# Patient Record
Sex: FEMALE | Race: WHITE | NOT HISPANIC OR LATINO | URBAN - METROPOLITAN AREA
[De-identification: names, ages, dates, MRNs, and addresses within clinical notes are randomized per-mention and may not be internally consistent; named-entity substitution may affect disease eponyms.]

---

## 2017-05-25 ENCOUNTER — INPATIENT (INPATIENT)
Facility: HOSPITAL | Age: 67
LOS: 7 days | Discharge: HOME HEALTH | End: 2017-06-02
Attending: INTERNAL MEDICINE

## 2017-05-25 DIAGNOSIS — G30.9 ALZHEIMER'S DISEASE, UNSPECIFIED: ICD-10-CM

## 2017-05-25 DIAGNOSIS — J18.9 PNEUMONIA, UNSPECIFIED ORGANISM: ICD-10-CM

## 2017-06-28 DIAGNOSIS — J69.0 PNEUMONITIS DUE TO INHALATION OF FOOD AND VOMIT: ICD-10-CM

## 2017-06-28 DIAGNOSIS — R13.10 DYSPHAGIA, UNSPECIFIED: ICD-10-CM

## 2017-06-28 DIAGNOSIS — R06.02 SHORTNESS OF BREATH: ICD-10-CM

## 2017-06-28 DIAGNOSIS — Z66 DO NOT RESUSCITATE: ICD-10-CM

## 2017-06-28 DIAGNOSIS — E87.2 ACIDOSIS: ICD-10-CM

## 2017-06-28 DIAGNOSIS — Z74.01 BED CONFINEMENT STATUS: ICD-10-CM

## 2017-06-28 DIAGNOSIS — D64.9 ANEMIA, UNSPECIFIED: ICD-10-CM

## 2017-06-28 DIAGNOSIS — R53.2 FUNCTIONAL QUADRIPLEGIA: ICD-10-CM

## 2017-06-28 DIAGNOSIS — A41.9 SEPSIS, UNSPECIFIED ORGANISM: ICD-10-CM

## 2017-06-28 DIAGNOSIS — G30.9 ALZHEIMER'S DISEASE, UNSPECIFIED: ICD-10-CM

## 2017-06-28 DIAGNOSIS — J90 PLEURAL EFFUSION, NOT ELSEWHERE CLASSIFIED: ICD-10-CM

## 2017-06-28 DIAGNOSIS — J96.91 RESPIRATORY FAILURE, UNSPECIFIED WITH HYPOXIA: ICD-10-CM

## 2017-06-28 DIAGNOSIS — F02.80 DEMENTIA IN OTHER DISEASES CLASSIFIED ELSEWHERE, UNSPECIFIED SEVERITY, WITHOUT BEHAVIORAL DISTURBANCE, PSYCHOTIC DISTURBANCE, MOOD DISTURBANCE, AND ANXIETY: ICD-10-CM

## 2017-06-28 DIAGNOSIS — R00.0 TACHYCARDIA, UNSPECIFIED: ICD-10-CM

## 2017-06-28 PROBLEM — Z00.00 ENCOUNTER FOR PREVENTIVE HEALTH EXAMINATION: Status: ACTIVE | Noted: 2017-06-28

## 2024-01-01 ENCOUNTER — INPATIENT (INPATIENT)
Facility: HOSPITAL | Age: 74
LOS: 21 days | DRG: 872 | End: 2024-09-05
Attending: STUDENT IN AN ORGANIZED HEALTH CARE EDUCATION/TRAINING PROGRAM | Admitting: HOSPITALIST
Payer: MEDICARE

## 2024-01-01 VITALS — WEIGHT: 80.03 LBS | HEIGHT: 58 IN | HEART RATE: 132 BPM | OXYGEN SATURATION: 80 % | RESPIRATION RATE: 28 BRPM

## 2024-01-01 VITALS
DIASTOLIC BLOOD PRESSURE: 100 MMHG | OXYGEN SATURATION: 95 % | TEMPERATURE: 98 F | SYSTOLIC BLOOD PRESSURE: 139 MMHG | HEART RATE: 100 BPM | RESPIRATION RATE: 16 BRPM

## 2024-01-01 DIAGNOSIS — A41.9 SEPSIS, UNSPECIFIED ORGANISM: ICD-10-CM

## 2024-01-01 DIAGNOSIS — R13.10 DYSPHAGIA, UNSPECIFIED: ICD-10-CM

## 2024-01-01 DIAGNOSIS — J96.01 ACUTE RESPIRATORY FAILURE WITH HYPOXIA: ICD-10-CM

## 2024-01-01 DIAGNOSIS — Z71.89 OTHER SPECIFIED COUNSELING: ICD-10-CM

## 2024-01-01 DIAGNOSIS — Z51.5 ENCOUNTER FOR PALLIATIVE CARE: ICD-10-CM

## 2024-01-01 LAB
-  CLINDAMYCIN: SIGNIFICANT CHANGE UP
-  ERYTHROMYCIN: SIGNIFICANT CHANGE UP
-  GENTAMICIN: SIGNIFICANT CHANGE UP
-  OXACILLIN: SIGNIFICANT CHANGE UP
-  PENICILLIN: SIGNIFICANT CHANGE UP
-  RIFAMPIN: SIGNIFICANT CHANGE UP
-  STAPHYLOCOCCUS EPIDERMIDIS, METHICILLIN RESISTANT: SIGNIFICANT CHANGE UP
-  TETRACYCLINE: SIGNIFICANT CHANGE UP
-  TRIMETHOPRIM/SULFAMETHOXAZOLE: SIGNIFICANT CHANGE UP
-  VANCOMYCIN: SIGNIFICANT CHANGE UP
ALBUMIN SERPL ELPH-MCNC: 2.1 G/DL — LOW (ref 3.5–5.2)
ALBUMIN SERPL ELPH-MCNC: 2.2 G/DL — LOW (ref 3.5–5.2)
ALBUMIN SERPL ELPH-MCNC: 2.2 G/DL — LOW (ref 3.5–5.2)
ALBUMIN SERPL ELPH-MCNC: 2.3 G/DL — LOW (ref 3.5–5.2)
ALBUMIN SERPL ELPH-MCNC: 2.4 G/DL — LOW (ref 3.5–5.2)
ALBUMIN SERPL ELPH-MCNC: 2.6 G/DL — LOW (ref 3.5–5.2)
ALBUMIN SERPL ELPH-MCNC: 2.7 G/DL — LOW (ref 3.5–5.2)
ALBUMIN SERPL ELPH-MCNC: 2.8 G/DL — LOW (ref 3.5–5.2)
ALBUMIN SERPL ELPH-MCNC: 2.8 G/DL — LOW (ref 3.5–5.2)
ALBUMIN SERPL ELPH-MCNC: 2.9 G/DL — LOW (ref 3.5–5.2)
ALBUMIN SERPL ELPH-MCNC: 3 G/DL — LOW (ref 3.5–5.2)
ALBUMIN SERPL ELPH-MCNC: 3.1 G/DL — LOW (ref 3.5–5.2)
ALBUMIN SERPL ELPH-MCNC: 3.7 G/DL — SIGNIFICANT CHANGE UP (ref 3.5–5.2)
ALP SERPL-CCNC: 100 U/L — SIGNIFICANT CHANGE UP (ref 30–115)
ALP SERPL-CCNC: 100 U/L — SIGNIFICANT CHANGE UP (ref 30–115)
ALP SERPL-CCNC: 102 U/L — SIGNIFICANT CHANGE UP (ref 30–115)
ALP SERPL-CCNC: 105 U/L — SIGNIFICANT CHANGE UP (ref 30–115)
ALP SERPL-CCNC: 106 U/L — SIGNIFICANT CHANGE UP (ref 30–115)
ALP SERPL-CCNC: 107 U/L — SIGNIFICANT CHANGE UP (ref 30–115)
ALP SERPL-CCNC: 107 U/L — SIGNIFICANT CHANGE UP (ref 30–115)
ALP SERPL-CCNC: 108 U/L — SIGNIFICANT CHANGE UP (ref 30–115)
ALP SERPL-CCNC: 109 U/L — SIGNIFICANT CHANGE UP (ref 30–115)
ALP SERPL-CCNC: 110 U/L — SIGNIFICANT CHANGE UP (ref 30–115)
ALP SERPL-CCNC: 114 U/L — SIGNIFICANT CHANGE UP (ref 30–115)
ALP SERPL-CCNC: 121 U/L — HIGH (ref 30–115)
ALP SERPL-CCNC: 142 U/L — HIGH (ref 30–115)
ALP SERPL-CCNC: 84 U/L — SIGNIFICANT CHANGE UP (ref 30–115)
ALP SERPL-CCNC: 86 U/L — SIGNIFICANT CHANGE UP (ref 30–115)
ALP SERPL-CCNC: 91 U/L — SIGNIFICANT CHANGE UP (ref 30–115)
ALP SERPL-CCNC: 92 U/L — SIGNIFICANT CHANGE UP (ref 30–115)
ALP SERPL-CCNC: 93 U/L — SIGNIFICANT CHANGE UP (ref 30–115)
ALP SERPL-CCNC: 95 U/L — SIGNIFICANT CHANGE UP (ref 30–115)
ALP SERPL-CCNC: 97 U/L — SIGNIFICANT CHANGE UP (ref 30–115)
ALP SERPL-CCNC: 99 U/L — SIGNIFICANT CHANGE UP (ref 30–115)
ALT FLD-CCNC: 21 U/L — SIGNIFICANT CHANGE UP (ref 0–41)
ALT FLD-CCNC: 23 U/L — SIGNIFICANT CHANGE UP (ref 0–41)
ALT FLD-CCNC: 26 U/L — SIGNIFICANT CHANGE UP (ref 0–41)
ALT FLD-CCNC: 26 U/L — SIGNIFICANT CHANGE UP (ref 0–41)
ALT FLD-CCNC: 27 U/L — SIGNIFICANT CHANGE UP (ref 0–41)
ALT FLD-CCNC: 29 U/L — SIGNIFICANT CHANGE UP (ref 0–41)
ALT FLD-CCNC: 30 U/L — SIGNIFICANT CHANGE UP (ref 0–41)
ALT FLD-CCNC: 31 U/L — SIGNIFICANT CHANGE UP (ref 0–41)
ALT FLD-CCNC: 31 U/L — SIGNIFICANT CHANGE UP (ref 0–41)
ALT FLD-CCNC: 32 U/L — SIGNIFICANT CHANGE UP (ref 0–41)
ALT FLD-CCNC: 32 U/L — SIGNIFICANT CHANGE UP (ref 0–41)
ALT FLD-CCNC: 33 U/L — SIGNIFICANT CHANGE UP (ref 0–41)
ALT FLD-CCNC: 35 U/L — SIGNIFICANT CHANGE UP (ref 0–41)
ALT FLD-CCNC: 39 U/L — SIGNIFICANT CHANGE UP (ref 0–41)
ALT FLD-CCNC: 40 U/L — SIGNIFICANT CHANGE UP (ref 0–41)
ALT FLD-CCNC: 42 U/L — HIGH (ref 0–41)
ALT FLD-CCNC: 43 U/L — HIGH (ref 0–41)
ALT FLD-CCNC: 54 U/L — HIGH (ref 0–41)
ALT FLD-CCNC: 70 U/L — HIGH (ref 0–41)
ANION GAP SERPL CALC-SCNC: 10 MMOL/L — SIGNIFICANT CHANGE UP (ref 7–14)
ANION GAP SERPL CALC-SCNC: 11 MMOL/L — SIGNIFICANT CHANGE UP (ref 7–14)
ANION GAP SERPL CALC-SCNC: 12 MMOL/L — SIGNIFICANT CHANGE UP (ref 7–14)
ANION GAP SERPL CALC-SCNC: 13 MMOL/L — SIGNIFICANT CHANGE UP (ref 7–14)
ANION GAP SERPL CALC-SCNC: 14 MMOL/L — SIGNIFICANT CHANGE UP (ref 7–14)
ANION GAP SERPL CALC-SCNC: 15 MMOL/L — HIGH (ref 7–14)
ANION GAP SERPL CALC-SCNC: 16 MMOL/L — HIGH (ref 7–14)
ANION GAP SERPL CALC-SCNC: 16 MMOL/L — HIGH (ref 7–14)
ANION GAP SERPL CALC-SCNC: 17 MMOL/L — HIGH (ref 7–14)
ANION GAP SERPL CALC-SCNC: 18 MMOL/L — HIGH (ref 7–14)
ANION GAP SERPL CALC-SCNC: 18 MMOL/L — HIGH (ref 7–14)
ANION GAP SERPL CALC-SCNC: 24 MMOL/L — HIGH (ref 7–14)
ANION GAP SERPL CALC-SCNC: 8 MMOL/L — SIGNIFICANT CHANGE UP (ref 7–14)
ANION GAP SERPL CALC-SCNC: 9 MMOL/L — SIGNIFICANT CHANGE UP (ref 7–14)
APPEARANCE UR: CLEAR — SIGNIFICANT CHANGE UP
APTT BLD: 35.9 SEC — SIGNIFICANT CHANGE UP (ref 27–39.2)
AST SERPL-CCNC: 19 U/L — SIGNIFICANT CHANGE UP (ref 0–41)
AST SERPL-CCNC: 20 U/L — SIGNIFICANT CHANGE UP (ref 0–41)
AST SERPL-CCNC: 20 U/L — SIGNIFICANT CHANGE UP (ref 0–41)
AST SERPL-CCNC: 21 U/L — SIGNIFICANT CHANGE UP (ref 0–41)
AST SERPL-CCNC: 21 U/L — SIGNIFICANT CHANGE UP (ref 0–41)
AST SERPL-CCNC: 22 U/L — SIGNIFICANT CHANGE UP (ref 0–41)
AST SERPL-CCNC: 23 U/L — SIGNIFICANT CHANGE UP (ref 0–41)
AST SERPL-CCNC: 24 U/L — SIGNIFICANT CHANGE UP (ref 0–41)
AST SERPL-CCNC: 25 U/L — SIGNIFICANT CHANGE UP (ref 0–41)
AST SERPL-CCNC: 26 U/L — SIGNIFICANT CHANGE UP (ref 0–41)
AST SERPL-CCNC: 27 U/L — SIGNIFICANT CHANGE UP (ref 0–41)
AST SERPL-CCNC: 28 U/L — SIGNIFICANT CHANGE UP (ref 0–41)
AST SERPL-CCNC: 29 U/L — SIGNIFICANT CHANGE UP (ref 0–41)
AST SERPL-CCNC: 31 U/L — SIGNIFICANT CHANGE UP (ref 0–41)
AST SERPL-CCNC: 32 U/L — SIGNIFICANT CHANGE UP (ref 0–41)
AST SERPL-CCNC: 37 U/L — SIGNIFICANT CHANGE UP (ref 0–41)
AST SERPL-CCNC: 38 U/L — SIGNIFICANT CHANGE UP (ref 0–41)
AST SERPL-CCNC: 51 U/L — HIGH (ref 0–41)
AST SERPL-CCNC: 59 U/L — HIGH (ref 0–41)
BACTERIA # UR AUTO: ABNORMAL /HPF
BASE EXCESS BLDA CALC-SCNC: -5.6 MMOL/L — LOW (ref -2–3)
BASE EXCESS BLDV CALC-SCNC: -3.9 MMOL/L — LOW (ref -2–3)
BASOPHILS # BLD AUTO: 0 K/UL — SIGNIFICANT CHANGE UP (ref 0–0.2)
BASOPHILS # BLD AUTO: 0 K/UL — SIGNIFICANT CHANGE UP (ref 0–0.2)
BASOPHILS # BLD AUTO: 0.01 K/UL — SIGNIFICANT CHANGE UP (ref 0–0.2)
BASOPHILS # BLD AUTO: 0.02 K/UL — SIGNIFICANT CHANGE UP (ref 0–0.2)
BASOPHILS # BLD AUTO: 0.03 K/UL — SIGNIFICANT CHANGE UP (ref 0–0.2)
BASOPHILS # BLD AUTO: 0.04 K/UL — SIGNIFICANT CHANGE UP (ref 0–0.2)
BASOPHILS # BLD AUTO: 0.05 K/UL — SIGNIFICANT CHANGE UP (ref 0–0.2)
BASOPHILS # BLD AUTO: 0.05 K/UL — SIGNIFICANT CHANGE UP (ref 0–0.2)
BASOPHILS NFR BLD AUTO: 0 % — SIGNIFICANT CHANGE UP (ref 0–1)
BASOPHILS NFR BLD AUTO: 0 % — SIGNIFICANT CHANGE UP (ref 0–1)
BASOPHILS NFR BLD AUTO: 0.1 % — SIGNIFICANT CHANGE UP (ref 0–1)
BASOPHILS NFR BLD AUTO: 0.2 % — SIGNIFICANT CHANGE UP (ref 0–1)
BASOPHILS NFR BLD AUTO: 0.3 % — SIGNIFICANT CHANGE UP (ref 0–1)
BASOPHILS NFR BLD AUTO: 0.4 % — SIGNIFICANT CHANGE UP (ref 0–1)
BILIRUB SERPL-MCNC: 0.4 MG/DL — SIGNIFICANT CHANGE UP (ref 0.2–1.2)
BILIRUB SERPL-MCNC: 0.5 MG/DL — SIGNIFICANT CHANGE UP (ref 0.2–1.2)
BILIRUB SERPL-MCNC: 0.6 MG/DL — SIGNIFICANT CHANGE UP (ref 0.2–1.2)
BILIRUB SERPL-MCNC: 0.7 MG/DL — SIGNIFICANT CHANGE UP (ref 0.2–1.2)
BILIRUB SERPL-MCNC: 0.7 MG/DL — SIGNIFICANT CHANGE UP (ref 0.2–1.2)
BILIRUB SERPL-MCNC: 1 MG/DL — SIGNIFICANT CHANGE UP (ref 0.2–1.2)
BILIRUB SERPL-MCNC: 1 MG/DL — SIGNIFICANT CHANGE UP (ref 0.2–1.2)
BILIRUB UR-MCNC: ABNORMAL
BLD GP AB SCN SERPL QL: SIGNIFICANT CHANGE UP
BUN SERPL-MCNC: 112 MG/DL — CRITICAL HIGH (ref 10–20)
BUN SERPL-MCNC: 116 MG/DL — CRITICAL HIGH (ref 10–20)
BUN SERPL-MCNC: 150 MG/DL — CRITICAL HIGH (ref 10–20)
BUN SERPL-MCNC: 17 MG/DL — SIGNIFICANT CHANGE UP (ref 10–20)
BUN SERPL-MCNC: 17 MG/DL — SIGNIFICANT CHANGE UP (ref 10–20)
BUN SERPL-MCNC: 18 MG/DL — SIGNIFICANT CHANGE UP (ref 10–20)
BUN SERPL-MCNC: 18 MG/DL — SIGNIFICANT CHANGE UP (ref 10–20)
BUN SERPL-MCNC: 19 MG/DL — SIGNIFICANT CHANGE UP (ref 10–20)
BUN SERPL-MCNC: 20 MG/DL — SIGNIFICANT CHANGE UP (ref 10–20)
BUN SERPL-MCNC: 22 MG/DL — HIGH (ref 10–20)
BUN SERPL-MCNC: 23 MG/DL — HIGH (ref 10–20)
BUN SERPL-MCNC: 24 MG/DL — HIGH (ref 10–20)
BUN SERPL-MCNC: 25 MG/DL — HIGH (ref 10–20)
BUN SERPL-MCNC: 36 MG/DL — HIGH (ref 10–20)
BUN SERPL-MCNC: 53 MG/DL — HIGH (ref 10–20)
BUN SERPL-MCNC: 73 MG/DL — CRITICAL HIGH (ref 10–20)
BUN SERPL-MCNC: 80 MG/DL — CRITICAL HIGH (ref 10–20)
BUN SERPL-MCNC: 81 MG/DL — CRITICAL HIGH (ref 10–20)
BUN SERPL-MCNC: 86 MG/DL — CRITICAL HIGH (ref 10–20)
BUN SERPL-MCNC: 92 MG/DL — CRITICAL HIGH (ref 10–20)
BURR CELLS BLD QL SMEAR: PRESENT — SIGNIFICANT CHANGE UP
CA-I SERPL-SCNC: 0.96 MMOL/L — LOW (ref 1.15–1.33)
CALCIUM SERPL-MCNC: 7.3 MG/DL — LOW (ref 8.4–10.5)
CALCIUM SERPL-MCNC: 7.4 MG/DL — LOW (ref 8.4–10.5)
CALCIUM SERPL-MCNC: 7.4 MG/DL — LOW (ref 8.4–10.5)
CALCIUM SERPL-MCNC: 7.5 MG/DL — LOW (ref 8.4–10.5)
CALCIUM SERPL-MCNC: 7.5 MG/DL — LOW (ref 8.4–10.5)
CALCIUM SERPL-MCNC: 7.6 MG/DL — LOW (ref 8.4–10.5)
CALCIUM SERPL-MCNC: 7.7 MG/DL — LOW (ref 8.4–10.5)
CALCIUM SERPL-MCNC: 7.8 MG/DL — LOW (ref 8.4–10.5)
CALCIUM SERPL-MCNC: 8 MG/DL — LOW (ref 8.4–10.5)
CALCIUM SERPL-MCNC: 8.1 MG/DL — LOW (ref 8.4–10.5)
CALCIUM SERPL-MCNC: 8.3 MG/DL — LOW (ref 8.4–10.5)
CALCIUM SERPL-MCNC: 8.4 MG/DL — SIGNIFICANT CHANGE UP (ref 8.4–10.5)
CALCIUM SERPL-MCNC: 8.4 MG/DL — SIGNIFICANT CHANGE UP (ref 8.4–10.5)
CALCIUM SERPL-MCNC: 8.5 MG/DL — SIGNIFICANT CHANGE UP (ref 8.4–10.5)
CALCIUM SERPL-MCNC: 9.5 MG/DL — SIGNIFICANT CHANGE UP (ref 8.4–10.5)
CHLORIDE SERPL-SCNC: 102 MMOL/L — SIGNIFICANT CHANGE UP (ref 98–110)
CHLORIDE SERPL-SCNC: 103 MMOL/L — SIGNIFICANT CHANGE UP (ref 98–110)
CHLORIDE SERPL-SCNC: 107 MMOL/L — SIGNIFICANT CHANGE UP (ref 98–110)
CHLORIDE SERPL-SCNC: 109 MMOL/L — SIGNIFICANT CHANGE UP (ref 98–110)
CHLORIDE SERPL-SCNC: 110 MMOL/L — SIGNIFICANT CHANGE UP (ref 98–110)
CHLORIDE SERPL-SCNC: 111 MMOL/L — HIGH (ref 98–110)
CHLORIDE SERPL-SCNC: 112 MMOL/L — HIGH (ref 98–110)
CHLORIDE SERPL-SCNC: 112 MMOL/L — HIGH (ref 98–110)
CHLORIDE SERPL-SCNC: 114 MMOL/L — HIGH (ref 98–110)
CHLORIDE SERPL-SCNC: 118 MMOL/L — HIGH (ref 98–110)
CHLORIDE SERPL-SCNC: 119 MMOL/L — HIGH (ref 98–110)
CHLORIDE SERPL-SCNC: 120 MMOL/L — HIGH (ref 98–110)
CHLORIDE SERPL-SCNC: 121 MMOL/L — HIGH (ref 98–110)
CHLORIDE SERPL-SCNC: 122 MMOL/L — HIGH (ref 98–110)
CHLORIDE SERPL-SCNC: 123 MMOL/L — HIGH (ref 98–110)
CHLORIDE SERPL-SCNC: 94 MMOL/L — LOW (ref 98–110)
CHLORIDE SERPL-SCNC: 97 MMOL/L — LOW (ref 98–110)
CHLORIDE SERPL-SCNC: 98 MMOL/L — SIGNIFICANT CHANGE UP (ref 98–110)
CHLORIDE SERPL-SCNC: 98 MMOL/L — SIGNIFICANT CHANGE UP (ref 98–110)
CHLORIDE SERPL-SCNC: 99 MMOL/L — SIGNIFICANT CHANGE UP (ref 98–110)
CK SERPL-CCNC: 198 U/L — SIGNIFICANT CHANGE UP (ref 0–225)
CO2 SERPL-SCNC: 13 MMOL/L — LOW (ref 17–32)
CO2 SERPL-SCNC: 14 MMOL/L — LOW (ref 17–32)
CO2 SERPL-SCNC: 17 MMOL/L — SIGNIFICANT CHANGE UP (ref 17–32)
CO2 SERPL-SCNC: 18 MMOL/L — SIGNIFICANT CHANGE UP (ref 17–32)
CO2 SERPL-SCNC: 19 MMOL/L — SIGNIFICANT CHANGE UP (ref 17–32)
CO2 SERPL-SCNC: 20 MMOL/L — SIGNIFICANT CHANGE UP (ref 17–32)
CO2 SERPL-SCNC: 22 MMOL/L — SIGNIFICANT CHANGE UP (ref 17–32)
CO2 SERPL-SCNC: 23 MMOL/L — SIGNIFICANT CHANGE UP (ref 17–32)
CO2 SERPL-SCNC: 23 MMOL/L — SIGNIFICANT CHANGE UP (ref 17–32)
CO2 SERPL-SCNC: 24 MMOL/L — SIGNIFICANT CHANGE UP (ref 17–32)
CO2 SERPL-SCNC: 24 MMOL/L — SIGNIFICANT CHANGE UP (ref 17–32)
CO2 SERPL-SCNC: 25 MMOL/L — SIGNIFICANT CHANGE UP (ref 17–32)
CO2 SERPL-SCNC: 25 MMOL/L — SIGNIFICANT CHANGE UP (ref 17–32)
CO2 SERPL-SCNC: 26 MMOL/L — SIGNIFICANT CHANGE UP (ref 17–32)
CO2 SERPL-SCNC: 27 MMOL/L — SIGNIFICANT CHANGE UP (ref 17–32)
CO2 SERPL-SCNC: 29 MMOL/L — SIGNIFICANT CHANGE UP (ref 17–32)
COLOR SPEC: SIGNIFICANT CHANGE UP
CREAT SERPL-MCNC: 0.5 MG/DL — LOW (ref 0.7–1.5)
CREAT SERPL-MCNC: 0.6 MG/DL — LOW (ref 0.7–1.5)
CREAT SERPL-MCNC: 0.8 MG/DL — SIGNIFICANT CHANGE UP (ref 0.7–1.5)
CREAT SERPL-MCNC: 1 MG/DL — SIGNIFICANT CHANGE UP (ref 0.7–1.5)
CREAT SERPL-MCNC: 1.3 MG/DL — SIGNIFICANT CHANGE UP (ref 0.7–1.5)
CREAT SERPL-MCNC: 1.5 MG/DL — SIGNIFICANT CHANGE UP (ref 0.7–1.5)
CREAT SERPL-MCNC: 1.6 MG/DL — HIGH (ref 0.7–1.5)
CREAT SERPL-MCNC: 1.6 MG/DL — HIGH (ref 0.7–1.5)
CREAT SERPL-MCNC: 1.7 MG/DL — HIGH (ref 0.7–1.5)
CREAT SERPL-MCNC: 2.2 MG/DL — HIGH (ref 0.7–1.5)
CREAT SERPL-MCNC: 2.5 MG/DL — HIGH (ref 0.7–1.5)
CREAT SERPL-MCNC: 3.4 MG/DL — HIGH (ref 0.7–1.5)
CREAT SERPL-MCNC: <0.5 MG/DL — LOW (ref 0.7–1.5)
CULTURE RESULTS: ABNORMAL
CULTURE RESULTS: ABNORMAL
DACRYOCYTES BLD QL SMEAR: SLIGHT — SIGNIFICANT CHANGE UP
DIFF PNL FLD: NEGATIVE — SIGNIFICANT CHANGE UP
EGFR: 103 ML/MIN/1.73M2 — SIGNIFICANT CHANGE UP
EGFR: 14 ML/MIN/1.73M2 — LOW
EGFR: 20 ML/MIN/1.73M2 — LOW
EGFR: 23 ML/MIN/1.73M2 — LOW
EGFR: 31 ML/MIN/1.73M2 — LOW
EGFR: 33 ML/MIN/1.73M2 — LOW
EGFR: 33 ML/MIN/1.73M2 — LOW
EGFR: 36 ML/MIN/1.73M2 — LOW
EGFR: 43 ML/MIN/1.73M2 — LOW
EGFR: 59 ML/MIN/1.73M2 — LOW
EGFR: 77 ML/MIN/1.73M2 — SIGNIFICANT CHANGE UP
EGFR: 94 ML/MIN/1.73M2 — SIGNIFICANT CHANGE UP
EGFR: 98 ML/MIN/1.73M2 — SIGNIFICANT CHANGE UP
EOSINOPHIL # BLD AUTO: 0 K/UL — SIGNIFICANT CHANGE UP (ref 0–0.7)
EOSINOPHIL # BLD AUTO: 0.02 K/UL — SIGNIFICANT CHANGE UP (ref 0–0.7)
EOSINOPHIL # BLD AUTO: 0.04 K/UL — SIGNIFICANT CHANGE UP (ref 0–0.7)
EOSINOPHIL # BLD AUTO: 0.04 K/UL — SIGNIFICANT CHANGE UP (ref 0–0.7)
EOSINOPHIL # BLD AUTO: 0.06 K/UL — SIGNIFICANT CHANGE UP (ref 0–0.7)
EOSINOPHIL # BLD AUTO: 0.07 K/UL — SIGNIFICANT CHANGE UP (ref 0–0.7)
EOSINOPHIL # BLD AUTO: 0.07 K/UL — SIGNIFICANT CHANGE UP (ref 0–0.7)
EOSINOPHIL # BLD AUTO: 0.09 K/UL — SIGNIFICANT CHANGE UP (ref 0–0.7)
EOSINOPHIL # BLD AUTO: 0.12 K/UL — SIGNIFICANT CHANGE UP (ref 0–0.7)
EOSINOPHIL NFR BLD AUTO: 0 % — SIGNIFICANT CHANGE UP (ref 0–8)
EOSINOPHIL NFR BLD AUTO: 0.1 % — SIGNIFICANT CHANGE UP (ref 0–8)
EOSINOPHIL NFR BLD AUTO: 0.5 % — SIGNIFICANT CHANGE UP (ref 0–8)
EOSINOPHIL NFR BLD AUTO: 0.6 % — SIGNIFICANT CHANGE UP (ref 0–8)
EOSINOPHIL NFR BLD AUTO: 0.6 % — SIGNIFICANT CHANGE UP (ref 0–8)
EOSINOPHIL NFR BLD AUTO: 0.8 % — SIGNIFICANT CHANGE UP (ref 0–8)
EOSINOPHIL NFR BLD AUTO: 0.8 % — SIGNIFICANT CHANGE UP (ref 0–8)
EOSINOPHIL NFR BLD AUTO: 1 % — SIGNIFICANT CHANGE UP (ref 0–8)
EOSINOPHIL NFR BLD AUTO: 1 % — SIGNIFICANT CHANGE UP (ref 0–8)
FLUAV AG NPH QL: SIGNIFICANT CHANGE UP
FLUBV AG NPH QL: SIGNIFICANT CHANGE UP
GAS PNL BLDA: SIGNIFICANT CHANGE UP
GAS PNL BLDA: SIGNIFICANT CHANGE UP
GAS PNL BLDV: 150 MMOL/L — HIGH (ref 136–145)
GAS PNL BLDV: SIGNIFICANT CHANGE UP
GLUCOSE BLDC GLUCOMTR-MCNC: 108 MG/DL — HIGH (ref 70–99)
GLUCOSE BLDC GLUCOMTR-MCNC: 182 MG/DL — HIGH (ref 70–99)
GLUCOSE SERPL-MCNC: 112 MG/DL — HIGH (ref 70–99)
GLUCOSE SERPL-MCNC: 115 MG/DL — HIGH (ref 70–99)
GLUCOSE SERPL-MCNC: 117 MG/DL — HIGH (ref 70–99)
GLUCOSE SERPL-MCNC: 118 MG/DL — HIGH (ref 70–99)
GLUCOSE SERPL-MCNC: 122 MG/DL — HIGH (ref 70–99)
GLUCOSE SERPL-MCNC: 124 MG/DL — HIGH (ref 70–99)
GLUCOSE SERPL-MCNC: 131 MG/DL — HIGH (ref 70–99)
GLUCOSE SERPL-MCNC: 132 MG/DL — HIGH (ref 70–99)
GLUCOSE SERPL-MCNC: 137 MG/DL — HIGH (ref 70–99)
GLUCOSE SERPL-MCNC: 137 MG/DL — HIGH (ref 70–99)
GLUCOSE SERPL-MCNC: 145 MG/DL — HIGH (ref 70–99)
GLUCOSE SERPL-MCNC: 156 MG/DL — HIGH (ref 70–99)
GLUCOSE SERPL-MCNC: 180 MG/DL — HIGH (ref 70–99)
GLUCOSE SERPL-MCNC: 182 MG/DL — HIGH (ref 70–99)
GLUCOSE SERPL-MCNC: 187 MG/DL — HIGH (ref 70–99)
GLUCOSE SERPL-MCNC: 195 MG/DL — HIGH (ref 70–99)
GLUCOSE SERPL-MCNC: 208 MG/DL — HIGH (ref 70–99)
GLUCOSE SERPL-MCNC: 219 MG/DL — HIGH (ref 70–99)
GLUCOSE SERPL-MCNC: 223 MG/DL — HIGH (ref 70–99)
GLUCOSE SERPL-MCNC: 93 MG/DL — SIGNIFICANT CHANGE UP (ref 70–99)
GLUCOSE SERPL-MCNC: 94 MG/DL — SIGNIFICANT CHANGE UP (ref 70–99)
GLUCOSE SERPL-MCNC: 94 MG/DL — SIGNIFICANT CHANGE UP (ref 70–99)
GLUCOSE SERPL-MCNC: 96 MG/DL — SIGNIFICANT CHANGE UP (ref 70–99)
GLUCOSE SERPL-MCNC: 99 MG/DL — SIGNIFICANT CHANGE UP (ref 70–99)
GLUCOSE UR QL: NEGATIVE MG/DL — SIGNIFICANT CHANGE UP
GRAM STN FLD: ABNORMAL
GRAM STN FLD: ABNORMAL
HCO3 BLDA-SCNC: 18 MMOL/L — LOW (ref 21–28)
HCO3 BLDV-SCNC: 22 MMOL/L — SIGNIFICANT CHANGE UP (ref 22–29)
HCT VFR BLD CALC: 27 % — LOW (ref 37–47)
HCT VFR BLD CALC: 27.5 % — LOW (ref 37–47)
HCT VFR BLD CALC: 28.3 % — LOW (ref 37–47)
HCT VFR BLD CALC: 29.6 % — LOW (ref 37–47)
HCT VFR BLD CALC: 29.6 % — LOW (ref 37–47)
HCT VFR BLD CALC: 30.9 % — LOW (ref 37–47)
HCT VFR BLD CALC: 31.3 % — LOW (ref 37–47)
HCT VFR BLD CALC: 31.6 % — LOW (ref 37–47)
HCT VFR BLD CALC: 31.7 % — LOW (ref 37–47)
HCT VFR BLD CALC: 32 % — LOW (ref 37–47)
HCT VFR BLD CALC: 34.6 % — LOW (ref 37–47)
HCT VFR BLD CALC: 35.2 % — LOW (ref 37–47)
HCT VFR BLD CALC: 35.6 % — LOW (ref 37–47)
HCT VFR BLD CALC: 35.7 % — LOW (ref 37–47)
HCT VFR BLD CALC: 36.8 % — LOW (ref 37–47)
HCT VFR BLD CALC: 37.2 % — SIGNIFICANT CHANGE UP (ref 37–47)
HCT VFR BLD CALC: 37.8 % — SIGNIFICANT CHANGE UP (ref 37–47)
HCT VFR BLD CALC: 38.2 % — SIGNIFICANT CHANGE UP (ref 37–47)
HCT VFR BLD CALC: 38.3 % — SIGNIFICANT CHANGE UP (ref 37–47)
HCT VFR BLD CALC: 39.2 % — SIGNIFICANT CHANGE UP (ref 37–47)
HCT VFR BLD CALC: 53.4 % — HIGH (ref 37–47)
HCT VFR BLDA CALC: 42 % — SIGNIFICANT CHANGE UP (ref 34.5–46.5)
HGB BLD CALC-MCNC: 14 G/DL — SIGNIFICANT CHANGE UP (ref 11.7–16.1)
HGB BLD-MCNC: 10.2 G/DL — LOW (ref 12–16)
HGB BLD-MCNC: 10.3 G/DL — LOW (ref 12–16)
HGB BLD-MCNC: 10.3 G/DL — LOW (ref 12–16)
HGB BLD-MCNC: 11 G/DL — LOW (ref 12–16)
HGB BLD-MCNC: 11.4 G/DL — LOW (ref 12–16)
HGB BLD-MCNC: 11.5 G/DL — LOW (ref 12–16)
HGB BLD-MCNC: 11.7 G/DL — LOW (ref 12–16)
HGB BLD-MCNC: 11.9 G/DL — LOW (ref 12–16)
HGB BLD-MCNC: 12 G/DL — SIGNIFICANT CHANGE UP (ref 12–16)
HGB BLD-MCNC: 12.1 G/DL — SIGNIFICANT CHANGE UP (ref 12–16)
HGB BLD-MCNC: 12.1 G/DL — SIGNIFICANT CHANGE UP (ref 12–16)
HGB BLD-MCNC: 12.2 G/DL — SIGNIFICANT CHANGE UP (ref 12–16)
HGB BLD-MCNC: 12.8 G/DL — SIGNIFICANT CHANGE UP (ref 12–16)
HGB BLD-MCNC: 16.7 G/DL — HIGH (ref 12–16)
HGB BLD-MCNC: 8.6 G/DL — LOW (ref 12–16)
HGB BLD-MCNC: 9 G/DL — LOW (ref 12–16)
HGB BLD-MCNC: 9.3 G/DL — LOW (ref 12–16)
HGB BLD-MCNC: 9.3 G/DL — LOW (ref 12–16)
HGB BLD-MCNC: 9.6 G/DL — LOW (ref 12–16)
HGB BLD-MCNC: 9.8 G/DL — LOW (ref 12–16)
HGB BLD-MCNC: 9.9 G/DL — LOW (ref 12–16)
HOROWITZ INDEX BLDA+IHG-RTO: 100 — SIGNIFICANT CHANGE UP
IMM GRANULOCYTES NFR BLD AUTO: 0.4 % — HIGH (ref 0.1–0.3)
IMM GRANULOCYTES NFR BLD AUTO: 0.6 % — HIGH (ref 0.1–0.3)
IMM GRANULOCYTES NFR BLD AUTO: 0.7 % — HIGH (ref 0.1–0.3)
IMM GRANULOCYTES NFR BLD AUTO: 0.9 % — HIGH (ref 0.1–0.3)
IMM GRANULOCYTES NFR BLD AUTO: 1 % — HIGH (ref 0.1–0.3)
IMM GRANULOCYTES NFR BLD AUTO: 1.2 % — HIGH (ref 0.1–0.3)
IMM GRANULOCYTES NFR BLD AUTO: 1.2 % — HIGH (ref 0.1–0.3)
IMM GRANULOCYTES NFR BLD AUTO: 1.6 % — HIGH (ref 0.1–0.3)
IMM GRANULOCYTES NFR BLD AUTO: 1.7 % — HIGH (ref 0.1–0.3)
IMM GRANULOCYTES NFR BLD AUTO: 1.8 % — HIGH (ref 0.1–0.3)
IMM GRANULOCYTES NFR BLD AUTO: 1.9 % — HIGH (ref 0.1–0.3)
INR BLD: 1.79 RATIO — HIGH (ref 0.65–1.3)
KETONES UR-MCNC: NEGATIVE MG/DL — SIGNIFICANT CHANGE UP
LACTATE BLDV-MCNC: 5.2 MMOL/L — CRITICAL HIGH (ref 0.5–2)
LACTATE SERPL-SCNC: 4.1 MMOL/L — CRITICAL HIGH (ref 0.7–2)
LACTATE SERPL-SCNC: 4.1 MMOL/L — CRITICAL HIGH (ref 0.7–2)
LACTATE SERPL-SCNC: 5.1 MMOL/L — CRITICAL HIGH (ref 0.7–2)
LACTATE SERPL-SCNC: 5.3 MMOL/L — CRITICAL HIGH (ref 0.7–2)
LACTATE SERPL-SCNC: 6.8 MMOL/L — CRITICAL HIGH (ref 0.7–2)
LEUKOCYTE ESTERASE UR-ACNC: ABNORMAL
LYMPHOCYTES # BLD AUTO: 0.57 K/UL — LOW (ref 1.2–3.4)
LYMPHOCYTES # BLD AUTO: 0.57 K/UL — LOW (ref 1.2–3.4)
LYMPHOCYTES # BLD AUTO: 0.68 K/UL — LOW (ref 1.2–3.4)
LYMPHOCYTES # BLD AUTO: 0.68 K/UL — LOW (ref 1.2–3.4)
LYMPHOCYTES # BLD AUTO: 0.77 K/UL — LOW (ref 1.2–3.4)
LYMPHOCYTES # BLD AUTO: 0.78 K/UL — LOW (ref 1.2–3.4)
LYMPHOCYTES # BLD AUTO: 0.79 K/UL — LOW (ref 1.2–3.4)
LYMPHOCYTES # BLD AUTO: 0.85 K/UL — LOW (ref 1.2–3.4)
LYMPHOCYTES # BLD AUTO: 0.9 K/UL — LOW (ref 1.2–3.4)
LYMPHOCYTES # BLD AUTO: 0.91 K/UL — LOW (ref 1.2–3.4)
LYMPHOCYTES # BLD AUTO: 0.95 K/UL — LOW (ref 1.2–3.4)
LYMPHOCYTES # BLD AUTO: 0.95 K/UL — LOW (ref 1.2–3.4)
LYMPHOCYTES # BLD AUTO: 1.17 K/UL — LOW (ref 1.2–3.4)
LYMPHOCYTES # BLD AUTO: 1.2 K/UL — SIGNIFICANT CHANGE UP (ref 1.2–3.4)
LYMPHOCYTES # BLD AUTO: 1.42 K/UL — SIGNIFICANT CHANGE UP (ref 1.2–3.4)
LYMPHOCYTES # BLD AUTO: 1.72 K/UL — SIGNIFICANT CHANGE UP (ref 1.2–3.4)
LYMPHOCYTES # BLD AUTO: 11.2 % — LOW (ref 20.5–51.1)
LYMPHOCYTES # BLD AUTO: 12.3 % — LOW (ref 20.5–51.1)
LYMPHOCYTES # BLD AUTO: 12.7 % — LOW (ref 20.5–51.1)
LYMPHOCYTES # BLD AUTO: 15.6 % — LOW (ref 20.5–51.1)
LYMPHOCYTES # BLD AUTO: 4 % — LOW (ref 20.5–51.1)
LYMPHOCYTES # BLD AUTO: 4.4 % — LOW (ref 20.5–51.1)
LYMPHOCYTES # BLD AUTO: 4.5 % — LOW (ref 20.5–51.1)
LYMPHOCYTES # BLD AUTO: 5 % — LOW (ref 20.5–51.1)
LYMPHOCYTES # BLD AUTO: 5.6 % — LOW (ref 20.5–51.1)
LYMPHOCYTES # BLD AUTO: 6 % — LOW (ref 20.5–51.1)
LYMPHOCYTES # BLD AUTO: 6.5 % — LOW (ref 20.5–51.1)
LYMPHOCYTES # BLD AUTO: 6.8 % — LOW (ref 20.5–51.1)
LYMPHOCYTES # BLD AUTO: 6.9 % — LOW (ref 20.5–51.1)
LYMPHOCYTES # BLD AUTO: 7.5 % — LOW (ref 20.5–51.1)
LYMPHOCYTES # BLD AUTO: 7.9 % — LOW (ref 20.5–51.1)
LYMPHOCYTES # BLD AUTO: 7.9 % — LOW (ref 20.5–51.1)
MAGNESIUM SERPL-MCNC: 1.6 MG/DL — LOW (ref 1.8–2.4)
MAGNESIUM SERPL-MCNC: 1.8 MG/DL — SIGNIFICANT CHANGE UP (ref 1.8–2.4)
MAGNESIUM SERPL-MCNC: 1.9 MG/DL — SIGNIFICANT CHANGE UP (ref 1.8–2.4)
MAGNESIUM SERPL-MCNC: 2 MG/DL — SIGNIFICANT CHANGE UP (ref 1.8–2.4)
MAGNESIUM SERPL-MCNC: 2 MG/DL — SIGNIFICANT CHANGE UP (ref 1.8–2.4)
MAGNESIUM SERPL-MCNC: 2.1 MG/DL — SIGNIFICANT CHANGE UP (ref 1.8–2.4)
MAGNESIUM SERPL-MCNC: 2.3 MG/DL — SIGNIFICANT CHANGE UP (ref 1.8–2.4)
MAGNESIUM SERPL-MCNC: 2.3 MG/DL — SIGNIFICANT CHANGE UP (ref 1.8–2.4)
MAGNESIUM SERPL-MCNC: 2.5 MG/DL — HIGH (ref 1.8–2.4)
MANUAL SMEAR VERIFICATION: SIGNIFICANT CHANGE UP
MCHC RBC-ENTMCNC: 28.7 PG — SIGNIFICANT CHANGE UP (ref 27–31)
MCHC RBC-ENTMCNC: 29.1 PG — SIGNIFICANT CHANGE UP (ref 27–31)
MCHC RBC-ENTMCNC: 29.3 PG — SIGNIFICANT CHANGE UP (ref 27–31)
MCHC RBC-ENTMCNC: 29.4 PG — SIGNIFICANT CHANGE UP (ref 27–31)
MCHC RBC-ENTMCNC: 29.5 PG — SIGNIFICANT CHANGE UP (ref 27–31)
MCHC RBC-ENTMCNC: 29.5 PG — SIGNIFICANT CHANGE UP (ref 27–31)
MCHC RBC-ENTMCNC: 29.6 PG — SIGNIFICANT CHANGE UP (ref 27–31)
MCHC RBC-ENTMCNC: 29.7 PG — SIGNIFICANT CHANGE UP (ref 27–31)
MCHC RBC-ENTMCNC: 29.7 PG — SIGNIFICANT CHANGE UP (ref 27–31)
MCHC RBC-ENTMCNC: 29.8 PG — SIGNIFICANT CHANGE UP (ref 27–31)
MCHC RBC-ENTMCNC: 29.8 PG — SIGNIFICANT CHANGE UP (ref 27–31)
MCHC RBC-ENTMCNC: 29.9 PG — SIGNIFICANT CHANGE UP (ref 27–31)
MCHC RBC-ENTMCNC: 30 PG — SIGNIFICANT CHANGE UP (ref 27–31)
MCHC RBC-ENTMCNC: 30.1 PG — SIGNIFICANT CHANGE UP (ref 27–31)
MCHC RBC-ENTMCNC: 30.3 PG — SIGNIFICANT CHANGE UP (ref 27–31)
MCHC RBC-ENTMCNC: 31 G/DL — LOW (ref 32–37)
MCHC RBC-ENTMCNC: 31.3 G/DL — LOW (ref 32–37)
MCHC RBC-ENTMCNC: 31.4 G/DL — LOW (ref 32–37)
MCHC RBC-ENTMCNC: 31.5 G/DL — LOW (ref 32–37)
MCHC RBC-ENTMCNC: 31.6 G/DL — LOW (ref 32–37)
MCHC RBC-ENTMCNC: 31.6 G/DL — LOW (ref 32–37)
MCHC RBC-ENTMCNC: 31.7 G/DL — LOW (ref 32–37)
MCHC RBC-ENTMCNC: 31.8 G/DL — LOW (ref 32–37)
MCHC RBC-ENTMCNC: 31.9 G/DL — LOW (ref 32–37)
MCHC RBC-ENTMCNC: 32.2 G/DL — SIGNIFICANT CHANGE UP (ref 32–37)
MCHC RBC-ENTMCNC: 32.2 G/DL — SIGNIFICANT CHANGE UP (ref 32–37)
MCHC RBC-ENTMCNC: 32.4 G/DL — SIGNIFICANT CHANGE UP (ref 32–37)
MCHC RBC-ENTMCNC: 32.4 G/DL — SIGNIFICANT CHANGE UP (ref 32–37)
MCHC RBC-ENTMCNC: 32.5 G/DL — SIGNIFICANT CHANGE UP (ref 32–37)
MCHC RBC-ENTMCNC: 32.6 G/DL — SIGNIFICANT CHANGE UP (ref 32–37)
MCHC RBC-ENTMCNC: 32.7 G/DL — SIGNIFICANT CHANGE UP (ref 32–37)
MCHC RBC-ENTMCNC: 32.7 G/DL — SIGNIFICANT CHANGE UP (ref 32–37)
MCHC RBC-ENTMCNC: 32.8 G/DL — SIGNIFICANT CHANGE UP (ref 32–37)
MCHC RBC-ENTMCNC: 32.9 G/DL — SIGNIFICANT CHANGE UP (ref 32–37)
MCHC RBC-ENTMCNC: 32.9 G/DL — SIGNIFICANT CHANGE UP (ref 32–37)
MCHC RBC-ENTMCNC: 33 G/DL — SIGNIFICANT CHANGE UP (ref 32–37)
MCV RBC AUTO: 89 FL — SIGNIFICANT CHANGE UP (ref 81–99)
MCV RBC AUTO: 89 FL — SIGNIFICANT CHANGE UP (ref 81–99)
MCV RBC AUTO: 89.3 FL — SIGNIFICANT CHANGE UP (ref 81–99)
MCV RBC AUTO: 89.4 FL — SIGNIFICANT CHANGE UP (ref 81–99)
MCV RBC AUTO: 89.7 FL — SIGNIFICANT CHANGE UP (ref 81–99)
MCV RBC AUTO: 90.1 FL — SIGNIFICANT CHANGE UP (ref 81–99)
MCV RBC AUTO: 90.1 FL — SIGNIFICANT CHANGE UP (ref 81–99)
MCV RBC AUTO: 90.8 FL — SIGNIFICANT CHANGE UP (ref 81–99)
MCV RBC AUTO: 91.4 FL — SIGNIFICANT CHANGE UP (ref 81–99)
MCV RBC AUTO: 91.6 FL — SIGNIFICANT CHANGE UP (ref 81–99)
MCV RBC AUTO: 91.7 FL — SIGNIFICANT CHANGE UP (ref 81–99)
MCV RBC AUTO: 92 FL — SIGNIFICANT CHANGE UP (ref 81–99)
MCV RBC AUTO: 92.1 FL — SIGNIFICANT CHANGE UP (ref 81–99)
MCV RBC AUTO: 92.2 FL — SIGNIFICANT CHANGE UP (ref 81–99)
MCV RBC AUTO: 93.2 FL — SIGNIFICANT CHANGE UP (ref 81–99)
MCV RBC AUTO: 93.7 FL — SIGNIFICANT CHANGE UP (ref 81–99)
MCV RBC AUTO: 94.1 FL — SIGNIFICANT CHANGE UP (ref 81–99)
MCV RBC AUTO: 94.6 FL — SIGNIFICANT CHANGE UP (ref 81–99)
MCV RBC AUTO: 95 FL — SIGNIFICANT CHANGE UP (ref 81–99)
MCV RBC AUTO: 96 FL — SIGNIFICANT CHANGE UP (ref 81–99)
MCV RBC AUTO: 96.4 FL — SIGNIFICANT CHANGE UP (ref 81–99)
METHOD TYPE: SIGNIFICANT CHANGE UP
METHOD TYPE: SIGNIFICANT CHANGE UP
MONOCYTES # BLD AUTO: 0.16 K/UL — SIGNIFICANT CHANGE UP (ref 0.1–0.6)
MONOCYTES # BLD AUTO: 0.19 K/UL — SIGNIFICANT CHANGE UP (ref 0.1–0.6)
MONOCYTES # BLD AUTO: 0.21 K/UL — SIGNIFICANT CHANGE UP (ref 0.1–0.6)
MONOCYTES # BLD AUTO: 0.33 K/UL — SIGNIFICANT CHANGE UP (ref 0.1–0.6)
MONOCYTES # BLD AUTO: 0.36 K/UL — SIGNIFICANT CHANGE UP (ref 0.1–0.6)
MONOCYTES # BLD AUTO: 0.42 K/UL — SIGNIFICANT CHANGE UP (ref 0.1–0.6)
MONOCYTES # BLD AUTO: 0.47 K/UL — SIGNIFICANT CHANGE UP (ref 0.1–0.6)
MONOCYTES # BLD AUTO: 0.5 K/UL — SIGNIFICANT CHANGE UP (ref 0.1–0.6)
MONOCYTES # BLD AUTO: 0.54 K/UL — SIGNIFICANT CHANGE UP (ref 0.1–0.6)
MONOCYTES # BLD AUTO: 0.61 K/UL — HIGH (ref 0.1–0.6)
MONOCYTES # BLD AUTO: 0.62 K/UL — HIGH (ref 0.1–0.6)
MONOCYTES # BLD AUTO: 0.64 K/UL — HIGH (ref 0.1–0.6)
MONOCYTES # BLD AUTO: 0.66 K/UL — HIGH (ref 0.1–0.6)
MONOCYTES # BLD AUTO: 0.74 K/UL — HIGH (ref 0.1–0.6)
MONOCYTES # BLD AUTO: 0.81 K/UL — HIGH (ref 0.1–0.6)
MONOCYTES # BLD AUTO: 1.14 K/UL — HIGH (ref 0.1–0.6)
MONOCYTES NFR BLD AUTO: 1 % — LOW (ref 1.7–9.3)
MONOCYTES NFR BLD AUTO: 1.2 % — LOW (ref 1.7–9.3)
MONOCYTES NFR BLD AUTO: 1.4 % — LOW (ref 1.7–9.3)
MONOCYTES NFR BLD AUTO: 2.3 % — SIGNIFICANT CHANGE UP (ref 1.7–9.3)
MONOCYTES NFR BLD AUTO: 3.1 % — SIGNIFICANT CHANGE UP (ref 1.7–9.3)
MONOCYTES NFR BLD AUTO: 3.5 % — SIGNIFICANT CHANGE UP (ref 1.7–9.3)
MONOCYTES NFR BLD AUTO: 3.9 % — SIGNIFICANT CHANGE UP (ref 1.7–9.3)
MONOCYTES NFR BLD AUTO: 4 % — SIGNIFICANT CHANGE UP (ref 1.7–9.3)
MONOCYTES NFR BLD AUTO: 4.3 % — SIGNIFICANT CHANGE UP (ref 1.7–9.3)
MONOCYTES NFR BLD AUTO: 4.6 % — SIGNIFICANT CHANGE UP (ref 1.7–9.3)
MONOCYTES NFR BLD AUTO: 5.8 % — SIGNIFICANT CHANGE UP (ref 1.7–9.3)
MONOCYTES NFR BLD AUTO: 6 % — SIGNIFICANT CHANGE UP (ref 1.7–9.3)
MONOCYTES NFR BLD AUTO: 6.5 % — SIGNIFICANT CHANGE UP (ref 1.7–9.3)
MONOCYTES NFR BLD AUTO: 6.6 % — SIGNIFICANT CHANGE UP (ref 1.7–9.3)
MONOCYTES NFR BLD AUTO: 7.5 % — SIGNIFICANT CHANGE UP (ref 1.7–9.3)
MONOCYTES NFR BLD AUTO: 9.5 % — HIGH (ref 1.7–9.3)
MRSA PCR RESULT.: NEGATIVE — SIGNIFICANT CHANGE UP
NEUTROPHILS # BLD AUTO: 11.99 K/UL — HIGH (ref 1.4–6.5)
NEUTROPHILS # BLD AUTO: 13.18 K/UL — HIGH (ref 1.4–6.5)
NEUTROPHILS # BLD AUTO: 13.67 K/UL — HIGH (ref 1.4–6.5)
NEUTROPHILS # BLD AUTO: 13.85 K/UL — HIGH (ref 1.4–6.5)
NEUTROPHILS # BLD AUTO: 14.07 K/UL — HIGH (ref 1.4–6.5)
NEUTROPHILS # BLD AUTO: 14.22 K/UL — HIGH (ref 1.4–6.5)
NEUTROPHILS # BLD AUTO: 14.72 K/UL — HIGH (ref 1.4–6.5)
NEUTROPHILS # BLD AUTO: 15.19 K/UL — HIGH (ref 1.4–6.5)
NEUTROPHILS # BLD AUTO: 25.57 K/UL — HIGH (ref 1.4–6.5)
NEUTROPHILS # BLD AUTO: 4.91 K/UL — SIGNIFICANT CHANGE UP (ref 1.4–6.5)
NEUTROPHILS # BLD AUTO: 5.12 K/UL — SIGNIFICANT CHANGE UP (ref 1.4–6.5)
NEUTROPHILS # BLD AUTO: 5.95 K/UL — SIGNIFICANT CHANGE UP (ref 1.4–6.5)
NEUTROPHILS # BLD AUTO: 6.6 K/UL — HIGH (ref 1.4–6.5)
NEUTROPHILS # BLD AUTO: 8.6 K/UL — HIGH (ref 1.4–6.5)
NEUTROPHILS # BLD AUTO: 9.51 K/UL — HIGH (ref 1.4–6.5)
NEUTROPHILS # BLD AUTO: 9.85 K/UL — HIGH (ref 1.4–6.5)
NEUTROPHILS NFR BLD AUTO: 74 % — SIGNIFICANT CHANGE UP (ref 42.2–75.2)
NEUTROPHILS NFR BLD AUTO: 77.8 % — HIGH (ref 42.2–75.2)
NEUTROPHILS NFR BLD AUTO: 77.9 % — HIGH (ref 42.2–75.2)
NEUTROPHILS NFR BLD AUTO: 79.7 % — HIGH (ref 42.2–75.2)
NEUTROPHILS NFR BLD AUTO: 79.8 % — HIGH (ref 42.2–75.2)
NEUTROPHILS NFR BLD AUTO: 82.8 % — HIGH (ref 42.2–75.2)
NEUTROPHILS NFR BLD AUTO: 84.6 % — HIGH (ref 42.2–75.2)
NEUTROPHILS NFR BLD AUTO: 85.6 % — HIGH (ref 42.2–75.2)
NEUTROPHILS NFR BLD AUTO: 85.7 % — HIGH (ref 42.2–75.2)
NEUTROPHILS NFR BLD AUTO: 86.7 % — HIGH (ref 42.2–75.2)
NEUTROPHILS NFR BLD AUTO: 88.1 % — HIGH (ref 42.2–75.2)
NEUTROPHILS NFR BLD AUTO: 89 % — HIGH (ref 42.2–75.2)
NEUTROPHILS NFR BLD AUTO: 90.6 % — HIGH (ref 42.2–75.2)
NEUTROPHILS NFR BLD AUTO: 92.3 % — HIGH (ref 42.2–75.2)
NEUTROPHILS NFR BLD AUTO: 92.5 % — HIGH (ref 42.2–75.2)
NEUTROPHILS NFR BLD AUTO: 93 % — HIGH (ref 42.2–75.2)
NEUTS BAND # BLD: 19 % — HIGH (ref 0–6)
NEUTS VAC BLD QL SMEAR: SLIGHT — SIGNIFICANT CHANGE UP
NITRITE UR-MCNC: NEGATIVE — SIGNIFICANT CHANGE UP
NRBC # BLD: 0 /100 WBCS — SIGNIFICANT CHANGE UP (ref 0–0)
NRBC # BLD: SIGNIFICANT CHANGE UP /100 WBCS (ref 0–0)
NRBC # BLD: SIGNIFICANT CHANGE UP /100 WBCS (ref 0–0)
ORGANISM # SPEC MICROSCOPIC CNT: ABNORMAL
ORGANISM # SPEC MICROSCOPIC CNT: ABNORMAL
ORGANISM # SPEC MICROSCOPIC CNT: SIGNIFICANT CHANGE UP
ORGANISM # SPEC MICROSCOPIC CNT: SIGNIFICANT CHANGE UP
OVALOCYTES BLD QL SMEAR: SLIGHT — SIGNIFICANT CHANGE UP
PCO2 BLDA: 28 MMHG — LOW (ref 32–45)
PCO2 BLDV: 40 MMHG — SIGNIFICANT CHANGE UP (ref 39–42)
PH BLDA: 7.41 — SIGNIFICANT CHANGE UP (ref 7.35–7.45)
PH BLDV: 7.34 — SIGNIFICANT CHANGE UP (ref 7.32–7.43)
PH UR: 5 — SIGNIFICANT CHANGE UP (ref 5–8)
PHOSPHATE SERPL-MCNC: 1.7 MG/DL — LOW (ref 2.1–4.9)
PHOSPHATE SERPL-MCNC: 2.2 MG/DL — SIGNIFICANT CHANGE UP (ref 2.1–4.9)
PHOSPHATE SERPL-MCNC: 2.3 MG/DL — SIGNIFICANT CHANGE UP (ref 2.1–4.9)
PHOSPHATE SERPL-MCNC: 2.5 MG/DL — SIGNIFICANT CHANGE UP (ref 2.1–4.9)
PHOSPHATE SERPL-MCNC: 2.6 MG/DL — SIGNIFICANT CHANGE UP (ref 2.1–4.9)
PHOSPHATE SERPL-MCNC: 2.8 MG/DL — SIGNIFICANT CHANGE UP (ref 2.1–4.9)
PHOSPHATE SERPL-MCNC: 3.1 MG/DL — SIGNIFICANT CHANGE UP (ref 2.1–4.9)
PHOSPHATE SERPL-MCNC: 4 MG/DL — SIGNIFICANT CHANGE UP (ref 2.1–4.9)
PLAT MORPH BLD: ABNORMAL
PLATELET # BLD AUTO: 190 K/UL — SIGNIFICANT CHANGE UP (ref 130–400)
PLATELET # BLD AUTO: 191 K/UL — SIGNIFICANT CHANGE UP (ref 130–400)
PLATELET # BLD AUTO: 205 K/UL — SIGNIFICANT CHANGE UP (ref 130–400)
PLATELET # BLD AUTO: 213 K/UL — SIGNIFICANT CHANGE UP (ref 130–400)
PLATELET # BLD AUTO: 218 K/UL — SIGNIFICANT CHANGE UP (ref 130–400)
PLATELET # BLD AUTO: 223 K/UL — SIGNIFICANT CHANGE UP (ref 130–400)
PLATELET # BLD AUTO: 229 K/UL — SIGNIFICANT CHANGE UP (ref 130–400)
PLATELET # BLD AUTO: 232 K/UL — SIGNIFICANT CHANGE UP (ref 130–400)
PLATELET # BLD AUTO: 235 K/UL — SIGNIFICANT CHANGE UP (ref 130–400)
PLATELET # BLD AUTO: 235 K/UL — SIGNIFICANT CHANGE UP (ref 130–400)
PLATELET # BLD AUTO: 237 K/UL — SIGNIFICANT CHANGE UP (ref 130–400)
PLATELET # BLD AUTO: 238 K/UL — SIGNIFICANT CHANGE UP (ref 130–400)
PLATELET # BLD AUTO: 247 K/UL — SIGNIFICANT CHANGE UP (ref 130–400)
PLATELET # BLD AUTO: 251 K/UL — SIGNIFICANT CHANGE UP (ref 130–400)
PLATELET # BLD AUTO: 258 K/UL — SIGNIFICANT CHANGE UP (ref 130–400)
PLATELET # BLD AUTO: 262 K/UL — SIGNIFICANT CHANGE UP (ref 130–400)
PLATELET # BLD AUTO: 269 K/UL — SIGNIFICANT CHANGE UP (ref 130–400)
PLATELET # BLD AUTO: 283 K/UL — SIGNIFICANT CHANGE UP (ref 130–400)
PLATELET # BLD AUTO: 313 K/UL — SIGNIFICANT CHANGE UP (ref 130–400)
PLATELET # BLD AUTO: 344 K/UL — SIGNIFICANT CHANGE UP (ref 130–400)
PLATELET # BLD AUTO: 344 K/UL — SIGNIFICANT CHANGE UP (ref 130–400)
PLATELET CLUMP BLD QL SMEAR: SLIGHT
PMV BLD: 11.5 FL — HIGH (ref 7.4–10.4)
PMV BLD: 11.8 FL — HIGH (ref 7.4–10.4)
PMV BLD: 12 FL — HIGH (ref 7.4–10.4)
PMV BLD: 12.4 FL — HIGH (ref 7.4–10.4)
PMV BLD: 12.7 FL — HIGH (ref 7.4–10.4)
PMV BLD: 12.8 FL — HIGH (ref 7.4–10.4)
PMV BLD: 12.9 FL — HIGH (ref 7.4–10.4)
PMV BLD: 13.1 FL — HIGH (ref 7.4–10.4)
PMV BLD: 13.1 FL — HIGH (ref 7.4–10.4)
PMV BLD: 13.4 FL — HIGH (ref 7.4–10.4)
PMV BLD: 13.5 FL — HIGH (ref 7.4–10.4)
PMV BLD: 14 FL — HIGH (ref 7.4–10.4)
PMV BLD: 14.2 FL — HIGH (ref 7.4–10.4)
PMV BLD: 14.3 FL — HIGH (ref 7.4–10.4)
PMV BLD: 14.4 FL — HIGH (ref 7.4–10.4)
PMV BLD: 14.5 FL — HIGH (ref 7.4–10.4)
PMV BLD: 14.5 FL — HIGH (ref 7.4–10.4)
PMV BLD: 14.7 FL — HIGH (ref 7.4–10.4)
PMV BLD: SIGNIFICANT CHANGE UP (ref 7.4–10.4)
PO2 BLDA: 71 MMHG — LOW (ref 83–108)
PO2 BLDV: 47 MMHG — HIGH (ref 25–45)
POTASSIUM BLDV-SCNC: 6.9 MMOL/L — CRITICAL HIGH (ref 3.5–5.1)
POTASSIUM SERPL-MCNC: 3.3 MMOL/L — LOW (ref 3.5–5)
POTASSIUM SERPL-MCNC: 3.4 MMOL/L — LOW (ref 3.5–5)
POTASSIUM SERPL-MCNC: 3.5 MMOL/L — SIGNIFICANT CHANGE UP (ref 3.5–5)
POTASSIUM SERPL-MCNC: 3.5 MMOL/L — SIGNIFICANT CHANGE UP (ref 3.5–5)
POTASSIUM SERPL-MCNC: 3.6 MMOL/L — SIGNIFICANT CHANGE UP (ref 3.5–5)
POTASSIUM SERPL-MCNC: 3.6 MMOL/L — SIGNIFICANT CHANGE UP (ref 3.5–5)
POTASSIUM SERPL-MCNC: 3.7 MMOL/L — SIGNIFICANT CHANGE UP (ref 3.5–5)
POTASSIUM SERPL-MCNC: 3.9 MMOL/L — SIGNIFICANT CHANGE UP (ref 3.5–5)
POTASSIUM SERPL-MCNC: 4 MMOL/L — SIGNIFICANT CHANGE UP (ref 3.5–5)
POTASSIUM SERPL-MCNC: 4.1 MMOL/L — SIGNIFICANT CHANGE UP (ref 3.5–5)
POTASSIUM SERPL-MCNC: 4.1 MMOL/L — SIGNIFICANT CHANGE UP (ref 3.5–5)
POTASSIUM SERPL-MCNC: 4.2 MMOL/L — SIGNIFICANT CHANGE UP (ref 3.5–5)
POTASSIUM SERPL-MCNC: 4.3 MMOL/L — SIGNIFICANT CHANGE UP (ref 3.5–5)
POTASSIUM SERPL-MCNC: 4.4 MMOL/L — SIGNIFICANT CHANGE UP (ref 3.5–5)
POTASSIUM SERPL-MCNC: 4.4 MMOL/L — SIGNIFICANT CHANGE UP (ref 3.5–5)
POTASSIUM SERPL-MCNC: 4.5 MMOL/L — SIGNIFICANT CHANGE UP (ref 3.5–5)
POTASSIUM SERPL-MCNC: 4.6 MMOL/L — SIGNIFICANT CHANGE UP (ref 3.5–5)
POTASSIUM SERPL-MCNC: 4.7 MMOL/L — SIGNIFICANT CHANGE UP (ref 3.5–5)
POTASSIUM SERPL-MCNC: 5.1 MMOL/L — HIGH (ref 3.5–5)
POTASSIUM SERPL-MCNC: 5.1 MMOL/L — HIGH (ref 3.5–5)
POTASSIUM SERPL-MCNC: 5.2 MMOL/L — HIGH (ref 3.5–5)
POTASSIUM SERPL-MCNC: 5.7 MMOL/L — HIGH (ref 3.5–5)
POTASSIUM SERPL-SCNC: 3.3 MMOL/L — LOW (ref 3.5–5)
POTASSIUM SERPL-SCNC: 3.4 MMOL/L — LOW (ref 3.5–5)
POTASSIUM SERPL-SCNC: 3.5 MMOL/L — SIGNIFICANT CHANGE UP (ref 3.5–5)
POTASSIUM SERPL-SCNC: 3.5 MMOL/L — SIGNIFICANT CHANGE UP (ref 3.5–5)
POTASSIUM SERPL-SCNC: 3.6 MMOL/L — SIGNIFICANT CHANGE UP (ref 3.5–5)
POTASSIUM SERPL-SCNC: 3.6 MMOL/L — SIGNIFICANT CHANGE UP (ref 3.5–5)
POTASSIUM SERPL-SCNC: 3.7 MMOL/L — SIGNIFICANT CHANGE UP (ref 3.5–5)
POTASSIUM SERPL-SCNC: 3.9 MMOL/L — SIGNIFICANT CHANGE UP (ref 3.5–5)
POTASSIUM SERPL-SCNC: 4 MMOL/L — SIGNIFICANT CHANGE UP (ref 3.5–5)
POTASSIUM SERPL-SCNC: 4.1 MMOL/L — SIGNIFICANT CHANGE UP (ref 3.5–5)
POTASSIUM SERPL-SCNC: 4.1 MMOL/L — SIGNIFICANT CHANGE UP (ref 3.5–5)
POTASSIUM SERPL-SCNC: 4.2 MMOL/L — SIGNIFICANT CHANGE UP (ref 3.5–5)
POTASSIUM SERPL-SCNC: 4.3 MMOL/L — SIGNIFICANT CHANGE UP (ref 3.5–5)
POTASSIUM SERPL-SCNC: 4.4 MMOL/L — SIGNIFICANT CHANGE UP (ref 3.5–5)
POTASSIUM SERPL-SCNC: 4.4 MMOL/L — SIGNIFICANT CHANGE UP (ref 3.5–5)
POTASSIUM SERPL-SCNC: 4.5 MMOL/L — SIGNIFICANT CHANGE UP (ref 3.5–5)
POTASSIUM SERPL-SCNC: 4.6 MMOL/L — SIGNIFICANT CHANGE UP (ref 3.5–5)
POTASSIUM SERPL-SCNC: 4.7 MMOL/L — SIGNIFICANT CHANGE UP (ref 3.5–5)
POTASSIUM SERPL-SCNC: 5.1 MMOL/L — HIGH (ref 3.5–5)
POTASSIUM SERPL-SCNC: 5.1 MMOL/L — HIGH (ref 3.5–5)
POTASSIUM SERPL-SCNC: 5.2 MMOL/L — HIGH (ref 3.5–5)
POTASSIUM SERPL-SCNC: 5.7 MMOL/L — HIGH (ref 3.5–5)
PROCALCITONIN SERPL-MCNC: 0.08 NG/ML — SIGNIFICANT CHANGE UP (ref 0.02–0.1)
PROCALCITONIN SERPL-MCNC: 0.16 NG/ML — HIGH (ref 0.02–0.1)
PROT SERPL-MCNC: 4.1 G/DL — LOW (ref 6–8)
PROT SERPL-MCNC: 4.2 G/DL — LOW (ref 6–8)
PROT SERPL-MCNC: 4.2 G/DL — LOW (ref 6–8)
PROT SERPL-MCNC: 4.3 G/DL — LOW (ref 6–8)
PROT SERPL-MCNC: 4.4 G/DL — LOW (ref 6–8)
PROT SERPL-MCNC: 4.5 G/DL — LOW (ref 6–8)
PROT SERPL-MCNC: 4.6 G/DL — LOW (ref 6–8)
PROT SERPL-MCNC: 4.6 G/DL — LOW (ref 6–8)
PROT SERPL-MCNC: 4.7 G/DL — LOW (ref 6–8)
PROT SERPL-MCNC: 4.8 G/DL — LOW (ref 6–8)
PROT SERPL-MCNC: 5 G/DL — LOW (ref 6–8)
PROT SERPL-MCNC: 5.1 G/DL — LOW (ref 6–8)
PROT SERPL-MCNC: 5.1 G/DL — LOW (ref 6–8)
PROT SERPL-MCNC: 5.2 G/DL — LOW (ref 6–8)
PROT SERPL-MCNC: 5.3 G/DL — LOW (ref 6–8)
PROT SERPL-MCNC: 5.5 G/DL — LOW (ref 6–8)
PROT SERPL-MCNC: 5.6 G/DL — LOW (ref 6–8)
PROT SERPL-MCNC: 7.4 G/DL — SIGNIFICANT CHANGE UP (ref 6–8)
PROT UR-MCNC: NEGATIVE MG/DL — SIGNIFICANT CHANGE UP
PROTHROM AB SERPL-ACNC: 20.5 SEC — HIGH (ref 9.95–12.87)
RBC # BLD: 2.93 M/UL — LOW (ref 4.2–5.4)
RBC # BLD: 3 M/UL — LOW (ref 4.2–5.4)
RBC # BLD: 3.14 M/UL — LOW (ref 4.2–5.4)
RBC # BLD: 3.16 M/UL — LOW (ref 4.2–5.4)
RBC # BLD: 3.23 M/UL — LOW (ref 4.2–5.4)
RBC # BLD: 3.34 M/UL — LOW (ref 4.2–5.4)
RBC # BLD: 3.36 M/UL — LOW (ref 4.2–5.4)
RBC # BLD: 3.43 M/UL — LOW (ref 4.2–5.4)
RBC # BLD: 3.49 M/UL — LOW (ref 4.2–5.4)
RBC # BLD: 3.5 M/UL — LOW (ref 4.2–5.4)
RBC # BLD: 3.76 M/UL — LOW (ref 4.2–5.4)
RBC # BLD: 3.82 M/UL — LOW (ref 4.2–5.4)
RBC # BLD: 3.98 M/UL — LOW (ref 4.2–5.4)
RBC # BLD: 3.99 M/UL — LOW (ref 4.2–5.4)
RBC # BLD: 4 M/UL — LOW (ref 4.2–5.4)
RBC # BLD: 4.01 M/UL — LOW (ref 4.2–5.4)
RBC # BLD: 4.06 M/UL — LOW (ref 4.2–5.4)
RBC # BLD: 4.12 M/UL — LOW (ref 4.2–5.4)
RBC # BLD: 4.16 M/UL — LOW (ref 4.2–5.4)
RBC # BLD: 4.37 M/UL — SIGNIFICANT CHANGE UP (ref 4.2–5.4)
RBC # BLD: 5.54 M/UL — HIGH (ref 4.2–5.4)
RBC # FLD: 13.6 % — SIGNIFICANT CHANGE UP (ref 11.5–14.5)
RBC # FLD: 13.7 % — SIGNIFICANT CHANGE UP (ref 11.5–14.5)
RBC # FLD: 13.9 % — SIGNIFICANT CHANGE UP (ref 11.5–14.5)
RBC # FLD: 14.1 % — SIGNIFICANT CHANGE UP (ref 11.5–14.5)
RBC # FLD: 14.2 % — SIGNIFICANT CHANGE UP (ref 11.5–14.5)
RBC # FLD: 14.2 % — SIGNIFICANT CHANGE UP (ref 11.5–14.5)
RBC # FLD: 14.4 % — SIGNIFICANT CHANGE UP (ref 11.5–14.5)
RBC # FLD: 14.5 % — SIGNIFICANT CHANGE UP (ref 11.5–14.5)
RBC # FLD: 14.6 % — HIGH (ref 11.5–14.5)
RBC # FLD: 14.6 % — HIGH (ref 11.5–14.5)
RBC # FLD: 14.7 % — HIGH (ref 11.5–14.5)
RBC BLD AUTO: ABNORMAL
RBC CASTS # UR COMP ASSIST: 2 /HPF — SIGNIFICANT CHANGE UP (ref 0–4)
RSV RNA NPH QL NAA+NON-PROBE: SIGNIFICANT CHANGE UP
SAO2 % BLDA: 97 % — SIGNIFICANT CHANGE UP (ref 94–98)
SAO2 % BLDV: 76 % — SIGNIFICANT CHANGE UP (ref 67–88)
SARS-COV-2 RNA SPEC QL NAA+PROBE: DETECTED
SODIUM SERPL-SCNC: 135 MMOL/L — SIGNIFICANT CHANGE UP (ref 135–146)
SODIUM SERPL-SCNC: 136 MMOL/L — SIGNIFICANT CHANGE UP (ref 135–146)
SODIUM SERPL-SCNC: 136 MMOL/L — SIGNIFICANT CHANGE UP (ref 135–146)
SODIUM SERPL-SCNC: 137 MMOL/L — SIGNIFICANT CHANGE UP (ref 135–146)
SODIUM SERPL-SCNC: 138 MMOL/L — SIGNIFICANT CHANGE UP (ref 135–146)
SODIUM SERPL-SCNC: 138 MMOL/L — SIGNIFICANT CHANGE UP (ref 135–146)
SODIUM SERPL-SCNC: 139 MMOL/L — SIGNIFICANT CHANGE UP (ref 135–146)
SODIUM SERPL-SCNC: 140 MMOL/L — SIGNIFICANT CHANGE UP (ref 135–146)
SODIUM SERPL-SCNC: 141 MMOL/L — SIGNIFICANT CHANGE UP (ref 135–146)
SODIUM SERPL-SCNC: 142 MMOL/L — SIGNIFICANT CHANGE UP (ref 135–146)
SODIUM SERPL-SCNC: 142 MMOL/L — SIGNIFICANT CHANGE UP (ref 135–146)
SODIUM SERPL-SCNC: 144 MMOL/L — SIGNIFICANT CHANGE UP (ref 135–146)
SODIUM SERPL-SCNC: 145 MMOL/L — SIGNIFICANT CHANGE UP (ref 135–146)
SODIUM SERPL-SCNC: 147 MMOL/L — HIGH (ref 135–146)
SODIUM SERPL-SCNC: 152 MMOL/L — HIGH (ref 135–146)
SODIUM SERPL-SCNC: 153 MMOL/L — HIGH (ref 135–146)
SODIUM SERPL-SCNC: 153 MMOL/L — HIGH (ref 135–146)
SODIUM SERPL-SCNC: 154 MMOL/L — HIGH (ref 135–146)
SODIUM SERPL-SCNC: 155 MMOL/L — HIGH (ref 135–146)
SODIUM SERPL-SCNC: 159 MMOL/L — HIGH (ref 135–146)
SODIUM SERPL-SCNC: 163 MMOL/L — CRITICAL HIGH (ref 135–146)
SODIUM SERPL-SCNC: 164 MMOL/L — CRITICAL HIGH (ref 135–146)
SP GR SPEC: >1.03 — SIGNIFICANT CHANGE UP (ref 1–1.03)
SPECIMEN SOURCE: SIGNIFICANT CHANGE UP
T4 AB SER-ACNC: 6 UG/DL — SIGNIFICANT CHANGE UP (ref 4.6–12)
TROPONIN T, HIGH SENSITIVITY RESULT: 140 NG/L — CRITICAL HIGH (ref 6–13)
TROPONIN T, HIGH SENSITIVITY RESULT: 142 NG/L — CRITICAL HIGH (ref 6–13)
TROPONIN T, HIGH SENSITIVITY RESULT: 214 NG/L — CRITICAL HIGH (ref 6–13)
TSH SERPL-MCNC: 1.84 UIU/ML — SIGNIFICANT CHANGE UP (ref 0.27–4.2)
UROBILINOGEN FLD QL: 1 MG/DL — SIGNIFICANT CHANGE UP (ref 0.2–1)
VANCOMYCIN FLD-MCNC: 12.1 UG/ML — HIGH (ref 5–10)
VANCOMYCIN FLD-MCNC: 33.8 UG/ML — HIGH (ref 5–10)
VANCOMYCIN TROUGH SERPL-MCNC: 15.9 UG/ML — HIGH (ref 5–10)
WBC # BLD: 11.03 K/UL — HIGH (ref 4.8–10.8)
WBC # BLD: 11.23 K/UL — HIGH (ref 4.8–10.8)
WBC # BLD: 11.5 K/UL — HIGH (ref 4.8–10.8)
WBC # BLD: 11.81 K/UL — HIGH (ref 4.8–10.8)
WBC # BLD: 12.03 K/UL — HIGH (ref 4.8–10.8)
WBC # BLD: 12.59 K/UL — HIGH (ref 4.8–10.8)
WBC # BLD: 13.83 K/UL — HIGH (ref 4.8–10.8)
WBC # BLD: 14.18 K/UL — HIGH (ref 4.8–10.8)
WBC # BLD: 14.72 K/UL — HIGH (ref 4.8–10.8)
WBC # BLD: 15.22 K/UL — HIGH (ref 4.8–10.8)
WBC # BLD: 15.27 K/UL — HIGH (ref 4.8–10.8)
WBC # BLD: 15.4 K/UL — HIGH (ref 4.8–10.8)
WBC # BLD: 15.83 K/UL — HIGH (ref 4.8–10.8)
WBC # BLD: 15.96 K/UL — HIGH (ref 4.8–10.8)
WBC # BLD: 17.25 K/UL — HIGH (ref 4.8–10.8)
WBC # BLD: 22.38 K/UL — HIGH (ref 4.8–10.8)
WBC # BLD: 28.41 K/UL — HIGH (ref 4.8–10.8)
WBC # BLD: 6.16 K/UL — SIGNIFICANT CHANGE UP (ref 4.8–10.8)
WBC # BLD: 6.42 K/UL — SIGNIFICANT CHANGE UP (ref 4.8–10.8)
WBC # BLD: 7.19 K/UL — SIGNIFICANT CHANGE UP (ref 4.8–10.8)
WBC # BLD: 8.49 K/UL — SIGNIFICANT CHANGE UP (ref 4.8–10.8)
WBC # FLD AUTO: 11.03 K/UL — HIGH (ref 4.8–10.8)
WBC # FLD AUTO: 11.23 K/UL — HIGH (ref 4.8–10.8)
WBC # FLD AUTO: 11.5 K/UL — HIGH (ref 4.8–10.8)
WBC # FLD AUTO: 11.81 K/UL — HIGH (ref 4.8–10.8)
WBC # FLD AUTO: 12.03 K/UL — HIGH (ref 4.8–10.8)
WBC # FLD AUTO: 12.59 K/UL — HIGH (ref 4.8–10.8)
WBC # FLD AUTO: 13.83 K/UL — HIGH (ref 4.8–10.8)
WBC # FLD AUTO: 14.18 K/UL — HIGH (ref 4.8–10.8)
WBC # FLD AUTO: 14.72 K/UL — HIGH (ref 4.8–10.8)
WBC # FLD AUTO: 15.22 K/UL — HIGH (ref 4.8–10.8)
WBC # FLD AUTO: 15.27 K/UL — HIGH (ref 4.8–10.8)
WBC # FLD AUTO: 15.4 K/UL — HIGH (ref 4.8–10.8)
WBC # FLD AUTO: 15.83 K/UL — HIGH (ref 4.8–10.8)
WBC # FLD AUTO: 15.96 K/UL — HIGH (ref 4.8–10.8)
WBC # FLD AUTO: 17.25 K/UL — HIGH (ref 4.8–10.8)
WBC # FLD AUTO: 22.38 K/UL — HIGH (ref 4.8–10.8)
WBC # FLD AUTO: 28.41 K/UL — HIGH (ref 4.8–10.8)
WBC # FLD AUTO: 6.16 K/UL — SIGNIFICANT CHANGE UP (ref 4.8–10.8)
WBC # FLD AUTO: 6.42 K/UL — SIGNIFICANT CHANGE UP (ref 4.8–10.8)
WBC # FLD AUTO: 7.19 K/UL — SIGNIFICANT CHANGE UP (ref 4.8–10.8)
WBC # FLD AUTO: 8.49 K/UL — SIGNIFICANT CHANGE UP (ref 4.8–10.8)
WBC UR QL: 2 /HPF — SIGNIFICANT CHANGE UP (ref 0–5)

## 2024-01-01 PROCEDURE — 99232 SBSQ HOSP IP/OBS MODERATE 35: CPT

## 2024-01-01 PROCEDURE — 82803 BLOOD GASES ANY COMBINATION: CPT

## 2024-01-01 PROCEDURE — 87641 MR-STAPH DNA AMP PROBE: CPT

## 2024-01-01 PROCEDURE — 93306 TTE W/DOPPLER COMPLETE: CPT | Mod: 26

## 2024-01-01 PROCEDURE — 71250 CT THORAX DX C-: CPT | Mod: MC

## 2024-01-01 PROCEDURE — 71045 X-RAY EXAM CHEST 1 VIEW: CPT | Mod: 26

## 2024-01-01 PROCEDURE — 99497 ADVNCD CARE PLAN 30 MIN: CPT | Mod: 25

## 2024-01-01 PROCEDURE — 99233 SBSQ HOSP IP/OBS HIGH 50: CPT

## 2024-01-01 PROCEDURE — 92526 ORAL FUNCTION THERAPY: CPT | Mod: GN

## 2024-01-01 PROCEDURE — 71045 X-RAY EXAM CHEST 1 VIEW: CPT

## 2024-01-01 PROCEDURE — 94640 AIRWAY INHALATION TREATMENT: CPT

## 2024-01-01 PROCEDURE — 84436 ASSAY OF TOTAL THYROXINE: CPT

## 2024-01-01 PROCEDURE — 84295 ASSAY OF SERUM SODIUM: CPT

## 2024-01-01 PROCEDURE — 85025 COMPLETE CBC W/AUTO DIFF WBC: CPT

## 2024-01-01 PROCEDURE — 94660 CPAP INITIATION&MGMT: CPT

## 2024-01-01 PROCEDURE — 74176 CT ABD & PELVIS W/O CONTRAST: CPT | Mod: 26,MC

## 2024-01-01 PROCEDURE — 93010 ELECTROCARDIOGRAM REPORT: CPT

## 2024-01-01 PROCEDURE — 71250 CT THORAX DX C-: CPT | Mod: 26

## 2024-01-01 PROCEDURE — 99223 1ST HOSP IP/OBS HIGH 75: CPT

## 2024-01-01 PROCEDURE — 82330 ASSAY OF CALCIUM: CPT

## 2024-01-01 PROCEDURE — 84132 ASSAY OF SERUM POTASSIUM: CPT

## 2024-01-01 PROCEDURE — 82962 GLUCOSE BLOOD TEST: CPT

## 2024-01-01 PROCEDURE — 99291 CRITICAL CARE FIRST HOUR: CPT

## 2024-01-01 PROCEDURE — 93005 ELECTROCARDIOGRAM TRACING: CPT

## 2024-01-01 PROCEDURE — 71045 X-RAY EXAM CHEST 1 VIEW: CPT | Mod: 26,77

## 2024-01-01 PROCEDURE — 36415 COLL VENOUS BLD VENIPUNCTURE: CPT

## 2024-01-01 PROCEDURE — 80053 COMPREHEN METABOLIC PANEL: CPT

## 2024-01-01 PROCEDURE — 83735 ASSAY OF MAGNESIUM: CPT

## 2024-01-01 PROCEDURE — 85014 HEMATOCRIT: CPT

## 2024-01-01 PROCEDURE — 84145 PROCALCITONIN (PCT): CPT

## 2024-01-01 PROCEDURE — 87640 STAPH A DNA AMP PROBE: CPT

## 2024-01-01 PROCEDURE — 80048 BASIC METABOLIC PNL TOTAL CA: CPT

## 2024-01-01 PROCEDURE — 85018 HEMOGLOBIN: CPT

## 2024-01-01 PROCEDURE — 92610 EVALUATE SWALLOWING FUNCTION: CPT | Mod: GN

## 2024-01-01 PROCEDURE — 82550 ASSAY OF CK (CPK): CPT

## 2024-01-01 PROCEDURE — 84100 ASSAY OF PHOSPHORUS: CPT

## 2024-01-01 PROCEDURE — 84484 ASSAY OF TROPONIN QUANT: CPT

## 2024-01-01 PROCEDURE — 85027 COMPLETE CBC AUTOMATED: CPT

## 2024-01-01 PROCEDURE — 86900 BLOOD TYPING SEROLOGIC ABO: CPT

## 2024-01-01 PROCEDURE — 0241U: CPT

## 2024-01-01 PROCEDURE — 80202 ASSAY OF VANCOMYCIN: CPT

## 2024-01-01 PROCEDURE — 94760 N-INVAS EAR/PLS OXIMETRY 1: CPT

## 2024-01-01 PROCEDURE — 86850 RBC ANTIBODY SCREEN: CPT

## 2024-01-01 PROCEDURE — 86901 BLOOD TYPING SEROLOGIC RH(D): CPT

## 2024-01-01 PROCEDURE — 83605 ASSAY OF LACTIC ACID: CPT

## 2024-01-01 PROCEDURE — 84443 ASSAY THYROID STIM HORMONE: CPT

## 2024-01-01 PROCEDURE — 93306 TTE W/DOPPLER COMPLETE: CPT

## 2024-01-01 RX ORDER — ATROPINE SULFATE MONOHYDRATE 1 G/G
0.5 POWDER MISCELLANEOUS ONCE
Refills: 0 | Status: DISCONTINUED | OUTPATIENT
Start: 2024-01-01 | End: 2024-01-01

## 2024-01-01 RX ORDER — MIDODRINE HYDROCHLORIDE 5 MG/1
10 TABLET ORAL EVERY 8 HOURS
Refills: 0 | Status: DISCONTINUED | OUTPATIENT
Start: 2024-01-01 | End: 2024-01-01

## 2024-01-01 RX ORDER — ETHACRYNIC ACID 25 MG/1
25 TABLET ORAL ONCE
Refills: 0 | Status: COMPLETED | OUTPATIENT
Start: 2024-01-01 | End: 2024-01-01

## 2024-01-01 RX ORDER — POLYETHYLENE GLYCOL 3350 17 G/17G
17 POWDER, FOR SOLUTION ORAL
Refills: 0 | Status: DISCONTINUED | OUTPATIENT
Start: 2024-01-01 | End: 2024-01-01

## 2024-01-01 RX ORDER — ROPIVACAINE IN 0.9% SOD CHL/PF 0.1 %
0.1 PLASTIC BAG, INJECTION (ML) EPIDURAL
Qty: 8 | Refills: 0 | Status: DISCONTINUED | OUTPATIENT
Start: 2024-01-01 | End: 2024-01-01

## 2024-01-01 RX ORDER — POTASSIUM CHLORIDE 10 MEQ
20 TABLET, EXT RELEASE, PARTICLES/CRYSTALS ORAL
Refills: 0 | Status: COMPLETED | OUTPATIENT
Start: 2024-01-01 | End: 2024-01-01

## 2024-01-01 RX ORDER — ETHACRYNIC ACID 25 MG/1
50 TABLET ORAL ONCE
Refills: 0 | Status: DISCONTINUED | OUTPATIENT
Start: 2024-01-01 | End: 2024-01-01

## 2024-01-01 RX ORDER — CLOTRIMAZOLE 1 %
1 CREAM (GRAM) TOPICAL EVERY 12 HOURS
Refills: 0 | Status: DISCONTINUED | OUTPATIENT
Start: 2024-01-01 | End: 2024-01-01

## 2024-01-01 RX ORDER — CEFEPIME 2 G/1
1000 INJECTION, POWDER, FOR SOLUTION INTRAVENOUS EVERY 24 HOURS
Refills: 0 | Status: COMPLETED | OUTPATIENT
Start: 2024-01-01 | End: 2024-01-01

## 2024-01-01 RX ORDER — ROPIVACAINE IN 0.9% SOD CHL/PF 0.1 %
0.05 PLASTIC BAG, INJECTION (ML) EPIDURAL
Qty: 8 | Refills: 0 | Status: DISCONTINUED | OUTPATIENT
Start: 2024-01-01 | End: 2024-01-01

## 2024-01-01 RX ORDER — MIDODRINE HYDROCHLORIDE 5 MG/1
5 TABLET ORAL ONCE
Refills: 0 | Status: COMPLETED | OUTPATIENT
Start: 2024-01-01 | End: 2024-01-01

## 2024-01-01 RX ORDER — POTASSIUM CHLORIDE 10 MEQ
40 TABLET, EXT RELEASE, PARTICLES/CRYSTALS ORAL ONCE
Refills: 0 | Status: COMPLETED | OUTPATIENT
Start: 2024-01-01 | End: 2024-01-01

## 2024-01-01 RX ORDER — ROPIVACAINE IN 0.9% SOD CHL/PF 0.1 %
0.07 PLASTIC BAG, INJECTION (ML) EPIDURAL
Qty: 8 | Refills: 0 | Status: DISCONTINUED | OUTPATIENT
Start: 2024-01-01 | End: 2024-01-01

## 2024-01-01 RX ORDER — ACETAMINOPHEN 325 MG/1
650 TABLET ORAL EVERY 6 HOURS
Refills: 0 | Status: DISCONTINUED | OUTPATIENT
Start: 2024-01-01 | End: 2024-01-01

## 2024-01-01 RX ORDER — SODIUM CHLORIDE 9 MG/ML
500 INJECTION INTRAMUSCULAR; INTRAVENOUS; SUBCUTANEOUS ONCE
Refills: 0 | Status: DISCONTINUED | OUTPATIENT
Start: 2024-01-01 | End: 2024-01-01

## 2024-01-01 RX ORDER — DEXAMETHASONE 0.75 MG
6 TABLET ORAL DAILY
Refills: 0 | Status: COMPLETED | OUTPATIENT
Start: 2024-01-01 | End: 2024-01-01

## 2024-01-01 RX ORDER — POTASSIUM CHLORIDE 10 MEQ
20 TABLET, EXT RELEASE, PARTICLES/CRYSTALS ORAL ONCE
Refills: 0 | Status: COMPLETED | OUTPATIENT
Start: 2024-01-01 | End: 2024-01-01

## 2024-01-01 RX ORDER — SODIUM PHOSPHATE, MONOBASIC, MONOHYDRATE AND SODIUM PHOSPHATE, DIBASIC ANHYDROUS 276; 142 MG/ML; MG/ML
15 INJECTION, SOLUTION INTRAVENOUS ONCE
Refills: 0 | Status: COMPLETED | OUTPATIENT
Start: 2024-01-01 | End: 2024-01-01

## 2024-01-01 RX ORDER — CEFEPIME 2 G/1
1000 INJECTION, POWDER, FOR SOLUTION INTRAVENOUS EVERY 24 HOURS
Refills: 0 | Status: DISCONTINUED | OUTPATIENT
Start: 2024-01-01 | End: 2024-01-01

## 2024-01-01 RX ORDER — FUROSEMIDE 40 MG
20 TABLET ORAL ONCE
Refills: 0 | Status: COMPLETED | OUTPATIENT
Start: 2024-01-01 | End: 2024-01-01

## 2024-01-01 RX ORDER — ACETAMINOPHEN 325 MG/1
650 TABLET ORAL ONCE
Refills: 0 | Status: DISCONTINUED | OUTPATIENT
Start: 2024-01-01 | End: 2024-01-01

## 2024-01-01 RX ORDER — BACLOFEN 0.5 MG/ML
5 INJECTION INTRATHECAL EVERY 8 HOURS
Refills: 0 | Status: COMPLETED | OUTPATIENT
Start: 2024-01-01 | End: 2024-01-01

## 2024-01-01 RX ORDER — VANCOMYCIN/0.9 % SOD CHLORIDE 1.75G/25
750 PLASTIC BAG, INJECTION (ML) INTRAVENOUS EVERY 24 HOURS
Refills: 0 | Status: DISCONTINUED | OUTPATIENT
Start: 2024-01-01 | End: 2024-01-01

## 2024-01-01 RX ORDER — REMDESIVIR 5 MG/ML
200 INJECTION INTRAVENOUS EVERY 24 HOURS
Refills: 0 | Status: COMPLETED | OUTPATIENT
Start: 2024-01-01 | End: 2024-01-01

## 2024-01-01 RX ORDER — NYSTATIN 100000/G
1 CREAM (GRAM) TOPICAL THREE TIMES A DAY
Refills: 0 | Status: DISCONTINUED | OUTPATIENT
Start: 2024-01-01 | End: 2024-01-01

## 2024-01-01 RX ORDER — CHLORHEXIDINE GLUCONATE 40 MG/ML
1 SOLUTION TOPICAL
Refills: 0 | Status: DISCONTINUED | OUTPATIENT
Start: 2024-01-01 | End: 2024-01-01

## 2024-01-01 RX ORDER — SENNA 187 MG
2 TABLET ORAL AT BEDTIME
Refills: 0 | Status: DISCONTINUED | OUTPATIENT
Start: 2024-01-01 | End: 2024-01-01

## 2024-01-01 RX ORDER — CEFEPIME 2 G/1
2000 INJECTION, POWDER, FOR SOLUTION INTRAVENOUS ONCE
Refills: 0 | Status: COMPLETED | OUTPATIENT
Start: 2024-01-01 | End: 2024-01-01

## 2024-01-01 RX ORDER — PANTOPRAZOLE SODIUM 40 MG
40 TABLET, DELAYED RELEASE (ENTERIC COATED) ORAL DAILY
Refills: 0 | Status: COMPLETED | OUTPATIENT
Start: 2024-01-01 | End: 2024-01-01

## 2024-01-01 RX ORDER — HEPARIN SODIUM,BOVINE 1000/ML
5000 VIAL (ML) INJECTION EVERY 12 HOURS
Refills: 0 | Status: DISCONTINUED | OUTPATIENT
Start: 2024-01-01 | End: 2024-01-01

## 2024-01-01 RX ORDER — ROPIVACAINE IN 0.9% SOD CHL/PF 0.1 %
0.06 PLASTIC BAG, INJECTION (ML) EPIDURAL
Qty: 8 | Refills: 0 | Status: DISCONTINUED | OUTPATIENT
Start: 2024-01-01 | End: 2024-01-01

## 2024-01-01 RX ORDER — VANCOMYCIN/0.9 % SOD CHLORIDE 1.75G/25
1000 PLASTIC BAG, INJECTION (ML) INTRAVENOUS ONCE
Refills: 0 | Status: COMPLETED | OUTPATIENT
Start: 2024-01-01 | End: 2024-01-01

## 2024-01-01 RX ORDER — ACETYLCYSTEINE 200 MG/ML
4 VIAL (ML) MISCELLANEOUS EVERY 6 HOURS
Refills: 0 | Status: DISCONTINUED | OUTPATIENT
Start: 2024-01-01 | End: 2024-01-01

## 2024-01-01 RX ORDER — REMDESIVIR 5 MG/ML
100 INJECTION INTRAVENOUS EVERY 24 HOURS
Refills: 0 | Status: COMPLETED | OUTPATIENT
Start: 2024-01-01 | End: 2024-01-01

## 2024-01-01 RX ORDER — MIDODRINE HYDROCHLORIDE 5 MG/1
15 TABLET ORAL EVERY 8 HOURS
Refills: 0 | Status: DISCONTINUED | OUTPATIENT
Start: 2024-01-01 | End: 2024-01-01

## 2024-01-01 RX ORDER — SODIUM CHLORIDE 9 MG/ML
500 INJECTION INTRAMUSCULAR; INTRAVENOUS; SUBCUTANEOUS ONCE
Refills: 0 | Status: COMPLETED | OUTPATIENT
Start: 2024-01-01 | End: 2024-01-01

## 2024-01-01 RX ORDER — ROPIVACAINE IN 0.9% SOD CHL/PF 0.1 %
0.15 PLASTIC BAG, INJECTION (ML) EPIDURAL
Qty: 8 | Refills: 0 | Status: DISCONTINUED | OUTPATIENT
Start: 2024-01-01 | End: 2024-01-01

## 2024-01-01 RX ORDER — REMDESIVIR 5 MG/ML
INJECTION INTRAVENOUS
Refills: 0 | Status: COMPLETED | OUTPATIENT
Start: 2024-01-01 | End: 2024-01-01

## 2024-01-01 RX ORDER — MIDODRINE HYDROCHLORIDE 5 MG/1
5 TABLET ORAL EVERY 8 HOURS
Refills: 0 | Status: DISCONTINUED | OUTPATIENT
Start: 2024-01-01 | End: 2024-01-01

## 2024-01-01 RX ORDER — IPRATROPIUM BROMIDE AND ALBUTEROL SULFATE .5; 3 MG/3ML; MG/3ML
3 SOLUTION RESPIRATORY (INHALATION) EVERY 6 HOURS
Refills: 0 | Status: DISCONTINUED | OUTPATIENT
Start: 2024-01-01 | End: 2024-01-01

## 2024-01-01 RX ADMIN — IPRATROPIUM BROMIDE AND ALBUTEROL SULFATE 3 MILLILITER(S): .5; 3 SOLUTION RESPIRATORY (INHALATION) at 09:18

## 2024-01-01 RX ADMIN — MIDODRINE HYDROCHLORIDE 5 MILLIGRAM(S): 5 TABLET ORAL at 11:21

## 2024-01-01 RX ADMIN — Medication 100 MILLILITER(S): at 18:04

## 2024-01-01 RX ADMIN — Medication 50 MILLILITER(S): at 12:16

## 2024-01-01 RX ADMIN — Medication 40 MILLIGRAM(S): at 12:06

## 2024-01-01 RX ADMIN — BACLOFEN 5 MILLIGRAM(S): 0.5 INJECTION INTRATHECAL at 05:37

## 2024-01-01 RX ADMIN — Medication 4 MILLILITER(S): at 19:44

## 2024-01-01 RX ADMIN — Medication 5000 UNIT(S): at 18:04

## 2024-01-01 RX ADMIN — BACLOFEN 5 MILLIGRAM(S): 0.5 INJECTION INTRATHECAL at 13:51

## 2024-01-01 RX ADMIN — Medication 1 APPLICATION(S): at 21:25

## 2024-01-01 RX ADMIN — POLYETHYLENE GLYCOL 3350 17 GRAM(S): 17 POWDER, FOR SOLUTION ORAL at 17:57

## 2024-01-01 RX ADMIN — MIDODRINE HYDROCHLORIDE 5 MILLIGRAM(S): 5 TABLET ORAL at 05:21

## 2024-01-01 RX ADMIN — IPRATROPIUM BROMIDE AND ALBUTEROL SULFATE 3 MILLILITER(S): .5; 3 SOLUTION RESPIRATORY (INHALATION) at 08:02

## 2024-01-01 RX ADMIN — CEFEPIME 100 MILLIGRAM(S): 2 INJECTION, POWDER, FOR SOLUTION INTRAVENOUS at 13:25

## 2024-01-01 RX ADMIN — Medication 2 TABLET(S): at 22:13

## 2024-01-01 RX ADMIN — IPRATROPIUM BROMIDE AND ALBUTEROL SULFATE 3 MILLILITER(S): .5; 3 SOLUTION RESPIRATORY (INHALATION) at 14:55

## 2024-01-01 RX ADMIN — Medication 1 APPLICATION(S): at 18:14

## 2024-01-01 RX ADMIN — Medication 100 MILLILITER(S): at 11:43

## 2024-01-01 RX ADMIN — MIDODRINE HYDROCHLORIDE 5 MILLIGRAM(S): 5 TABLET ORAL at 05:32

## 2024-01-01 RX ADMIN — CHLORHEXIDINE GLUCONATE 1 APPLICATION(S): 40 SOLUTION TOPICAL at 05:57

## 2024-01-01 RX ADMIN — MIDODRINE HYDROCHLORIDE 15 MILLIGRAM(S): 5 TABLET ORAL at 05:17

## 2024-01-01 RX ADMIN — MIDODRINE HYDROCHLORIDE 5 MILLIGRAM(S): 5 TABLET ORAL at 14:23

## 2024-01-01 RX ADMIN — IPRATROPIUM BROMIDE AND ALBUTEROL SULFATE 3 MILLILITER(S): .5; 3 SOLUTION RESPIRATORY (INHALATION) at 19:27

## 2024-01-01 RX ADMIN — IPRATROPIUM BROMIDE AND ALBUTEROL SULFATE 3 MILLILITER(S): .5; 3 SOLUTION RESPIRATORY (INHALATION) at 20:04

## 2024-01-01 RX ADMIN — REMDESIVIR 200 MILLIGRAM(S): 5 INJECTION INTRAVENOUS at 13:13

## 2024-01-01 RX ADMIN — MIDODRINE HYDROCHLORIDE 10 MILLIGRAM(S): 5 TABLET ORAL at 06:13

## 2024-01-01 RX ADMIN — IPRATROPIUM BROMIDE AND ALBUTEROL SULFATE 3 MILLILITER(S): .5; 3 SOLUTION RESPIRATORY (INHALATION) at 08:05

## 2024-01-01 RX ADMIN — IPRATROPIUM BROMIDE AND ALBUTEROL SULFATE 3 MILLILITER(S): .5; 3 SOLUTION RESPIRATORY (INHALATION) at 08:54

## 2024-01-01 RX ADMIN — IPRATROPIUM BROMIDE AND ALBUTEROL SULFATE 3 MILLILITER(S): .5; 3 SOLUTION RESPIRATORY (INHALATION) at 20:02

## 2024-01-01 RX ADMIN — IPRATROPIUM BROMIDE AND ALBUTEROL SULFATE 3 MILLILITER(S): .5; 3 SOLUTION RESPIRATORY (INHALATION) at 19:15

## 2024-01-01 RX ADMIN — Medication 1 APPLICATION(S): at 17:35

## 2024-01-01 RX ADMIN — Medication 1 APPLICATION(S): at 13:50

## 2024-01-01 RX ADMIN — Medication 1 APPLICATION(S): at 17:10

## 2024-01-01 RX ADMIN — Medication 4 MILLILITER(S): at 19:36

## 2024-01-01 RX ADMIN — IPRATROPIUM BROMIDE AND ALBUTEROL SULFATE 3 MILLILITER(S): .5; 3 SOLUTION RESPIRATORY (INHALATION) at 13:55

## 2024-01-01 RX ADMIN — ACETAMINOPHEN 650 MILLIGRAM(S): 325 TABLET ORAL at 04:45

## 2024-01-01 RX ADMIN — Medication 6 MILLIGRAM(S): at 05:53

## 2024-01-01 RX ADMIN — CHLORHEXIDINE GLUCONATE 1 APPLICATION(S): 40 SOLUTION TOPICAL at 04:45

## 2024-01-01 RX ADMIN — SODIUM CHLORIDE 500 MILLILITER(S): 9 INJECTION INTRAMUSCULAR; INTRAVENOUS; SUBCUTANEOUS at 05:04

## 2024-01-01 RX ADMIN — Medication 5000 UNIT(S): at 17:33

## 2024-01-01 RX ADMIN — IPRATROPIUM BROMIDE AND ALBUTEROL SULFATE 3 MILLILITER(S): .5; 3 SOLUTION RESPIRATORY (INHALATION) at 07:59

## 2024-01-01 RX ADMIN — Medication 1 APPLICATION(S): at 05:23

## 2024-01-01 RX ADMIN — IPRATROPIUM BROMIDE AND ALBUTEROL SULFATE 3 MILLILITER(S): .5; 3 SOLUTION RESPIRATORY (INHALATION) at 19:59

## 2024-01-01 RX ADMIN — Medication 75 MILLILITER(S): at 15:17

## 2024-01-01 RX ADMIN — Medication 1 APPLICATION(S): at 17:42

## 2024-01-01 RX ADMIN — Medication 1000 MILLILITER(S): at 10:25

## 2024-01-01 RX ADMIN — Medication 1 APPLICATION(S): at 06:58

## 2024-01-01 RX ADMIN — Medication 100 MILLILITER(S): at 19:34

## 2024-01-01 RX ADMIN — Medication 1 APPLICATION(S): at 21:12

## 2024-01-01 RX ADMIN — MIDODRINE HYDROCHLORIDE 5 MILLIGRAM(S): 5 TABLET ORAL at 14:34

## 2024-01-01 RX ADMIN — Medication 1 APPLICATION(S): at 06:00

## 2024-01-01 RX ADMIN — POLYETHYLENE GLYCOL 3350 17 GRAM(S): 17 POWDER, FOR SOLUTION ORAL at 17:33

## 2024-01-01 RX ADMIN — Medication 5000 UNIT(S): at 17:11

## 2024-01-01 RX ADMIN — MIDODRINE HYDROCHLORIDE 10 MILLIGRAM(S): 5 TABLET ORAL at 22:13

## 2024-01-01 RX ADMIN — Medication 5000 UNIT(S): at 05:38

## 2024-01-01 RX ADMIN — IPRATROPIUM BROMIDE AND ALBUTEROL SULFATE 3 MILLILITER(S): .5; 3 SOLUTION RESPIRATORY (INHALATION) at 08:32

## 2024-01-01 RX ADMIN — IPRATROPIUM BROMIDE AND ALBUTEROL SULFATE 3 MILLILITER(S): .5; 3 SOLUTION RESPIRATORY (INHALATION) at 14:58

## 2024-01-01 RX ADMIN — IPRATROPIUM BROMIDE AND ALBUTEROL SULFATE 3 MILLILITER(S): .5; 3 SOLUTION RESPIRATORY (INHALATION) at 08:30

## 2024-01-01 RX ADMIN — Medication 1 APPLICATION(S): at 17:51

## 2024-01-01 RX ADMIN — Medication 1 APPLICATION(S): at 06:46

## 2024-01-01 RX ADMIN — Medication 3.4 MICROGRAM(S)/KG/MIN: at 21:46

## 2024-01-01 RX ADMIN — IPRATROPIUM BROMIDE AND ALBUTEROL SULFATE 3 MILLILITER(S): .5; 3 SOLUTION RESPIRATORY (INHALATION) at 19:36

## 2024-01-01 RX ADMIN — Medication 6 MILLIGRAM(S): at 05:22

## 2024-01-01 RX ADMIN — IPRATROPIUM BROMIDE AND ALBUTEROL SULFATE 3 MILLILITER(S): .5; 3 SOLUTION RESPIRATORY (INHALATION) at 20:20

## 2024-01-01 RX ADMIN — Medication 1 APPLICATION(S): at 06:01

## 2024-01-01 RX ADMIN — Medication 6 MILLIGRAM(S): at 05:11

## 2024-01-01 RX ADMIN — Medication 1 APPLICATION(S): at 05:04

## 2024-01-01 RX ADMIN — MIDODRINE HYDROCHLORIDE 10 MILLIGRAM(S): 5 TABLET ORAL at 14:14

## 2024-01-01 RX ADMIN — Medication 1 APPLICATION(S): at 18:10

## 2024-01-01 RX ADMIN — Medication 5000 UNIT(S): at 05:52

## 2024-01-01 RX ADMIN — Medication 100 MILLILITER(S): at 17:27

## 2024-01-01 RX ADMIN — Medication 1 APPLICATION(S): at 21:20

## 2024-01-01 RX ADMIN — Medication 50 MILLIEQUIVALENT(S): at 17:27

## 2024-01-01 RX ADMIN — Medication 4 MILLILITER(S): at 09:42

## 2024-01-01 RX ADMIN — IPRATROPIUM BROMIDE AND ALBUTEROL SULFATE 3 MILLILITER(S): .5; 3 SOLUTION RESPIRATORY (INHALATION) at 20:44

## 2024-01-01 RX ADMIN — IPRATROPIUM BROMIDE AND ALBUTEROL SULFATE 3 MILLILITER(S): .5; 3 SOLUTION RESPIRATORY (INHALATION) at 01:04

## 2024-01-01 RX ADMIN — CHLORHEXIDINE GLUCONATE 1 APPLICATION(S): 40 SOLUTION TOPICAL at 06:13

## 2024-01-01 RX ADMIN — Medication 500 MILLILITER(S): at 22:40

## 2024-01-01 RX ADMIN — IPRATROPIUM BROMIDE AND ALBUTEROL SULFATE 3 MILLILITER(S): .5; 3 SOLUTION RESPIRATORY (INHALATION) at 14:16

## 2024-01-01 RX ADMIN — CHLORHEXIDINE GLUCONATE 1 APPLICATION(S): 40 SOLUTION TOPICAL at 05:18

## 2024-01-01 RX ADMIN — CHLORHEXIDINE GLUCONATE 1 APPLICATION(S): 40 SOLUTION TOPICAL at 05:17

## 2024-01-01 RX ADMIN — Medication 5000 UNIT(S): at 17:19

## 2024-01-01 RX ADMIN — Medication 3.4 MICROGRAM(S)/KG/MIN: at 12:27

## 2024-01-01 RX ADMIN — IPRATROPIUM BROMIDE AND ALBUTEROL SULFATE 3 MILLILITER(S): .5; 3 SOLUTION RESPIRATORY (INHALATION) at 07:50

## 2024-01-01 RX ADMIN — Medication 5000 UNIT(S): at 17:24

## 2024-01-01 RX ADMIN — Medication 1 APPLICATION(S): at 05:13

## 2024-01-01 RX ADMIN — MIDODRINE HYDROCHLORIDE 15 MILLIGRAM(S): 5 TABLET ORAL at 22:03

## 2024-01-01 RX ADMIN — Medication 1 APPLICATION(S): at 05:52

## 2024-01-01 RX ADMIN — MIDODRINE HYDROCHLORIDE 15 MILLIGRAM(S): 5 TABLET ORAL at 05:06

## 2024-01-01 RX ADMIN — MIDODRINE HYDROCHLORIDE 5 MILLIGRAM(S): 5 TABLET ORAL at 04:45

## 2024-01-01 RX ADMIN — Medication 1 APPLICATION(S): at 14:34

## 2024-01-01 RX ADMIN — ETHACRYNIC ACID 25 MILLIGRAM(S): 25 TABLET ORAL at 15:07

## 2024-01-01 RX ADMIN — BACLOFEN 5 MILLIGRAM(S): 0.5 INJECTION INTRATHECAL at 13:02

## 2024-01-01 RX ADMIN — IPRATROPIUM BROMIDE AND ALBUTEROL SULFATE 3 MILLILITER(S): .5; 3 SOLUTION RESPIRATORY (INHALATION) at 13:56

## 2024-01-01 RX ADMIN — MIDODRINE HYDROCHLORIDE 15 MILLIGRAM(S): 5 TABLET ORAL at 14:00

## 2024-01-01 RX ADMIN — Medication 6 MILLIGRAM(S): at 05:40

## 2024-01-01 RX ADMIN — Medication 1 APPLICATION(S): at 06:12

## 2024-01-01 RX ADMIN — Medication 5000 UNIT(S): at 06:13

## 2024-01-01 RX ADMIN — Medication 1 APPLICATION(S): at 05:11

## 2024-01-01 RX ADMIN — Medication 6 MILLIGRAM(S): at 05:28

## 2024-01-01 RX ADMIN — POLYETHYLENE GLYCOL 3350 17 GRAM(S): 17 POWDER, FOR SOLUTION ORAL at 05:30

## 2024-01-01 RX ADMIN — Medication 1 APPLICATION(S): at 14:35

## 2024-01-01 RX ADMIN — IPRATROPIUM BROMIDE AND ALBUTEROL SULFATE 3 MILLILITER(S): .5; 3 SOLUTION RESPIRATORY (INHALATION) at 19:54

## 2024-01-01 RX ADMIN — IPRATROPIUM BROMIDE AND ALBUTEROL SULFATE 3 MILLILITER(S): .5; 3 SOLUTION RESPIRATORY (INHALATION) at 09:41

## 2024-01-01 RX ADMIN — CHLORHEXIDINE GLUCONATE 1 APPLICATION(S): 40 SOLUTION TOPICAL at 05:24

## 2024-01-01 RX ADMIN — MIDODRINE HYDROCHLORIDE 5 MILLIGRAM(S): 5 TABLET ORAL at 21:30

## 2024-01-01 RX ADMIN — Medication 4 MILLILITER(S): at 08:40

## 2024-01-01 RX ADMIN — Medication 1 APPLICATION(S): at 05:22

## 2024-01-01 RX ADMIN — Medication 40 MILLIGRAM(S): at 11:32

## 2024-01-01 RX ADMIN — Medication 50 MILLIEQUIVALENT(S): at 17:28

## 2024-01-01 RX ADMIN — ACETAMINOPHEN 650 MILLIGRAM(S): 325 TABLET ORAL at 04:44

## 2024-01-01 RX ADMIN — IPRATROPIUM BROMIDE AND ALBUTEROL SULFATE 3 MILLILITER(S): .5; 3 SOLUTION RESPIRATORY (INHALATION) at 07:57

## 2024-01-01 RX ADMIN — IPRATROPIUM BROMIDE AND ALBUTEROL SULFATE 3 MILLILITER(S): .5; 3 SOLUTION RESPIRATORY (INHALATION) at 05:48

## 2024-01-01 RX ADMIN — Medication 5000 UNIT(S): at 05:28

## 2024-01-01 RX ADMIN — CEFEPIME 100 MILLIGRAM(S): 2 INJECTION, POWDER, FOR SOLUTION INTRAVENOUS at 13:49

## 2024-01-01 RX ADMIN — Medication 75 MILLILITER(S): at 07:32

## 2024-01-01 RX ADMIN — POLYETHYLENE GLYCOL 3350 17 GRAM(S): 17 POWDER, FOR SOLUTION ORAL at 05:26

## 2024-01-01 RX ADMIN — Medication 5000 UNIT(S): at 18:12

## 2024-01-01 RX ADMIN — CHLORHEXIDINE GLUCONATE 1 APPLICATION(S): 40 SOLUTION TOPICAL at 05:06

## 2024-01-01 RX ADMIN — MIDODRINE HYDROCHLORIDE 15 MILLIGRAM(S): 5 TABLET ORAL at 13:06

## 2024-01-01 RX ADMIN — Medication 1 APPLICATION(S): at 05:53

## 2024-01-01 RX ADMIN — Medication 4.76 MICROGRAM(S)/KG/MIN: at 18:55

## 2024-01-01 RX ADMIN — Medication 4 MILLILITER(S): at 20:31

## 2024-01-01 RX ADMIN — IPRATROPIUM BROMIDE AND ALBUTEROL SULFATE 3 MILLILITER(S): .5; 3 SOLUTION RESPIRATORY (INHALATION) at 07:38

## 2024-01-01 RX ADMIN — CHLORHEXIDINE GLUCONATE 1 APPLICATION(S): 40 SOLUTION TOPICAL at 06:01

## 2024-01-01 RX ADMIN — Medication 250 MILLIGRAM(S): at 05:40

## 2024-01-01 RX ADMIN — IPRATROPIUM BROMIDE AND ALBUTEROL SULFATE 3 MILLILITER(S): .5; 3 SOLUTION RESPIRATORY (INHALATION) at 08:40

## 2024-01-01 RX ADMIN — Medication 2 TABLET(S): at 22:03

## 2024-01-01 RX ADMIN — Medication 3.4 MICROGRAM(S)/KG/MIN: at 20:12

## 2024-01-01 RX ADMIN — Medication 5000 UNIT(S): at 17:51

## 2024-01-01 RX ADMIN — CHLORHEXIDINE GLUCONATE 1 APPLICATION(S): 40 SOLUTION TOPICAL at 05:26

## 2024-01-01 RX ADMIN — IPRATROPIUM BROMIDE AND ALBUTEROL SULFATE 3 MILLILITER(S): .5; 3 SOLUTION RESPIRATORY (INHALATION) at 19:50

## 2024-01-01 RX ADMIN — Medication 1000 MILLILITER(S): at 11:33

## 2024-01-01 RX ADMIN — Medication 3.4 MICROGRAM(S)/KG/MIN: at 03:26

## 2024-01-01 RX ADMIN — IPRATROPIUM BROMIDE AND ALBUTEROL SULFATE 3 MILLILITER(S): .5; 3 SOLUTION RESPIRATORY (INHALATION) at 01:38

## 2024-01-01 RX ADMIN — Medication 75 MILLILITER(S): at 04:08

## 2024-01-01 RX ADMIN — Medication 6 MILLIGRAM(S): at 05:00

## 2024-01-01 RX ADMIN — IPRATROPIUM BROMIDE AND ALBUTEROL SULFATE 3 MILLILITER(S): .5; 3 SOLUTION RESPIRATORY (INHALATION) at 14:14

## 2024-01-01 RX ADMIN — Medication 10.2 MICROGRAM(S)/KG/MIN: at 23:32

## 2024-01-01 RX ADMIN — MIDODRINE HYDROCHLORIDE 15 MILLIGRAM(S): 5 TABLET ORAL at 05:26

## 2024-01-01 RX ADMIN — IPRATROPIUM BROMIDE AND ALBUTEROL SULFATE 3 MILLILITER(S): .5; 3 SOLUTION RESPIRATORY (INHALATION) at 14:36

## 2024-01-01 RX ADMIN — POLYETHYLENE GLYCOL 3350 17 GRAM(S): 17 POWDER, FOR SOLUTION ORAL at 05:12

## 2024-01-01 RX ADMIN — Medication 1 APPLICATION(S): at 17:24

## 2024-01-01 RX ADMIN — IPRATROPIUM BROMIDE AND ALBUTEROL SULFATE 3 MILLILITER(S): .5; 3 SOLUTION RESPIRATORY (INHALATION) at 20:32

## 2024-01-01 RX ADMIN — CEFEPIME 100 MILLIGRAM(S): 2 INJECTION, POWDER, FOR SOLUTION INTRAVENOUS at 15:17

## 2024-01-01 RX ADMIN — Medication 1 APPLICATION(S): at 21:30

## 2024-01-01 RX ADMIN — IPRATROPIUM BROMIDE AND ALBUTEROL SULFATE 3 MILLILITER(S): .5; 3 SOLUTION RESPIRATORY (INHALATION) at 07:26

## 2024-01-01 RX ADMIN — IPRATROPIUM BROMIDE AND ALBUTEROL SULFATE 3 MILLILITER(S): .5; 3 SOLUTION RESPIRATORY (INHALATION) at 19:09

## 2024-01-01 RX ADMIN — MIDODRINE HYDROCHLORIDE 10 MILLIGRAM(S): 5 TABLET ORAL at 22:11

## 2024-01-01 RX ADMIN — Medication 1 APPLICATION(S): at 14:23

## 2024-01-01 RX ADMIN — Medication 6 MILLIGRAM(S): at 06:12

## 2024-01-01 RX ADMIN — IPRATROPIUM BROMIDE AND ALBUTEROL SULFATE 3 MILLILITER(S): .5; 3 SOLUTION RESPIRATORY (INHALATION) at 15:01

## 2024-01-01 RX ADMIN — Medication 5000 UNIT(S): at 18:00

## 2024-01-01 RX ADMIN — Medication 5000 UNIT(S): at 17:02

## 2024-01-01 RX ADMIN — Medication 5000 UNIT(S): at 05:21

## 2024-01-01 RX ADMIN — CHLORHEXIDINE GLUCONATE 1 APPLICATION(S): 40 SOLUTION TOPICAL at 05:51

## 2024-01-01 RX ADMIN — Medication 1 APPLICATION(S): at 04:43

## 2024-01-01 RX ADMIN — Medication 1 APPLICATION(S): at 05:42

## 2024-01-01 RX ADMIN — IPRATROPIUM BROMIDE AND ALBUTEROL SULFATE 3 MILLILITER(S): .5; 3 SOLUTION RESPIRATORY (INHALATION) at 19:45

## 2024-01-01 RX ADMIN — Medication 1 APPLICATION(S): at 21:14

## 2024-01-01 RX ADMIN — REMDESIVIR 200 MILLIGRAM(S): 5 INJECTION INTRAVENOUS at 11:33

## 2024-01-01 RX ADMIN — Medication 10.2 MICROGRAM(S)/KG/MIN: at 22:13

## 2024-01-01 RX ADMIN — Medication 5000 UNIT(S): at 17:25

## 2024-01-01 RX ADMIN — Medication 40 MILLIEQUIVALENT(S): at 11:57

## 2024-01-01 RX ADMIN — IPRATROPIUM BROMIDE AND ALBUTEROL SULFATE 3 MILLILITER(S): .5; 3 SOLUTION RESPIRATORY (INHALATION) at 08:53

## 2024-01-01 RX ADMIN — IPRATROPIUM BROMIDE AND ALBUTEROL SULFATE 3 MILLILITER(S): .5; 3 SOLUTION RESPIRATORY (INHALATION) at 20:35

## 2024-01-01 RX ADMIN — MIDODRINE HYDROCHLORIDE 15 MILLIGRAM(S): 5 TABLET ORAL at 21:26

## 2024-01-01 RX ADMIN — IPRATROPIUM BROMIDE AND ALBUTEROL SULFATE 3 MILLILITER(S): .5; 3 SOLUTION RESPIRATORY (INHALATION) at 07:33

## 2024-01-01 RX ADMIN — MIDODRINE HYDROCHLORIDE 15 MILLIGRAM(S): 5 TABLET ORAL at 05:21

## 2024-01-01 RX ADMIN — BACLOFEN 5 MILLIGRAM(S): 0.5 INJECTION INTRATHECAL at 05:02

## 2024-01-01 RX ADMIN — Medication 1 APPLICATION(S): at 22:21

## 2024-01-01 RX ADMIN — MIDODRINE HYDROCHLORIDE 15 MILLIGRAM(S): 5 TABLET ORAL at 21:42

## 2024-01-01 RX ADMIN — Medication 1 APPLICATION(S): at 17:27

## 2024-01-01 RX ADMIN — Medication 2 TABLET(S): at 21:42

## 2024-01-01 RX ADMIN — IPRATROPIUM BROMIDE AND ALBUTEROL SULFATE 3 MILLILITER(S): .5; 3 SOLUTION RESPIRATORY (INHALATION) at 13:50

## 2024-01-01 RX ADMIN — Medication 6 MILLIGRAM(S): at 12:06

## 2024-01-01 RX ADMIN — Medication 5000 UNIT(S): at 05:12

## 2024-01-01 RX ADMIN — IPRATROPIUM BROMIDE AND ALBUTEROL SULFATE 3 MILLILITER(S): .5; 3 SOLUTION RESPIRATORY (INHALATION) at 13:49

## 2024-01-01 RX ADMIN — Medication 2 TABLET(S): at 21:31

## 2024-01-01 RX ADMIN — CEFEPIME 100 MILLIGRAM(S): 2 INJECTION, POWDER, FOR SOLUTION INTRAVENOUS at 11:33

## 2024-01-01 RX ADMIN — IPRATROPIUM BROMIDE AND ALBUTEROL SULFATE 3 MILLILITER(S): .5; 3 SOLUTION RESPIRATORY (INHALATION) at 14:31

## 2024-01-01 RX ADMIN — Medication 5000 UNIT(S): at 05:17

## 2024-01-01 RX ADMIN — CEFEPIME 100 MILLIGRAM(S): 2 INJECTION, POWDER, FOR SOLUTION INTRAVENOUS at 13:09

## 2024-01-01 RX ADMIN — CHLORHEXIDINE GLUCONATE 1 APPLICATION(S): 40 SOLUTION TOPICAL at 05:31

## 2024-01-01 RX ADMIN — IPRATROPIUM BROMIDE AND ALBUTEROL SULFATE 3 MILLILITER(S): .5; 3 SOLUTION RESPIRATORY (INHALATION) at 20:30

## 2024-01-01 RX ADMIN — CHLORHEXIDINE GLUCONATE 1 APPLICATION(S): 40 SOLUTION TOPICAL at 05:41

## 2024-01-01 RX ADMIN — MIDODRINE HYDROCHLORIDE 10 MILLIGRAM(S): 5 TABLET ORAL at 05:12

## 2024-01-01 RX ADMIN — Medication 100 MILLILITER(S): at 21:49

## 2024-01-01 RX ADMIN — IPRATROPIUM BROMIDE AND ALBUTEROL SULFATE 3 MILLILITER(S): .5; 3 SOLUTION RESPIRATORY (INHALATION) at 20:34

## 2024-01-01 RX ADMIN — Medication 1 APPLICATION(S): at 20:12

## 2024-01-01 RX ADMIN — MIDODRINE HYDROCHLORIDE 15 MILLIGRAM(S): 5 TABLET ORAL at 13:32

## 2024-01-01 RX ADMIN — MIDODRINE HYDROCHLORIDE 15 MILLIGRAM(S): 5 TABLET ORAL at 21:23

## 2024-01-01 RX ADMIN — CEFEPIME 100 MILLIGRAM(S): 2 INJECTION, POWDER, FOR SOLUTION INTRAVENOUS at 13:13

## 2024-01-01 RX ADMIN — Medication 5000 UNIT(S): at 05:10

## 2024-01-01 RX ADMIN — IPRATROPIUM BROMIDE AND ALBUTEROL SULFATE 3 MILLILITER(S): .5; 3 SOLUTION RESPIRATORY (INHALATION) at 14:27

## 2024-01-01 RX ADMIN — Medication 1 APPLICATION(S): at 05:41

## 2024-01-01 RX ADMIN — Medication 40 MILLIGRAM(S): at 13:13

## 2024-01-01 RX ADMIN — Medication 1 APPLICATION(S): at 14:26

## 2024-01-01 RX ADMIN — Medication 5000 UNIT(S): at 05:31

## 2024-01-01 RX ADMIN — Medication 1 APPLICATION(S): at 05:32

## 2024-01-01 RX ADMIN — MIDODRINE HYDROCHLORIDE 5 MILLIGRAM(S): 5 TABLET ORAL at 05:53

## 2024-01-01 RX ADMIN — Medication 6 MILLIGRAM(S): at 06:09

## 2024-01-01 RX ADMIN — CEFEPIME 100 MILLIGRAM(S): 2 INJECTION, POWDER, FOR SOLUTION INTRAVENOUS at 06:11

## 2024-01-01 RX ADMIN — MIDODRINE HYDROCHLORIDE 5 MILLIGRAM(S): 5 TABLET ORAL at 13:50

## 2024-01-01 RX ADMIN — Medication 50 MILLIEQUIVALENT(S): at 00:00

## 2024-01-01 RX ADMIN — CEFEPIME 100 MILLIGRAM(S): 2 INJECTION, POWDER, FOR SOLUTION INTRAVENOUS at 14:13

## 2024-01-01 RX ADMIN — Medication 1 APPLICATION(S): at 17:02

## 2024-01-01 RX ADMIN — MIDODRINE HYDROCHLORIDE 10 MILLIGRAM(S): 5 TABLET ORAL at 21:31

## 2024-01-01 RX ADMIN — CHLORHEXIDINE GLUCONATE 1 APPLICATION(S): 40 SOLUTION TOPICAL at 05:39

## 2024-01-01 RX ADMIN — CHLORHEXIDINE GLUCONATE 1 APPLICATION(S): 40 SOLUTION TOPICAL at 05:01

## 2024-01-01 RX ADMIN — Medication 1 APPLICATION(S): at 05:05

## 2024-01-01 RX ADMIN — Medication 1000 MILLILITER(S): at 13:28

## 2024-01-01 RX ADMIN — Medication 1 APPLICATION(S): at 13:42

## 2024-01-01 RX ADMIN — Medication 5000 UNIT(S): at 17:36

## 2024-01-01 RX ADMIN — Medication 10.2 MICROGRAM(S)/KG/MIN: at 11:33

## 2024-01-01 RX ADMIN — Medication 1 APPLICATION(S): at 05:03

## 2024-01-01 RX ADMIN — Medication 5000 UNIT(S): at 17:28

## 2024-01-01 RX ADMIN — Medication 100 MILLILITER(S): at 17:54

## 2024-01-01 RX ADMIN — Medication 1 APPLICATION(S): at 18:13

## 2024-01-01 RX ADMIN — Medication 40 MILLIGRAM(S): at 12:25

## 2024-01-01 RX ADMIN — Medication 5000 UNIT(S): at 05:34

## 2024-01-01 RX ADMIN — Medication 1 APPLICATION(S): at 13:59

## 2024-01-01 RX ADMIN — IPRATROPIUM BROMIDE AND ALBUTEROL SULFATE 3 MILLILITER(S): .5; 3 SOLUTION RESPIRATORY (INHALATION) at 16:03

## 2024-01-01 RX ADMIN — Medication 250 MILLIGRAM(S): at 09:46

## 2024-01-01 RX ADMIN — MIDODRINE HYDROCHLORIDE 10 MILLIGRAM(S): 5 TABLET ORAL at 13:43

## 2024-01-01 RX ADMIN — Medication 5000 UNIT(S): at 17:31

## 2024-01-01 RX ADMIN — ACETAMINOPHEN 650 MILLIGRAM(S): 325 TABLET ORAL at 23:19

## 2024-01-01 RX ADMIN — Medication 1 APPLICATION(S): at 17:30

## 2024-01-01 RX ADMIN — Medication 5000 UNIT(S): at 17:27

## 2024-01-01 RX ADMIN — Medication 1 APPLICATION(S): at 20:57

## 2024-01-01 RX ADMIN — Medication 1 APPLICATION(S): at 17:09

## 2024-01-01 RX ADMIN — Medication 4 MILLILITER(S): at 07:57

## 2024-01-01 RX ADMIN — Medication 1 APPLICATION(S): at 17:15

## 2024-01-01 RX ADMIN — Medication 6 MILLIGRAM(S): at 05:31

## 2024-01-01 RX ADMIN — MIDODRINE HYDROCHLORIDE 15 MILLIGRAM(S): 5 TABLET ORAL at 21:01

## 2024-01-01 RX ADMIN — Medication 1 APPLICATION(S): at 06:06

## 2024-01-01 RX ADMIN — BACLOFEN 5 MILLIGRAM(S): 0.5 INJECTION INTRATHECAL at 22:02

## 2024-01-01 RX ADMIN — IPRATROPIUM BROMIDE AND ALBUTEROL SULFATE 3 MILLILITER(S): .5; 3 SOLUTION RESPIRATORY (INHALATION) at 14:23

## 2024-01-01 RX ADMIN — MIDODRINE HYDROCHLORIDE 10 MILLIGRAM(S): 5 TABLET ORAL at 06:05

## 2024-01-01 RX ADMIN — IPRATROPIUM BROMIDE AND ALBUTEROL SULFATE 3 MILLILITER(S): .5; 3 SOLUTION RESPIRATORY (INHALATION) at 20:14

## 2024-01-01 RX ADMIN — Medication 6.81 MICROGRAM(S)/KG/MIN: at 21:49

## 2024-01-01 RX ADMIN — IPRATROPIUM BROMIDE AND ALBUTEROL SULFATE 3 MILLILITER(S): .5; 3 SOLUTION RESPIRATORY (INHALATION) at 14:06

## 2024-01-01 RX ADMIN — MIDODRINE HYDROCHLORIDE 10 MILLIGRAM(S): 5 TABLET ORAL at 14:33

## 2024-01-01 RX ADMIN — Medication 5000 UNIT(S): at 05:24

## 2024-01-01 RX ADMIN — Medication 1 APPLICATION(S): at 22:03

## 2024-01-01 RX ADMIN — Medication 25 GRAM(S): at 13:27

## 2024-01-01 RX ADMIN — CHLORHEXIDINE GLUCONATE 1 APPLICATION(S): 40 SOLUTION TOPICAL at 06:30

## 2024-01-01 RX ADMIN — Medication 1 APPLICATION(S): at 17:54

## 2024-01-01 RX ADMIN — BACLOFEN 5 MILLIGRAM(S): 0.5 INJECTION INTRATHECAL at 21:01

## 2024-01-01 RX ADMIN — Medication 1 APPLICATION(S): at 14:41

## 2024-01-01 RX ADMIN — Medication 1 APPLICATION(S): at 17:32

## 2024-01-01 RX ADMIN — Medication 1 APPLICATION(S): at 05:35

## 2024-01-01 RX ADMIN — Medication 10.2 MICROGRAM(S)/KG/MIN: at 19:35

## 2024-01-01 RX ADMIN — Medication 4 MILLILITER(S): at 01:40

## 2024-01-01 RX ADMIN — CHLORHEXIDINE GLUCONATE 1 APPLICATION(S): 40 SOLUTION TOPICAL at 05:28

## 2024-01-01 RX ADMIN — Medication 5000 UNIT(S): at 06:58

## 2024-01-01 RX ADMIN — MIDODRINE HYDROCHLORIDE 5 MILLIGRAM(S): 5 TABLET ORAL at 21:20

## 2024-01-01 RX ADMIN — Medication 5000 UNIT(S): at 06:06

## 2024-01-01 RX ADMIN — CHLORHEXIDINE GLUCONATE 1 APPLICATION(S): 40 SOLUTION TOPICAL at 06:07

## 2024-01-01 RX ADMIN — CEFEPIME 100 MILLIGRAM(S): 2 INJECTION, POWDER, FOR SOLUTION INTRAVENOUS at 13:42

## 2024-01-01 RX ADMIN — Medication 4 MILLILITER(S): at 14:35

## 2024-01-01 RX ADMIN — IPRATROPIUM BROMIDE AND ALBUTEROL SULFATE 3 MILLILITER(S): .5; 3 SOLUTION RESPIRATORY (INHALATION) at 08:01

## 2024-01-01 RX ADMIN — IPRATROPIUM BROMIDE AND ALBUTEROL SULFATE 3 MILLILITER(S): .5; 3 SOLUTION RESPIRATORY (INHALATION) at 08:25

## 2024-01-01 RX ADMIN — IPRATROPIUM BROMIDE AND ALBUTEROL SULFATE 3 MILLILITER(S): .5; 3 SOLUTION RESPIRATORY (INHALATION) at 14:19

## 2024-01-01 RX ADMIN — Medication 40 MILLIGRAM(S): at 11:43

## 2024-01-01 RX ADMIN — BACLOFEN 5 MILLIGRAM(S): 0.5 INJECTION INTRATHECAL at 21:26

## 2024-01-01 RX ADMIN — Medication 10.2 MICROGRAM(S)/KG/MIN: at 06:14

## 2024-01-01 RX ADMIN — MIDODRINE HYDROCHLORIDE 5 MILLIGRAM(S): 5 TABLET ORAL at 10:44

## 2024-01-01 RX ADMIN — POLYETHYLENE GLYCOL 3350 17 GRAM(S): 17 POWDER, FOR SOLUTION ORAL at 17:11

## 2024-01-01 RX ADMIN — IPRATROPIUM BROMIDE AND ALBUTEROL SULFATE 3 MILLILITER(S): .5; 3 SOLUTION RESPIRATORY (INHALATION) at 08:46

## 2024-01-01 RX ADMIN — Medication 5000 UNIT(S): at 18:16

## 2024-01-01 RX ADMIN — Medication 1 APPLICATION(S): at 13:54

## 2024-01-01 RX ADMIN — SODIUM PHOSPHATE, MONOBASIC, MONOHYDRATE AND SODIUM PHOSPHATE, DIBASIC ANHYDROUS 63.75 MILLIMOLE(S): 276; 142 INJECTION, SOLUTION INTRAVENOUS at 13:48

## 2024-01-01 RX ADMIN — Medication 5000 UNIT(S): at 17:04

## 2024-01-01 RX ADMIN — Medication 75 MILLILITER(S): at 18:55

## 2024-01-01 RX ADMIN — Medication 1 APPLICATION(S): at 17:57

## 2024-01-01 RX ADMIN — Medication 4 MILLILITER(S): at 09:18

## 2024-01-01 RX ADMIN — Medication 5000 UNIT(S): at 05:22

## 2024-01-01 RX ADMIN — Medication 20 MILLIGRAM(S): at 11:38

## 2024-01-01 RX ADMIN — Medication 5000 UNIT(S): at 17:41

## 2024-01-01 RX ADMIN — IPRATROPIUM BROMIDE AND ALBUTEROL SULFATE 3 MILLILITER(S): .5; 3 SOLUTION RESPIRATORY (INHALATION) at 20:23

## 2024-01-01 RX ADMIN — Medication 75 MILLILITER(S): at 17:24

## 2024-01-01 RX ADMIN — Medication 1 APPLICATION(S): at 17:25

## 2024-01-01 RX ADMIN — CHLORHEXIDINE GLUCONATE 1 APPLICATION(S): 40 SOLUTION TOPICAL at 05:22

## 2024-01-01 RX ADMIN — Medication 1 APPLICATION(S): at 21:02

## 2024-01-01 RX ADMIN — CEFEPIME 100 MILLIGRAM(S): 2 INJECTION, POWDER, FOR SOLUTION INTRAVENOUS at 13:26

## 2024-01-01 RX ADMIN — REMDESIVIR 200 MILLIGRAM(S): 5 INJECTION INTRAVENOUS at 12:06

## 2024-01-01 RX ADMIN — Medication 1 APPLICATION(S): at 17:03

## 2024-01-01 RX ADMIN — Medication 4.76 MICROGRAM(S)/KG/MIN: at 19:53

## 2024-01-01 RX ADMIN — IPRATROPIUM BROMIDE AND ALBUTEROL SULFATE 3 MILLILITER(S): .5; 3 SOLUTION RESPIRATORY (INHALATION) at 02:10

## 2024-01-01 RX ADMIN — IPRATROPIUM BROMIDE AND ALBUTEROL SULFATE 3 MILLILITER(S): .5; 3 SOLUTION RESPIRATORY (INHALATION) at 07:51

## 2024-01-01 RX ADMIN — POLYETHYLENE GLYCOL 3350 17 GRAM(S): 17 POWDER, FOR SOLUTION ORAL at 05:06

## 2024-01-01 RX ADMIN — Medication 50 MILLILITER(S): at 02:36

## 2024-01-01 RX ADMIN — Medication 1 APPLICATION(S): at 05:39

## 2024-01-01 RX ADMIN — Medication 5000 UNIT(S): at 05:05

## 2024-01-01 RX ADMIN — MIDODRINE HYDROCHLORIDE 15 MILLIGRAM(S): 5 TABLET ORAL at 05:37

## 2024-01-01 RX ADMIN — MIDODRINE HYDROCHLORIDE 15 MILLIGRAM(S): 5 TABLET ORAL at 05:38

## 2024-01-01 RX ADMIN — REMDESIVIR 200 MILLIGRAM(S): 5 INJECTION INTRAVENOUS at 12:15

## 2024-01-01 RX ADMIN — Medication 1 APPLICATION(S): at 05:30

## 2024-01-01 RX ADMIN — BACLOFEN 5 MILLIGRAM(S): 0.5 INJECTION INTRATHECAL at 05:26

## 2024-01-01 RX ADMIN — ACETAMINOPHEN 650 MILLIGRAM(S): 325 TABLET ORAL at 00:19

## 2024-01-01 RX ADMIN — Medication 1 APPLICATION(S): at 18:04

## 2024-01-01 RX ADMIN — Medication 1 APPLICATION(S): at 17:20

## 2024-01-01 RX ADMIN — Medication 5000 UNIT(S): at 17:16

## 2024-01-01 RX ADMIN — Medication 5000 UNIT(S): at 05:11

## 2024-01-01 RX ADMIN — Medication 2 TABLET(S): at 21:53

## 2024-01-01 RX ADMIN — POLYETHYLENE GLYCOL 3350 17 GRAM(S): 17 POWDER, FOR SOLUTION ORAL at 18:15

## 2024-01-01 RX ADMIN — Medication 5000 UNIT(S): at 05:00

## 2024-01-01 RX ADMIN — Medication 10.2 MICROGRAM(S)/KG/MIN: at 02:36

## 2024-01-01 RX ADMIN — Medication 10.2 MICROGRAM(S)/KG/MIN: at 12:15

## 2024-01-01 RX ADMIN — Medication 1 APPLICATION(S): at 17:29

## 2024-01-01 RX ADMIN — Medication 5000 UNIT(S): at 04:45

## 2024-01-01 RX ADMIN — Medication 1000 MILLILITER(S): at 08:47

## 2024-01-01 RX ADMIN — Medication 10.2 MICROGRAM(S)/KG/MIN: at 17:10

## 2024-01-01 RX ADMIN — Medication 5000 UNIT(S): at 17:59

## 2024-01-01 RX ADMIN — IPRATROPIUM BROMIDE AND ALBUTEROL SULFATE 3 MILLILITER(S): .5; 3 SOLUTION RESPIRATORY (INHALATION) at 16:01

## 2024-01-01 RX ADMIN — IPRATROPIUM BROMIDE AND ALBUTEROL SULFATE 3 MILLILITER(S): .5; 3 SOLUTION RESPIRATORY (INHALATION) at 15:12

## 2024-01-01 RX ADMIN — MIDODRINE HYDROCHLORIDE 15 MILLIGRAM(S): 5 TABLET ORAL at 13:50

## 2024-01-01 RX ADMIN — REMDESIVIR 200 MILLIGRAM(S): 5 INJECTION INTRAVENOUS at 10:48

## 2024-01-01 RX ADMIN — Medication 5000 UNIT(S): at 05:03

## 2024-08-14 ENCOUNTER — TRANSCRIPTION ENCOUNTER (OUTPATIENT)
Age: 74
End: 2024-08-14

## 2024-08-14 NOTE — CONSULT NOTE ADULT - CONVERSATION DETAILS
Reviewed patient's medical and social history as well as events leading to patient's hospitalization. Writer discussed patient's current diagnosis, medical condition and management, and likelihood of poor prognosis given advanced dementia with acute resp failure, DEIRDRE and elevated LFTs i/s/o leukocytosis and cyanosis, and life expectancy. Inquired about patient's wishes regarding extent of medical care to be provided including escalation of medical care into the ICU and use of vasopressor support. In addition, the writer inquired about thoughts regarding cardiopulmonary resuscitation, artificial nutrition and hydration including use of feeding tubes and IVF, antibiotics, and further investigative studies such as blood draws and radiology. Family showed insight into medical condition. Discussed code status which was DNR and intubate and recommended DNR/DNI. Family in agreement. Dtr agreed that there is no need to have pt go through undue suffering if her condition continues to worsen. New MOLST created with DNR/DNI status. All questions answered. Writer recommended continue work up and antibiotics for now and reassess over the next 1-2 days. If worse, we would then focus on quality of life and pt's comfort. Dtr is sole HCP. She just landed in [place] for vacation. She would like to be contacted for decisions. However, if she's unreachable she allows the medical team to reach out to her brother Sergio who would act as a surrogate. Psychosocial support provided.

## 2024-08-14 NOTE — ED PROVIDER NOTE - CARE PLAN
Principal Discharge DX:	Sepsis  Secondary Diagnosis:	Pneumonia  Secondary Diagnosis:	DEIRDRE (acute kidney injury)   1

## 2024-08-14 NOTE — CONSULT NOTE ADULT - SUBJECTIVE AND OBJECTIVE BOX
HPI:    PERTINENT PM/SXH:   Dementia        FAMILY HISTORY:   difficult to obtain from patient  ITEMS NOT CHECKED ARE NOT PRESENT    SOCIAL HISTORY:   Significant other/partner[ ]  Children[ ]  Scientologist/Spirituality:  Substance hx:  [ ]   Tobacco hx:  [ ]   Alcohol hx: [ ]   Living Situation: [ ]Home  [ ]Long term care  [ ]Rehab [ ]Other  Home Services: [ ] HHA [ ] Visting RN [ ] Hospice  Occupation:  Home Opioid hx:  [ ] Y [ ] N [ ] I-Stop Reference No:    ADVANCE DIRECTIVES:    [ ] Full Code [ ] DNR  MOLST  [ ]  Living Will  [ ]   DECISION MAKER(s):  [ ] Health Care Proxy(s)  [ ] Surrogate(s)  [ ] Guardian           Name(s): Phone Number(s):    BASELINE (I)ADL(s) (prior to admission):  [place]: [ ]Total  [ ] Moderate [ ]Dependent  Palliative Performance Status Version 2:         %    http://[facility].org/files/news/palliative_performance_scale_[email or web address].pdf    Allergies    ciprofloxacin (Unknown)  sulfa drugs (Unknown)  erythromycin (Unknown)  penicillin (Unknown)  penicillins (Unknown)    Intolerances    MEDICATIONS  (STANDING):    MEDICATIONS  (PRN):      PRESENT SYMPTOMS: [ ]Unable to obtain due to poor mentation   Source if other than patient:  [ ]Family   [ ]Team     Pain: [ ]yes [ ]no  QOL impact -   Location -                    Aggravating factors -  Quality -  Radiation -  Timing-  Severity (0-10 scale):  Minimal acceptable level (0-10 scale):     CPOT:    https://www.[facility].org/getattachment/[email or web address]/Critical-Care-Pain-Observation-Tool-(CPOT)    PAIN AD Score:   http://geriatrictoolkit.[place].[facility]/cog/painad.pdf (press ctrl +  left click to view)    Dyspnea:                           [ ]None[ ]Mild [ ]Moderate [ ]Severe     Respiratory Distress Observation Scale (RDOS):   A score of 0 to 2 signifies little or no respiratory distress, 3 signifies mild distress, scores 4 to 6 indicate moderate distress, and scores greater than 7 signify severe distress  https://www.[facility].ca/sites/default/files/PDFS/215541-tlvulipsrgd-ydmwibpu-jtstlgcmfki-glraf.pdf    Anxiety:                             [ ]None[ ]Mild [ ]Moderate [ ]Severe   Fatigue:                             [ ]None[ ]Mild [ ]Moderate [ ]Severe   Nausea:                             [ ]None[ ]Mild [ ]Moderate [ ]Severe   Loss of appetite:              [ ]None[ ]Mild [ ]Moderate [ ]Severe   Constipation:                    [ ]None[ ]Mild [ ]Moderate [ ]Severe    Other Symptoms:  [ ]All other review of systems negative     Palliative Performance Status Version 2:         %    http://[facility].org/files/news/palliative_performance_scale_ppsv2.pdf  PHYSICAL EXAM:  Vital Signs Last 24 Hrs  T(C): 37.3 (14 Aug 2024 07:55), Max: 37.3 (14 Aug 2024 07:55)  T(F): 99.2 (14 Aug 2024 07:55), Max: 99.2 (14 Aug 2024 07:55)  HR: 93 (14 Aug 2024 13:33) (91 - 132)  BP: 99/53 (14 Aug 2024 13:33) (97/60 - 121/61)  BP(mean): --  RR: 20 (14 Aug 2024 13:33) (20 - 28)  SpO2: 98% (14 Aug 2024 13:33) (80% - 100%)    Parameters below as of 14 Aug 2024 13:33  Patient On (Oxygen Delivery Method): nasal cannula  O2 Flow (L/min): 5   I&O's Summary      GENERAL:  [X ] No acute distress [ ]Lethargic  [ ]Unarousable  [ ]Verbal  [ ]Non-Verbal [ ]Cachexia    BEHAVIORAL/PSYCH:  [ ]Alert and Oriented x  [ ] Anxiety [ ] Delirium [ ] Agitation [X ] Calm   EYES: [ ] No scleral icterus [ ] Scleral icterus [ ] Closed  ENMT:  [ ]Dry mouth  [ ]No external oral lesions [ ] No external ear or nose lesions  CARDIOVASCULAR:  [ ]Regular [ ]Irregular [ ]Tachy [ ]Not Tachy  [ ]Justice [ ] Edema [ ] No edema  PULMONARY:  [ ]Tachypnea  [ ]Audible excessive secretions [X ] No labored breathing [ ] labored breathing  GASTROINTESTINAL: [ ]Soft  [ ]Distended  [ X]Not distended [ ]Non tender [ ]Tender  MUSCULOSKELETAL: [ ]No clubbing [ ] clubbing  [ ] No cyanosis [ ] cyanosis  NEUROLOGIC: [ ]No focal deficits  [ ]Follows commands  [ ]Does not follow commands  [ ]Cognitive impairment  [ ]Dysphagia  [ ]Dysarthria  [ ]Paresis   SKIN: [ ] Jaundiced [X ] Non-jaundiced [ ]Rash [ ]No Rash [ ] Warm [ ] Dry  MISC/LINES: [ ] ET tube [ ] Trach [ ]NGT/OGT [ ]PEG [ ]Fischer    CRITICAL CARE:  [ ] Shock Present  [ ]Septic [ ]Cardiogenic [ ]Neurologic [ ]Hypovolemic  [ ]  Vasopressors [ ]  Inotropes   [ ]Respiratory failure present [ ]Mechanical ventilation [ ]Non-invasive ventilatory support [ ]High flow  [ ]Acute  [ ]Chronic [ ]Hypoxic  [ ]Hypercarbic [ ]Other  [ ]Other organ failure     LABS: reviewed by me                        16.7   11.03 )-----------( 344      ( 14 Aug 2024 08:15 )             53.4   08-14    164<HH>  |  118<H>  |  150<HH>  ----------------------------<  182<H>  5.7<H>   |  20  |  3.4<H>    Ca    9.5      14 Aug 2024 08:15    TPro  7.4  /  Alb  3.7  /  TBili  1.0  /  DBili  x   /  AST  59<H>  /  ALT  70<H>  /  AlkPhos  142<H>  08-14  PT/INR - ( 14 Aug 2024 08:15 )   PT: 20.50 sec;   INR: 1.79 ratio         PTT - ( 14 Aug 2024 08:15 )  PTT:35.9 sec    Urinalysis Basic - ( 14 Aug 2024 08:40 )    Color: Dark Yellow / Appearance: Clear / SG: >1.030 / pH: x  Gluc: x / Ketone: Negative mg/dL  / Bili: Moderate / Urobili: 1.0 mg/dL   Blood: x / Protein: Negative mg/dL / Nitrite: Negative   Leuk Esterase: Trace / RBC: 2 /HPF / WBC 2 /HPF   Sq Epi: x / Non Sq Epi: x / Bacteria: Moderate /HPF      RADIOLOGY & ADDITIONAL STUDIES: reviewed by me    PROTEIN CALORIE MALNUTRITION PRESENT: [ ]mild [ ]moderate [ ]severe [ ]underweight [ ]morbid obesity  https://www.andeal.org/vault/2441/web/files/ONC/Table_Clinical%20Characteristics%20to%20Document%20Malnutrition-White%20JV%20et%20al%202012.pdf    Height (cm): 147.3 (08-14-24 @ 07:49)  Weight (kg): 36.3 (08-14-24 @ 07:49)  BMI (kg/m2): 16.7 (08-14-24 @ 07:49)    [ ]PPSV2 < or = to 30% [ ]significant weight loss  [ ]poor nutritional intake  [ ]anasarca      [ ]Artificial Nutrition          Palliative Care Spiritual/Emotional Screening Tool Question  Severity (0-4):                    OR                    [X ] Unable to determine/NA  Score of 2 or greater indicates recommendation of Chaplaincy referral  Chaplaincy Referral: [ ] Yes [ ] Refused [ ] Following     Caregiver [place]:  [ ] Yes [ ] No    OR    [x ] Unable to determine. Will assess at later time if appropriate.  Social Work Referral [ ]  Patient and Family Centered Care Referral [ ]    Anticipatory Grief Present: [ ] Yes [ ] No    OR     [ x] Unable to determine. Will assess at later time if appropriate.  Social Work Referral [ ]  Patient and Family Centered Care Referral [ ]    REFERRALS:   [ ]Chaplaincy  [ ]Hospice  [ ]Child Life  [ ]Social Work  [ ]Case management [ ]Holistic Therapy     Palliative care education provided to patient and/or family    Goals of Care Document:     ______________

## 2024-08-14 NOTE — ED PROVIDER NOTE - OBJECTIVE STATEMENT
74-year-old female past medical history manage dementia alert and oriented x 0 bedbound presents emergency department for acute shortness of breath per daughter-in-law at bedside patient is DNR/DNI.  Upon initial triage vital signs patient significant for hypoxia, hypotension, tachycardia.    I am unable to obtain a comprehensive history, review of systems, past medical history, and/or physical exam due to constraints imposed by the urgency of the patient's clinical condition and/or mental status.

## 2024-08-14 NOTE — ED ADULT TRIAGE NOTE - CHIEF COMPLAINT QUOTE
As per EMS, patient with HX of dementia has had increasing confusion and decreasing level of consciousness. Patient is tachypneic and hypoxic on triage

## 2024-08-14 NOTE — ED PROVIDER NOTE - PHYSICAL EXAMINATION
Physical Exam    Vital Signs: I have reviewed the initial vital signs.  Constitutional: appears stated age, no acute distress  Eyes: Conjunctiva pink, Sclera clear,   Cardiovascular: S1 and S2, Tachycardia, regular rhythm, well-perfused extremities, radial pulses equal and 2+, pedal pulses 2+ and equal  Respiratory: labored respiratory effort, Diffuse rhonchorous breath sounds  Gastrointestinal: soft, non-tender abdomen, no pulsatile mass, normal bowl sounds  Musculoskeletal: Contracted x 4 extremities  Integumentary Lower extremity is dusky however has pedal pulses 2+  Neurologic: awake, alert, cranial nerves II-XII grossly intact, extremities’ motor and sensory functions grossly intact  Psychiatric: appropriate mood, appropriate affect

## 2024-08-14 NOTE — ED ADULT NURSE NOTE - SEPSIS REFERENCE DATA CRITERIA 1
Abormal VS: Temp > 100F or < 96.8F; SBP < 90 mmHG; HR > 120bpm; Resp > 24/min
(1) body pink, extremities blue

## 2024-08-14 NOTE — H&P ADULT - NSHPPHYSICALEXAM_GEN_ALL_CORE
VITALS:   T(C): 34.7 (08-14-24 @ 14:11), Max: 37.3 (08-14-24 @ 07:55)  HR: 93 (08-14-24 @ 14:11) (91 - 132)  BP: 88/62 (08-14-24 @ 14:11) (88/62 - 121/61)  RR: 22 (08-14-24 @ 14:11) (20 - 28)  SpO2: 89% (08-14-24 @ 14:11) (80% - 100%)    GENERAL: contracted. ill appearing     EYES:  ENT: excess secretions   HEART: tachycardic   LUNGS:labored respirations, tachypneic. rhonchi in all fields   ABDOMEN: Soft, nontender, nondistended,  EXTREMITIES: contracted x 4   NERVOUS SYSTEM:  A&Ox0, groans to pain. no other meaningful communication   SKIN: stage 4 on sacrum

## 2024-08-14 NOTE — CHART NOTE - NSCHARTNOTEFT_GEN_A_CORE
Sodium did decrease to 163 on current management, continue to monitor aiming for sodium of 155-160 Sodium did decrease to 163 on current management (D5W to be switched to D5 0.45NS overnight), continue to monitor aiming for sodium of 155-160. Sonia looks comfortable on 100% NRB mask.

## 2024-08-14 NOTE — H&P ADULT - HISTORY OF PRESENT ILLNESS
74F with severe dementa bedbound aox0 presents by ambulance for increased work of breathing, cough, and decreased responsiveness at home. Patient lives with son and daughter in law who noticed her symptoms starting in the last 1-2 days. Daughter in law notes that patient appeared to have blue feet this morning, prompting her to call 911.   HCP is Mora Fernando who is currently in [place], phone number provided by daughter in law: [phone].  74F with severe dementa bedbound aox0 presents by ambulance for increased work of breathing, cough, and decreased responsiveness at home. Patient lives with son and daughter in law who noticed her symptoms starting in the last 1-2 days. Daughter in law notes that patient appeared to have blue feet this morning, prompting her to call 911. Patient typically eat puree at home (spoon fed) and often coughs or chokes if fed too quickly or improperly positioned.   HCP is Mora Fernando who is currently in [place], phone number provided by daughter in law: [phone].

## 2024-08-14 NOTE — H&P ADULT - NSHPLABSRESULTS_GEN_ALL_CORE
16.7   11.03 )-----------( 344      ( 14 Aug 2024 08:15 )             53.4   08-14    164<HH>  |  118<H>  |  150<HH>  ----------------------------<  182<H>  5.7<H>   |  20  |  3.4<H>    Ca    9.5      14 Aug 2024 08:15    TPro  7.4  /  Alb  3.7  /  TBili  1.0  /  DBili  x   /  AST  59<H>  /  ALT  70<H>  /  AlkPhos  142<H>  08-14    Troponin T, High Sensitivity Result: 142: Troponin T, High Sensitivity Result: 214:      < from: CT Abdomen and Pelvis No Cont (08.14.24 @ 10:56) >    IMPRESSION:  Limited noncontrast study without IV and oral contrast.    Right basilar pleural effusion with adjacent atelectasis/consolidation.   Apparent occlusion of the right lower lobe bronchus causing atelectasis.   Mild left basilar atelectasis/consolidation. Infection is not excluded.   Consider interval follow-up chest CT with IV contrast for further   evaluation.    < end of copied text >

## 2024-08-14 NOTE — H&P ADULT - ASSESSMENT
74F with severe dementa bedbound aox0 presents by ambulance for increased work of breathing, cough, and decreased responsiveness at home.    #septic shock 2/2 possible aspiration pneumonia   #heidi (unknown baseline Cr due to sepsis)   #elevated LFTs  #elevated troponin - likely demand ischemia   - s/p vanco&cefepime in ER   - ID consult   - palliative consult   - CT chest (no contrast due to heidi) to eval bronchus occlusion   - palliative care confirmed GOC with HCP Mora Fernando [phone]: DNI/DNI      #severe HEIDI   #hyperkalemia 6.9  #hypernatremia 164  - s/p 2L bolus LR in ER. continue D5 1/2NS @75cc/hr  - repeat labs as 3pm   - nephrology consult  - continue trujillo     #stage 4 sacral wound   - does not appear infected  - local wound care, pressure offloading       DNR/DNI  HCP daughter Mora Fernando [phone]. if unable to reach, may consult sonSergio [phone] in case of emergency.      74F with severe dementa bedbound aox0 presents by ambulance for increased work of breathing, cough, and decreased responsiveness at home.    #septic shock 2/2 possible aspiration pneumonia   #deirdre (unknown baseline Cr due to sepsis)   #elevated LFTs  #elevated troponin - likely demand ischemia   #hypothermia 94F  - s/p vanco&cefepime in ER   - ID consult   - palliative consult   - CT chest (no contrast due to deirdre) to eval bronchus occlusion   - warming blanket   - nonrebreather for hypoxia... improved to 96%  - palliative care confirmed GOC with HCP Mora Fernando [phone]: DNI/DNI    #severe DEIRDRE   #hyperkalemia 6.9  #hypernatremia 164  - s/p 2L bolus LR in ER. continue D5 1/2NS @75cc/hr  - repeat labs as 3pm   - nephrology consult  - continue trujillo     #stage 4 sacral wound   - does not appear infected  - local wound care, pressure offloading       DNR/DNI  HCP daughter Mora Hanleyalisson [phone]. if unable to reach, may consult sonSergioprecious [phone] in case of emergency.      74F with severe dementa bedbound aox0 presents by ambulance for increased work of breathing, cough, and decreased responsiveness at home.    #septic shock 2/2 possible aspiration pneumonia   #deirdre (unknown baseline Cr due to sepsis)   #elevated LFTs  #elevated troponin - likely demand ischemia   #hypothermia 94F  - s/p vanco&cefepime in ER   - ID consult   - palliative consult   - CT chest (no contrast due to deirdre) to eval bronchus occlusion   - warming blanket   - nonrebreather for hypoxia... improved to 96%  - palliative care confirmed GOC with HCP Mora Fernando [phone]: DNI/DNI    #severe DEIRDRE   #hyperkalemia 6.9  #hypernatremia 164  - s/p 2L bolus LR in ER. continue D5 1/2NS @75cc/hr  - repeat labs as 3pm   - nephrology consult  - continue trujillo     #h/o dysphagia   #severe dementia   - NPO pending speech and swallow eval  - nutrition consult     #stage 4 sacral wound   - does not appear infected  - local wound care, pressure offloading       DNR/DNI  HCP daughter Mora Hanleyalisson [phone]. if unable to reach, may consult son, Sergio Zapataprecious [phone] in case of emergency.      74F with severe dementa bedbound aox0 presents by ambulance for increased work of breathing, cough, and decreased responsiveness at home.    #septic shock 2/2 possible aspiration pneumonia   #deirdre (unknown baseline Cr due to sepsis)   #elevated LFTs  #elevated troponin - likely demand ischemia   #hypothermia 94F  - s/p vanco&cefepime in ER   - ID consult: continue cefepime 1g q24, vanco dose by trough   - palliative consult   - CT chest (no contrast due to deirdre) to eval bronchus occlusion   - warming blanket   - nonrebreather for hypoxia... improved to 96%  - check RVP..... isolation until resulted   - sputum culture   - palliative care confirmed GOC with HCP Mora Hanleyalisson [phone]: DNI/DNI    #severe DEIRDRE   #hyperkalemia 6.9  #hypernatremia 164  - s/p 2L bolus LR in ER. continue D5 1/2NS @75cc/hr  - repeat labs as 3pm   - nephrology consult  - continue trujillo     #h/o dysphagia   #severe dementia   - NPO pending speech and swallow eval  - nutrition consult     #stage 4 sacral wound   - does not appear infected  - local wound care, pressure offloading       DNR/DNI  HCP daughter Mora Fernando [phone]. if unable to reach, may consult son, Sergio Fernando [phone] in case of emergency.

## 2024-08-14 NOTE — PATIENT PROFILE ADULT - NSPROMEDSBROUGHTTOHOSP_GEN_A_NUR
Patient admitted, consent signed and questions answered. Patient ready for procedure. Call light to reach with side rails up 2 of 2. Bilat groin rt wrist hair clipped with writer and Chasity  present.  daughter at bedside with patient.    no

## 2024-08-14 NOTE — PATIENT PROFILE ADULT - FALL HARM RISK - HARM RISK INTERVENTIONS

## 2024-08-14 NOTE — ED PROVIDER NOTE - ATTENDING CONTRIBUTION TO CARE
I have personally performed a history and physical exam on this patient and personally directed the management of the patient. Patient is a 74-year-old female past medical history of dementia unable to corroborate history patient is brought in by daughter-in-law for worsening shortness of breath in addition daughter notes that her bilateral lower extremities are cold patient is unable to provide any history at this time    On physical exam patient is tachypneic with accessory muscle use as well as tachycardic however normocephalic/atraumatic pupils equal round reactive light and accommodation oropharynx clear but dry chest has diffuse rhonchorous breath sounds noted with accessory use abdomen soft nontender nondistended bowel sounds positive extremities contracted lower extremities pulses 2+ capillary refills normal however bilateral feet are cyanotic in nature hands are normal patient is at baseline mental status per her her daughter-in-law (baseline dementia)    Assessment plan patient presents for evaluation of worsening shortness of breath most likely consistent with infection therefore we followed septic protocols given IV fluids 30 cc/kg initially started IV vancomycin and cefepime obtain blood cultures lactate been ordered a repeat lactate we obtain EKG per my independent evaluation not consistent with STEMI we obtained chest x-ray per my independent evaluation not consistent with pneumothorax questionable opacities noted patient reevaluated actually improved her respiratory rate slowed the discoloration of her lower extremities actually improved patient found to be hyponatremic with DEIRDRE daughter-in-law is here not the healthcare proxy given to the son who initially had provided molst information however, as he is not the health care proxy we had another conversation with her daughter/son in law who is the proxy patient made dnr but not dni   patient improved here in the ED i will admit for further concern we have consulted dr. west who advised admission for further evaluation symptoms most consistent with infectious cause given her h/p possible aspiration

## 2024-08-14 NOTE — CONSULT NOTE ADULT - ASSESSMENT
73 yo F with severe dementia bedbound aox0 presents by ambulance for increased work of breathing, cough, and decreased responsiveness at home.    #Septic shock 2/2 possible aspiration pneumonia   #DEIRDRE    #Elevated LFTs  #Elevated troponin - likely demand ischemia   #hypothermia 94F  - ID consult: cefepime, vanc   - nonrebreather for hypoxia... improved to 96%  - work up as per primary team    #severe DEIRDRE   #hyperkalemia 6.9  #hypernatremia 164  - s/p IVF   - nephrology consulted  - continue trujillo cath    #h/o dysphagia   #Severe dementia   - NPO pending speech and swallow eval  - nutrition consult     #stage 4 sacral wound   - local wound care, pressure offloading     #ACP/GOC  - New MOLST created with DNR/DNI status. Continue current management and reassess over the next 1-2 days. If worse, family would then focus on quality of life and pt's comfort. Dtr is sole HCP. She is in [place] on vacation. She would like to be contacted for decisions. However, if she's unreachable she allows the medical team to reach out to her brother Sergio who would act as a surrogate.   - HCP daughter Mora Fernando [phone]. If unable to reach, may consult son, Sergio Fernando [phone] in case of emergency.

## 2024-08-14 NOTE — ED ADULT NURSE NOTE - NSFALLHARMRISKINTERV_ED_ALL_ED
Assistance OOB with selected safe patient handling equipment if applicable/Assistance with ambulation/Communicate risk of Fall with Harm to all staff, patient, and family/Monitor gait and stability/Provide patient with walking aids/Provide visual cue: red socks, yellow wristband, yellow gown, etc/Reinforce activity limits and safety measures with patient and family/Toileting schedule using arm’s reach rule for commode and bathroom/Bed in lowest position, wheels locked, appropriate side rails in place/Call bell, personal items and telephone in reach/Instruct patient to call for assistance before getting out of bed/chair/stretcher/Non-slip footwear applied when patient is off stretcher/[place] to call system/Physically safe environment - no spills, clutter or unnecessary equipment/Purposeful Proactive Rounding/Room/bathroom lighting operational, light cord in reach

## 2024-08-14 NOTE — ED PROVIDER NOTE - PROGRESS NOTE DETAILS
Spoke with daughter who is healthcare proxy over the phone filled out MOLST, patient is DNR however can be intubated if situation arises at this time patient has been stabilized blood pressure stable with fluids, on 5 L nasal cannula no acute shortness of breath at this time. Spoke with ICU approved for telemetry.

## 2024-08-14 NOTE — CONSULT NOTE ADULT - ASSESSMENT
74F with severe dementa bedbound aox0 presents by ambulance for increased work of breathing, cough, and decreased responsiveness at home.    ID is consulted for sepsis  Hypothermia to 94.5, WBC 11.03  Hypoxemia requiring NRB  Lactate 6.8  BCx pending  UA WBC 2, UCx pending    CXR with Bibasilar opacities  CT A/P wo contrast 8/14  Right basilar pleural effusion with adjacent atelectasis/consolidation.   Apparent occlusion of the right lower lobe bronchus causing atelectasis.   Mild left basilar atelectasis/consolidation. Infection is not excluded.   Consider interval follow-up chest CT with IV contrast for further   evaluation.    As per daughter-in-law at bedside, she potentially could have aspirated from feeding  Has a lot of congestion in the past 24-48 hrs    Antibiotics:  Cefepime 8/14  Vancomycin 8/14      IMPRESSION:  RLL pneumonia  Sepsis  Acute hypoxemic respiratory failure  DEIRDRE  Sacral ulcer  Intertrigo  Constipation  Bedbound  Dementia  Immunosuppression / Immunosenescence secondary to multiple comorbidities which could result in poor clinical outcome      RECOMMENDATIONS:  - Continue IV cefepime 1g q24hrs (CrCl <10) for now  - Hold further dose of vancomycin; check vancomycin level in AM  - Obtain COVID/flu/RSV  - Follow up BCx and UCx  - Follow up CT chest  - Start topical clotrimazole  - Bowel regimen  - Offloading and frequent position changes, aspiration precaution  - Trend WBC, fever curve, transaminases, creatinine daily  - Poor prognosis  - Update given to daughter-in-law at bedside      Imani Morrow D.O.  Attending Physician  Division of Infectious Diseases  [facility]  Please contact me via [employer] Teams   74F with severe dementa bedbound aox0 presents by ambulance for increased work of breathing, cough, and decreased responsiveness at home.    ID is consulted for sepsis  Hypothermia to 94.5, WBC 11.03  Hypoxemia requiring NRB  Lactate 6.8  BCx pending  UA WBC 2    CXR with Bibasilar opacities  CT A/P wo contrast 8/14  Right basilar pleural effusion with adjacent atelectasis/consolidation.   Apparent occlusion of the right lower lobe bronchus causing atelectasis.   Mild left basilar atelectasis/consolidation. Infection is not excluded.   Consider interval follow-up chest CT with IV contrast for further   evaluation.    As per daughter-in-law at bedside, she potentially could have aspirated from feeding  Has a lot of congestion in the past 24-48 hrs    Antibiotics:  Cefepime 8/14  Vancomycin 8/14      IMPRESSION:  RLL pneumonia  Sepsis  Acute hypoxemic respiratory failure  DEIRDRE  Lactic acidosis  Sacral ulcer, no sign of infection  Intertrigo  Constipation  Bedbound  Dementia  Immunosuppression / Immunosenescence secondary to multiple comorbidities which could result in poor clinical outcome      RECOMMENDATIONS:  - Continue IV cefepime 1g q24hrs (CrCl <10) for now  - Hold further dose of vancomycin; check vancomycin level in AM  - Obtain COVID/flu/RSV  - Follow up BCx  - Follow up CT chest  - Start topical clotrimazole  - Bowel regimen  - Offloading and frequent position changes, aspiration precaution  - Trend WBC, fever curve, transaminases, creatinine daily  - Poor prognosis  - Update given to daughter-in-law at bedside      Imani Morrow D.O.  Attending Physician  Division of Infectious Diseases  [facility]  Please contact me via [employer] Teams

## 2024-08-14 NOTE — CONSULT NOTE ADULT - TIME BILLING
I have personally seen and examined this patient.    I have reviewed all pertinent clinical information and reviewed all relevant imaging and diagnostic studies personally.   I discussed recommendations with the primary team.
Time spent on total encounter assessing patient, examination, chart review, counseling and coordinating care by the attending physician/nurse/care manager as well as GOC discussion.

## 2024-08-14 NOTE — CONSULT NOTE ADULT - SUBJECTIVE AND OBJECTIVE BOX
INFECTIOUS DISEASE CONSULT NOTE    Patient is a 74y old  Female who presents with a chief complaint of septic shock (14 Aug 2024 14:34)    HPI:  74F with severe dementa bedbound aox0 presents by ambulance for increased work of breathing, cough, and decreased responsiveness at home. Patient lives with son and daughter in law who noticed her symptoms starting in the last 1-2 days. Daughter in law notes that patient appeared to have blue feet this morning, prompting her to call 911. Patient typically eat puree at home (spoon fed) and often coughs or chokes if fed too quickly or improperly positioned.   HCP is Mora Fernando who is currently in [place], phone number provided by daughter in law: [phone].  (14 Aug 2024 14:34)      Prior hospital charts reviewed [Yes]  Primary team notes reviewed [Yes]  Other consultant notes reviewed [Yes]    REVIEW OF SYSTEMS:  Unable to provide due to cognitive impairment      PAST MEDICAL & SURGICAL HISTORY:  Dementia        SOCIAL HISTORY:  Unable to provide due to cognitive impairment    FAMILY HISTORY:  Unable to provide due to cognitive impairment      Allergies:  ciprofloxacin (Unknown)  sulfa drugs (Unknown)  erythromycin (Unknown)  penicillin (Unknown)  penicillins (Unknown)      ANTIMICROBIALS:      ANTIMICROBIALS (past 90 days):  MEDICATIONS  (STANDING):  cefepime   IVPB   100 mL/Hr IV Intermittent (08-14-24 @ 11:33)    vancomycin  IVPB.   250 mL/Hr IV Intermittent (08-14-24 @ 09:46)        OTHER MEDS:   MEDICATIONS  (STANDING):      VITALS:  Vital Signs Last 24 Hrs  T(F): 94.5 (08-14-24 @ 14:11), Max: 99.2 (08-14-24 @ 07:55)    Vital Signs Last 24 Hrs  HR: 93 (08-14-24 @ 14:11) (91 - 132)  BP: 88/62 (08-14-24 @ 14:11) (88/62 - 121/61)  RR: 22 (08-14-24 @ 14:11)  SpO2: 96% (08-14-24 @ 14:49) (80% - 100%)  Wt(kg): --    EXAM:  GENERAL: NAD, lying in bed. very frail and weak  HEAD: No head lesions  EYES: Conjunctiva pink and cornea white  EAR, NOSE, MOUTH, THROAT: Normal external ears and nose, no discharges; dry mucous membranes; + NRB  NECK: Supple, no JVD  RESPIRATORY: RLL rhonchi  CARDIOVASCULAR: S1 S2, tachycardia  GASTROINTESTINAL: Soft, nontender, nondistended; normoactive bowel sounds  GENITOURINARY: + trujillo catheter  EXTREMITIES: Severely contracted extremities  NERVOUS SYSTEM: Not responsive, not oriented, moans when manipulated  MUSCULOSKELETAL: No joint erythema, swelling  SKIN: no superficial thrombophlebitis  PSYCH: Normal affect      Labs:                        16.7   11.03 )-----------( 344      ( 14 Aug 2024 08:15 )             53.4     08-14    164<HH>  |  118<H>  |  150<HH>  ----------------------------<  182<H>  5.7<H>   |  20  |  3.4<H>    Ca    9.5      14 Aug 2024 08:15    TPro  7.4  /  Alb  3.7  /  TBili  1.0  /  DBili  x   /  AST  59<H>  /  ALT  70<H>  /  AlkPhos  142<H>  08-14      WBC Trend:  WBC Count: 11.03 (08-14-24 @ 08:15)      Auto Neutrophil #: 8.60 K/uL (08-14-24 @ 08:15)      Creatine Trend:  Creatinine: 3.4 (08-14)      Liver Biochemical Testing Trend:  Alanine Aminotransferase (ALT/SGPT): 70 *H* (08-14)  Aspartate Aminotransferase (AST/SGOT): 59 (08-14-24 @ 08:15)  Bilirubin Total: 1.0 (08-14)      Trend LDH      Auto Eosinophil %: 0.0 % (08-14-24 @ 08:15)      Urinalysis Basic - ( 14 Aug 2024 08:40 )    Color: Dark Yellow / Appearance: Clear / SG: >1.030 / pH: x  Gluc: x / Ketone: Negative mg/dL  / Bili: Moderate / Urobili: 1.0 mg/dL   Blood: x / Protein: Negative mg/dL / Nitrite: Negative   Leuk Esterase: Trace / RBC: 2 /HPF / WBC 2 /HPF   Sq Epi: x / Non Sq Epi: x / Bacteria: Moderate /HPF          MICROBIOLOGY:    Urinalysis with Rflx Culture (collected 14 Aug 2024 08:40)      Troponin T, High Sensitivity Result: 142 (08-14)  Troponin T, High Sensitivity Result: 214 (08-14)    Blood Gas Venous - Lactate: 5.2 (08-14 @ 12:53)  Lactate, Blood: 6.8 (08-14 @ 10:20)  Lactate, Blood: 5.1 (08-14 @ 08:15)        RADIOLOGY:  imaging below personally reviewed    < from: Xray Chest 1 View-PORTABLE IMMEDIATE (08.14.24 @ 09:04) >  Impression:    Bibasilar opacities.      < end of copied text >    < from: CT Abdomen and Pelvis No Cont (08.14.24 @ 10:56) >  IMPRESSION:  Limited noncontrast study without IV and oral contrast.    Right basilar pleural effusion with adjacent atelectasis/consolidation.   Apparent occlusion of the right lower lobe bronchus causing atelectasis.   Mild left basilar atelectasis/consolidation. Infection is not excluded.   Consider interval follow-up chest CT with IV contrast for further   evaluation.      < end of copied text >   INFECTIOUS DISEASE CONSULT NOTE    Patient is a 74y old  Female who presents with a chief complaint of septic shock (14 Aug 2024 14:34)    HPI:  74F with severe dementa bedbound aox0 presents by ambulance for increased work of breathing, cough, and decreased responsiveness at home. Patient lives with son and daughter in law who noticed her symptoms starting in the last 1-2 days. Daughter in law notes that patient appeared to have blue feet this morning, prompting her to call 911. Patient typically eat puree at home (spoon fed) and often coughs or chokes if fed too quickly or improperly positioned.   HCP is Mora Fernando who is currently in [place], phone number provided by daughter in law: [phone].  (14 Aug 2024 14:34)      Prior hospital charts reviewed [Yes]  Primary team notes reviewed [Yes]  Other consultant notes reviewed [Yes]    REVIEW OF SYSTEMS:  Unable to provide due to cognitive impairment      PAST MEDICAL & SURGICAL HISTORY:  Dementia        SOCIAL HISTORY:  Unable to provide due to cognitive impairment    FAMILY HISTORY:  Unable to provide due to cognitive impairment      Allergies:  ciprofloxacin (Unknown)  sulfa drugs (Unknown)  erythromycin (Unknown)  penicillin (Unknown)  penicillins (Unknown)      ANTIMICROBIALS:      ANTIMICROBIALS (past 90 days):  MEDICATIONS  (STANDING):  cefepime   IVPB   100 mL/Hr IV Intermittent (08-14-24 @ 11:33)    vancomycin  IVPB.   250 mL/Hr IV Intermittent (08-14-24 @ 09:46)        OTHER MEDS:   MEDICATIONS  (STANDING):      VITALS:  Vital Signs Last 24 Hrs  T(F): 94.5 (08-14-24 @ 14:11), Max: 99.2 (08-14-24 @ 07:55)    Vital Signs Last 24 Hrs  HR: 93 (08-14-24 @ 14:11) (91 - 132)  BP: 88/62 (08-14-24 @ 14:11) (88/62 - 121/61)  RR: 22 (08-14-24 @ 14:11)  SpO2: 96% (08-14-24 @ 14:49) (80% - 100%)  Wt(kg): --    EXAM:  GENERAL: NAD, lying in bed. very frail and weak  HEAD: No head lesions  EYES: Conjunctiva pink and cornea white  EAR, NOSE, MOUTH, THROAT: Normal external ears and nose, no discharges; dry mucous membranes; + NRB  NECK: Supple, no JVD  RESPIRATORY: RLL rhonchi  CARDIOVASCULAR: S1 S2, tachycardia  GASTROINTESTINAL: Soft, nontender, nondistended; normoactive bowel sounds  GENITOURINARY: + trujillo catheter  EXTREMITIES: Severely contracted extremities  NERVOUS SYSTEM: Not responsive, not oriented, moans when manipulated  MUSCULOSKELETAL: No joint erythema, swelling  SKIN: Sacral ulcer stage 2, no purulence. no superficial thrombophlebitis  PSYCH: Lethargic      Labs:                        16.7   11.03 )-----------( 344      ( 14 Aug 2024 08:15 )             53.4     08-14    164<HH>  |  118<H>  |  150<HH>  ----------------------------<  182<H>  5.7<H>   |  20  |  3.4<H>    Ca    9.5      14 Aug 2024 08:15    TPro  7.4  /  Alb  3.7  /  TBili  1.0  /  DBili  x   /  AST  59<H>  /  ALT  70<H>  /  AlkPhos  142<H>  08-14      WBC Trend:  WBC Count: 11.03 (08-14-24 @ 08:15)      Auto Neutrophil #: 8.60 K/uL (08-14-24 @ 08:15)      Creatine Trend:  Creatinine: 3.4 (08-14)      Liver Biochemical Testing Trend:  Alanine Aminotransferase (ALT/SGPT): 70 *H* (08-14)  Aspartate Aminotransferase (AST/SGOT): 59 (08-14-24 @ 08:15)  Bilirubin Total: 1.0 (08-14)      Trend LDH      Auto Eosinophil %: 0.0 % (08-14-24 @ 08:15)      Urinalysis Basic - ( 14 Aug 2024 08:40 )    Color: Dark Yellow / Appearance: Clear / SG: >1.030 / pH: x  Gluc: x / Ketone: Negative mg/dL  / Bili: Moderate / Urobili: 1.0 mg/dL   Blood: x / Protein: Negative mg/dL / Nitrite: Negative   Leuk Esterase: Trace / RBC: 2 /HPF / WBC 2 /HPF   Sq Epi: x / Non Sq Epi: x / Bacteria: Moderate /HPF          MICROBIOLOGY:    Urinalysis with Rflx Culture (collected 14 Aug 2024 08:40)      Troponin T, High Sensitivity Result: 142 (08-14)  Troponin T, High Sensitivity Result: 214 (08-14)    Blood Gas Venous - Lactate: 5.2 (08-14 @ 12:53)  Lactate, Blood: 6.8 (08-14 @ 10:20)  Lactate, Blood: 5.1 (08-14 @ 08:15)        RADIOLOGY:  imaging below personally reviewed    < from: Xray Chest 1 View-PORTABLE IMMEDIATE (08.14.24 @ 09:04) >  Impression:    Bibasilar opacities.      < end of copied text >    < from: CT Abdomen and Pelvis No Cont (08.14.24 @ 10:56) >  IMPRESSION:  Limited noncontrast study without IV and oral contrast.    Right basilar pleural effusion with adjacent atelectasis/consolidation.   Apparent occlusion of the right lower lobe bronchus causing atelectasis.   Mild left basilar atelectasis/consolidation. Infection is not excluded.   Consider interval follow-up chest CT with IV contrast for further   evaluation.      < end of copied text >

## 2024-08-15 NOTE — PROGRESS NOTE ADULT - ASSESSMENT
74F with severe dementa bedbound aox0 presents by ambulance for increased work of breathing, cough, and decreased responsiveness at home.      Assessment   Septic shock on Vasopressors   Acute Hypoxic Respiratory Failure >>NRB   Possible Aspiration pneumonia, Multifocal     COVID -19 Infection   severe Lactic Acidosis from Infection, improving   Acute Kidney Injury, likely Pre-renal >> unknown baseline, Improving    Transaminitis Likely from Severe Infection   Elevated troponin - Likely demand ischemia, Keep On Tele   Hypovolemic Hypernatremia 164>> improving   Dysphagia + Dementia + Bedbound + stage 4 sacral wound   Hypothermia   Hyperkalemia       PLAN   -  - ID consult appreciated continue cefepime 1g q24, vanco dose by trough   - palliative consult  - palliative care confirmed GOC with HCP Mora Fernando [phone]: DNI/DNI  - NPO pending speech and swallow eval  - nutrition consult  - does not appear infected  - local wound care, pressure offloading   DNR/DNI  HCP daughter Mora Fernando [phone]. if unable to reach, may consult sonSergiocathyalisson [phone] in case of emergency.          74F with severe dementa bedbound aox0 presents by ambulance for increased work of breathing, cough, and decreased responsiveness at home.      Assessment   Septic shock on Vasopressors   Acute Hypoxic Respiratory Failure >>NRB   Possible Aspiration pneumonia, Multifocal     COVID -19 Infection   severe Lactic Acidosis from Infection, improving   Acute Kidney Injury, likely Pre-renal >> unknown baseline, Improving    Mild Transaminitis Likely from Severe Infection >>monitor  Elevated troponin - Likely demand ischemia, Keep On Telemetry   Hypovolemic Hypernatremia 164>> improving   Dysphagia + Dementia + Bedbound + stage 4 sacral wound   Gram Positive Bacteriemia  Hypothermia   Hyperkalemia       PLAN   -Family Rescinded DNI/DNR status today given COVID and PNA is possibly treatable    -Continue Levophed for Pressure support,  keep MAP above 65   -Continue with NRB, can transition to BIPAP >>intubation if warranted   -ID consult appreciated continue cefepime 1g q24  -Vancomycin  dose by random levels>>currently 15.8  -Continue Remdesivir and Dexamethasone along with PPI  -Keep NPO pending speech and swallow eval  -Nutrition eval as well may need NGT  -Kidney Function improving continue IV fluids   -Palliative consult appreciated >>will re-consult given pt is now full code   - HCP Mora Fernando [phone] ([place]  can call SON DAY: [phone]  -DVT PPX// GI PPX: HSQ and PPI   -Full Code   -Grave Prognosis            74F with severe dementa bedbound aox0 presents by ambulance for increased work of breathing, cough, and decreased responsiveness at home.      Assessment   Septic shock on Vasopressors   Acute Hypoxic Respiratory Failure >>NRB   Possible Aspiration pneumonia, Multifocal     COVID -19 Infection   severe Lactic Acidosis from Infection, improving   Acute Kidney Injury, likely Pre-renal >> unknown baseline, Improving    Mild Transaminitis Likely from Severe Infection >>monitor  Elevated troponin - Likely demand ischemia, Keep On Telemetry   Hypovolemic Hypernatremia 164>> improving   Dysphagia + Dementia + Bedbound + stage 4 sacral wound   Gram Positive Bacteriemia  Hypothermia   Hyperkalemia       PLAN   -Family Rescinded DNI/DNR status today given COVID and PNA is possibly treatable    -Continue Levophed for Pressure support,  keep MAP above 65   -Continue with NRB, can transition to BIPAP >>intubation if warranted   -ID consult appreciated continue cefepime 1g q24  -Vancomycin  dose by random levels>>currently 15.8  -Continue Remdesivir and Dexamethasone along with PPI  -Keep NPO pending speech and swallow eval  -Nutrition eval as well may need NGT  -Kidney Function improving continue IV fluids:  Sodium goal today not less than 147  -Palliative consult appreciated >>will re-consult given pt is now full code   - HCP Mora Fernando [phone] ([place]  can call SON DAY: [phone]   -Full Code, however not appropriate for ICU admission given comorbidities  - -DVT PPX// GI PPX: HSQ and PPI  -Grave Prognosis            74F with severe dementa bedbound aox0 presents by ambulance for increased work of breathing, cough, and decreased responsiveness at home.      Assessment   Septic shock on Vasopressors   Acute Hypoxic Respiratory Failure >>NRB   Possible Aspiration pneumonia, Multifocal     COVID -19 Infection   severe Lactic Acidosis from Infection, improving   Acute Kidney Injury, likely Pre-renal >> unknown baseline, Improving    Mild Transaminitis Likely from Severe Infection >>monitor  Elevated troponin - Likely demand ischemia, Keep On Telemetry   Hypovolemic Hypernatremia 164>> improving   Dysphagia + Dementia + Bedbound + stage 4 sacral wound   Gram Positive Bacteriemia  Hypothermia   Hyperkalemia       PLAN   -Family Rescinded DNI status today given COVID and PNA is possibly treatable  >>Do not resuscitate only  -Continue Levophed for Pressure support,  keep MAP above 65   -Continue with NRB, can transition to BIPAP >>intubation if warranted   -ID consult appreciated continue cefepime 1g q24  -Vancomycin  dose by random levels>>currently 15.8  -Continue Remdesivir and Dexamethasone along with PPI  -Keep NPO pending speech and swallow eval  -Nutrition eval as well may need NGT  -Kidney Function improving continue IV fluids:  Sodium goal today not less than 147  -Nephro consult appreciated>>fluids changed to D5 only, monitor Q6   -Palliative consult appreciated >>will re-consult given pt is now full code   - HCP Mora [place]aprecious [phone] ([place]  can call SON DAY: [phone]   -Not  appropriate for ICU admission given comorbidities  - -DVT PPX// GI PPX: HSQ and PPI  -Grave Prognosis

## 2024-08-15 NOTE — CONSULT NOTE ADULT - SUBJECTIVE AND OBJECTIVE BOX
NEPHROLOGY CONSULTATION NOTE    PRISCILLA HERNANDEZ  74y  Female  MRN-[id number]    CC:   Patient is a 74y old  Female who presents with a chief complaint of septic shock (15 Aug 2024 10:32)      HPI:  74F with severe dementa bedbound aox0 presents by ambulance for increased work of breathing, cough, and decreased responsiveness at home. Patient lives with son and daughter in law who noticed her symptoms starting in the last 1-2 days. Daughter in law notes that patient appeared to have blue feet this morning, prompting her to call 911. Patient typically eat puree at home (spoon fed) and often coughs or chokes if fed too quickly or improperly positioned.   HCP is Mora Zapatacathyalisson who is currently in [place], phone number provided by daughter in law: [phone].  (14 Aug 2024 14:34)      PAST MEDICAL & SURGICAL HISTORY:  Dementia        Allergies:  ciprofloxacin (Unknown)  sulfa drugs (Unknown)  erythromycin (Unknown)  penicillin (Unknown)  penicillins (Unknown)    Home Medications Reviewed  Hospital Medications:   MEDICATIONS  (STANDING):  acetaminophen  Suppository .. 650 milliGRAM(s) Rectal once  acetylcysteine 20%  Inhalation 4 milliLiter(s) Inhalation every 6 hours  albuterol/ipratropium for Nebulization 3 milliLiter(s) Nebulizer every 6 hours  cefepime   IVPB 1000 milliGRAM(s) IV Intermittent every 24 hours  chlorhexidine 2% Cloths 1 Application(s) Topical <User Schedule>  clotrimazole 1% Cream 1 Application(s) Topical every 12 hours  dexAMETHasone  Injectable 6 milliGRAM(s) IV Push daily  dextrose 5% + sodium chloride 0.45%. 1000 milliLiter(s) (75 mL/Hr) IV Continuous <Continuous>  dextrose 5%. 1000 milliLiter(s) (100 mL/Hr) IV Continuous <Continuous>  heparin   Injectable 5000 Unit(s) SubCutaneous every 12 hours  midodrine 10 milliGRAM(s) Oral every 8 hours  norepinephrine Infusion 0.05 MICROgram(s)/kG/Min (3.4 mL/Hr) IV Continuous <Continuous>  pantoprazole  Injectable 40 milliGRAM(s) IV Push daily  remdesivir  IVPB   IV Intermittent   sodium chloride 0.9% Bolus 500 milliLiter(s) IV Bolus once    MEDICATIONS  (PRN):    Home Medications:      SOCIAL HISTORY:  Social History:  24 hour care   lices with son and daughter in law   HCP is daughter Mora (14 Aug 2024 14:34)      FAMILY HISTORY:      REVIEW OF SYSTEMS:  All other review of systems is negative unless indicated above.    VITALS:  T(F): 98.8 (08-15-24 @ 05:43), Max: 100.9 (08-15-24 @ 05:05)  HR: 61 (08-15-24 @ 11:31)  BP: 105/61 (08-15-24 @ 11:31)  RR: 24 (08-15-24 @ 11:31)  SpO2: 99% (08-15-24 @ 11:31)      I&O's Detail    14 Aug 2024 07:01  -  15 Aug 2024 07:00  --------------------------------------------------------  IN:  Total IN: 0 mL    OUT:    Indwelling Catheter - Urethral (mL): 400 mL    Voided (mL): 250 mL  Total OUT: 650 mL    Total NET: -650 mL      15 Aug 2024 07:01  -  15 Aug 2024 13:29  --------------------------------------------------------  IN:    Norepinephrine: 6.8 mL  Total IN: 6.8 mL    OUT:  Total OUT: 0 mL    Total NET: 6.8 mL            I&O's Summary    14 Aug 2024 07:01  -  15 Aug 2024 07:00  --------------------------------------------------------  IN: 0 mL / OUT: 650 mL / NET: -650 mL    15 Aug 2024 07:01  -  15 Aug 2024 13:29  --------------------------------------------------------  IN: 6.8 mL / OUT: 0 mL / NET: 6.8 mL        PHYSICAL EXAM:  Gen: ill appearing on NRM  Chest: b/l breath sounds  Abd: soft  ext: no edema    LABS:    Lactate, Blood: 4.1 mmol/L (08-14-24 @ 21:50)  Lactate, Blood: 5.3 mmol/L (08-14-24 @ 15:27)  Blood Gas Profile w/Lytes - Venous: Performed in Lab (08-14-24 @ 12:53)    08-15    155<H>  |  122<H>  |  92<HH>  ----------------------------<  132<H>  3.5   |  17  |  1.7<H>    Ca    7.8<L>      15 Aug 2024 10:40  Mg     2.5     08-15    TPro  5.6<L>  /  Alb  2.9<L>  /  TBili  1.0  /  DBili      /  AST  51<H>  /  ALT  54<H>  /  AlkPhos  106  08-14      Creatinine Trend:   Creatinine: 1.7 mg/dL (08-15-24 @ 10:40)  Creatinine: 2.2 mg/dL (08-14-24 @ 21:50)  Creatinine: 2.5 mg/dL (08-14-24 @ 15:27)  Creatinine: 3.4 mg/dL (08-14-24 @ 08:15)                            11.9   11.81 )-----------( 213      ( 15 Aug 2024 10:40 )             37.8     Mean Cell Volume: 95.0 fL (08-15-24 @ 10:40)    Urine Studies:  Protein, Urine: Negative mg/dL (08-14-24 @ 08:40)  White Blood Cell - Urine: 2 /HPF (08-14-24 @ 08:40)  Red Blood Cell - Urine: 2 /HPF (08-14-24 @ 08:40)              RADIOLOGY & ADDITIONAL STUDIES:        Xray Chest 1 View- PORTABLE-Urgent:   ACC: [id number] EXAM:  XR CHEST PORTABLE URGENT 1V   ORDERED BY: JIGNA SCHMIDT     PROCEDURE DATE:  08/14/2024          INTERPRETATION:  Clinical History / Reason for exam: Shortness of breath    Comparison : Chest radiograph August 14, 2024 time8:58 AM.    Technique/Positioning: Frontal.    Findings:    Support devices: None.    Cardiac/mediastinum/hilum: Stable heart size..    Lung parenchyma/Pleura: Bibasilar opacities. Biapical calcified   granulomata. Decreased elevation of the right hemidiaphragm.    Skeleton/soft tissues: Stable degenerative changes. Dextroscoliosis.    Impression:    Bibasilar opacities, increased...        --- End of Report ---            CHERELLE PEARCE MD; Attending Radiologist  This document has been electronically signed. Aug 15 2024  5:06AM (08-14-24 @ 18:38)

## 2024-08-15 NOTE — CHART NOTE - NSCHARTNOTEFT_GEN_A_CORE
Lab Called:  Hgb  16.7   to   11.9 today  - Hypernatremia:  Volume contracted H/H; drop in Hgb due to IVF administration  - Will follow H/H for further drop with IVF infusion

## 2024-08-15 NOTE — PROGRESS NOTE ADULT - SUBJECTIVE AND OBJECTIVE BOX
INFECTIOUS DISEASE FOLLOW UP NOTE:    Interval History/ROS: Patient is a 74y old  Female who presents with a chief complaint of septic shock (14 Aug 2024 15:51)      Overnight events:    REVIEW OF SYSTEMS:        Prior hospital charts reviewed [Yes]  Primary team notes reviewed [Yes]  Other consultant notes reviewed [Yes]    Allergies:  ciprofloxacin (Unknown)  sulfa drugs (Unknown)  erythromycin (Unknown)  penicillin (Unknown)  penicillins (Unknown)      ANTIMICROBIALS:   cefepime   IVPB 1000 every 24 hours      OTHER MEDS: MEDICATIONS  (STANDING):  acetaminophen  Suppository .. 650 once  acetylcysteine 20%  Inhalation 4 every 6 hours  albuterol/ipratropium for Nebulization 3 every 6 hours  heparin   Injectable 5000 every 12 hours  midodrine 10 every 8 hours  norepinephrine Infusion 0.05 <Continuous>      Vital Signs Last 24 Hrs  T(F): 98.8 (08-15-24 @ 05:43), Max: 100.9 (08-15-24 @ 05:05)    Vital Signs Last 24 Hrs  HR: 97 (08-15-24 @ 07:59) (91 - 111)  BP: 86/20 (08-15-24 @ 07:59) (71/38 - 121/61)  RR: 20 (08-15-24 @ 07:59)  SpO2: 95% (08-15-24 @ 07:59) (89% - 100%)  Wt(kg): --    EXAM:      Labs:                        16.7   11.03 )-----------( 344      ( 14 Aug 2024 08:15 )             53.4     08-14    159<H>  |  122<H>  |  112<HH>  ----------------------------<  187<H>  3.6   |  20  |  2.2<H>    Ca    8.0<L>      14 Aug 2024 21:50    TPro  5.6<L>  /  Alb  2.9<L>  /  TBili  1.0  /  DBili  x   /  AST  51<H>  /  ALT  54<H>  /  AlkPhos  106  08-14      WBC Trend:  WBC Count: 11.03 (08-14-24 @ 08:15)      Creatine Trend:  Creatinine: 2.2 (08-14)  Creatinine: 2.5 (08-14)  Creatinine: 3.4 (08-14)      Liver Biochemical Testing Trend:  Alanine Aminotransferase (ALT/SGPT): 54 *H* (08-14)  Alanine Aminotransferase (ALT/SGPT): 70 *H* (08-14)  Aspartate Aminotransferase (AST/SGOT): 51 (08-14-24 @ 15:27)  Aspartate Aminotransferase (AST/SGOT): 59 (08-14-24 @ 08:15)  Bilirubin Total: 1.0 (08-14)  Bilirubin Total: 1.0 (08-14)      Trend LDH      Urinalysis Basic - ( 14 Aug 2024 21:50 )    Color: x / Appearance: x / SG: x / pH: x  Gluc: 187 mg/dL / Ketone: x  / Bili: x / Urobili: x   Blood: x / Protein: x / Nitrite: x   Leuk Esterase: x / RBC: x / WBC x   Sq Epi: x / Non Sq Epi: x / Bacteria: x        MICROBIOLOGY:    MRSA PCR Result.: Negative (08-14-24 @ 19:10)      Urinalysis with Rflx Culture (collected 14 Aug 2024 08:40)    Troponin T, High Sensitivity Result: 140 (08-14)  Troponin T, High Sensitivity Result: 142 (08-14)  Troponin T, High Sensitivity Result: 214 (08-14)    Lactate, Blood: 4.1 (08-14 @ 21:50)  Lactate, Blood: 5.3 (08-14 @ 15:27)  Blood Gas Venous - Lactate: 5.2 (08-14 @ 12:53)  Lactate, Blood: 6.8 (08-14 @ 10:20)      RADIOLOGY:  imaging below personally reviewed    < from: CT Chest No Cont (08.14.24 @ 16:25) >  IMPRESSION:    Intraluminal filling defect consistent with mucus plugging and/or debris   is noted within the right and left lower lobe bronchi.    Consolidation with air bronchograms consistent with lobar pneumonia in   the right and left lower lobes.    Indeterminate 8 mm circular calcification with lucent center that appears   to be within the right hemithorax adjacent to the right side of T7 and T8   vertebrae.    Fibrocalcific changes noted in both lung apices.    Wedge-shaped compression deformities of T3,T4 and T5 vertebrae with   exaggerated kyphosis of thoracic spine.    < end of copied text >    < from: CT Abdomen and Pelvis No Cont (08.14.24 @ 10:56) >  IMPRESSION:  Limited noncontrast study without IV and oral contrast.    Right basilar pleural effusion with adjacent atelectasis/consolidation.   Apparent occlusion of the right lower lobe bronchus causing atelectasis.   Mild left basilar atelectasis/consolidation. Infection is not excluded.   Consider interval follow-up chest CT with IV contrast for further   evaluation.    < end of copied text >     INFECTIOUS DISEASE FOLLOW UP NOTE:    Interval History/ROS: Patient is a 74y old  Female who presents with a chief complaint of septic shock (14 Aug 2024 15:51)    Overnight events: Hypoxemic and hypotensive. On low dose Levo. Satting well on NRB.    REVIEW OF SYSTEMS:  Unable to provide due to cognitive impairment    Prior hospital charts reviewed [Yes]  Primary team notes reviewed [Yes]  Other consultant notes reviewed [Yes]    Allergies:  ciprofloxacin (Unknown)  sulfa drugs (Unknown)  erythromycin (Unknown)  penicillin (Unknown)  penicillins (Unknown)      ANTIMICROBIALS:   cefepime   IVPB 1000 every 24 hours      OTHER MEDS: MEDICATIONS  (STANDING):  acetaminophen  Suppository .. 650 once  acetylcysteine 20%  Inhalation 4 every 6 hours  albuterol/ipratropium for Nebulization 3 every 6 hours  heparin   Injectable 5000 every 12 hours  midodrine 10 every 8 hours  norepinephrine Infusion 0.05 <Continuous>      Vital Signs Last 24 Hrs  T(F): 98.8 (08-15-24 @ 05:43), Max: 100.9 (08-15-24 @ 05:05)    Vital Signs Last 24 Hrs  HR: 97 (08-15-24 @ 07:59) (91 - 111)  BP: 86/20 (08-15-24 @ 07:59) (71/38 - 121/61)  RR: 20 (08-15-24 @ 07:59)  SpO2: 95% (08-15-24 @ 07:59) (89% - 100%)  Wt(kg): --    EXAM:  GENERAL: NAD, lying in bed. very frail and weak  HEAD: No head lesions  EYES: Conjunctiva pink and cornea white  EAR, NOSE, MOUTH, THROAT: Normal external ears and nose, no discharges; dry mucous membranes; + NRB  NECK: Supple, no JVD  RESPIRATORY: RLL rhonchi  CARDIOVASCULAR: S1 S2, tachycardia  GASTROINTESTINAL: Soft, nontender, nondistended; normoactive bowel sounds  GENITOURINARY: + trujillo catheter  EXTREMITIES: Severely contracted extremities  NERVOUS SYSTEM: Not responsive, not oriented, moans when manipulated  MUSCULOSKELETAL: No joint erythema, swelling  SKIN: Sacral ulcer stage 2, no purulence. no superficial thrombophlebitis  PSYCH: Lethargic    Labs:                        16.7   11.03 )-----------( 344      ( 14 Aug 2024 08:15 )             53.4     08-14    159<H>  |  122<H>  |  112<HH>  ----------------------------<  187<H>  3.6   |  20  |  2.2<H>    Ca    8.0<L>      14 Aug 2024 21:50    TPro  5.6<L>  /  Alb  2.9<L>  /  TBili  1.0  /  DBili  x   /  AST  51<H>  /  ALT  54<H>  /  AlkPhos  106  08-14      WBC Trend:  WBC Count: 11.03 (08-14-24 @ 08:15)      Creatine Trend:  Creatinine: 2.2 (08-14)  Creatinine: 2.5 (08-14)  Creatinine: 3.4 (08-14)      Liver Biochemical Testing Trend:  Alanine Aminotransferase (ALT/SGPT): 54 *H* (08-14)  Alanine Aminotransferase (ALT/SGPT): 70 *H* (08-14)  Aspartate Aminotransferase (AST/SGOT): 51 (08-14-24 @ 15:27)  Aspartate Aminotransferase (AST/SGOT): 59 (08-14-24 @ 08:15)  Bilirubin Total: 1.0 (08-14)  Bilirubin Total: 1.0 (08-14)      Trend LDH      Urinalysis Basic - ( 14 Aug 2024 21:50 )    Color: x / Appearance: x / SG: x / pH: x  Gluc: 187 mg/dL / Ketone: x  / Bili: x / Urobili: x   Blood: x / Protein: x / Nitrite: x   Leuk Esterase: x / RBC: x / WBC x   Sq Epi: x / Non Sq Epi: x / Bacteria: x        MICROBIOLOGY:    MRSA PCR Result.: Negative (08-14-24 @ 19:10)      Urinalysis with Rflx Culture (collected 14 Aug 2024 08:40)    Troponin T, High Sensitivity Result: 140 (08-14)  Troponin T, High Sensitivity Result: 142 (08-14)  Troponin T, High Sensitivity Result: 214 (08-14)    Lactate, Blood: 4.1 (08-14 @ 21:50)  Lactate, Blood: 5.3 (08-14 @ 15:27)  Blood Gas Venous - Lactate: 5.2 (08-14 @ 12:53)  Lactate, Blood: 6.8 (08-14 @ 10:20)      RADIOLOGY:  imaging below personally reviewed    < from: CT Chest No Cont (08.14.24 @ 16:25) >  IMPRESSION:    Intraluminal filling defect consistent with mucus plugging and/or debris   is noted within the right and left lower lobe bronchi.    Consolidation with air bronchograms consistent with lobar pneumonia in   the right and left lower lobes.    Indeterminate 8 mm circular calcification with lucent center that appears   to be within the right hemithorax adjacent to the right side of T7 and T8   vertebrae.    Fibrocalcific changes noted in both lung apices.    Wedge-shaped compression deformities of T3,T4 and T5 vertebrae with   exaggerated kyphosis of thoracic spine.    < end of copied text >    < from: CT Abdomen and Pelvis No Cont (08.14.24 @ 10:56) >  IMPRESSION:  Limited noncontrast study without IV and oral contrast.    Right basilar pleural effusion with adjacent atelectasis/consolidation.   Apparent occlusion of the right lower lobe bronchus causing atelectasis.   Mild left basilar atelectasis/consolidation. Infection is not excluded.   Consider interval follow-up chest CT with IV contrast for further   evaluation.    < end of copied text >

## 2024-08-15 NOTE — CONSULT NOTE ADULT - SUBJECTIVE AND OBJECTIVE BOX
HPI:   Patient is a 74F with severe dementia bedbound aox0 presents by ambulance for increased work of breathing, cough, and decreased responsiveness at home. Patient lives with son and daughter in law who noticed her symptoms starting in the last 1-2 days. Daughter in law notes that patient appeared to have blue feet this morning, prompting her to call 911. Patient typically eat puree at home (spoon fed) and often coughs or chokes if fed too quickly or improperly positioned.   HCP is Mora Fernando who is currently in [place], phone number provided by daughter in law: [phone].     PAST MEDICAL & SURGICAL HISTORY:  Dementia          REVIEW OF SYSTEMS: Pt unable to offer  General/ Breast/ Skin/ Neuro/ MSK: see HPI  All other systems negative    MEDICATIONS  (STANDING):  acetaminophen  Suppository .. 650 milliGRAM(s) Rectal once  acetylcysteine 20%  Inhalation 4 milliLiter(s) Inhalation every 6 hours  albuterol/ipratropium for Nebulization 3 milliLiter(s) Nebulizer every 6 hours  cefepime   IVPB 1000 milliGRAM(s) IV Intermittent every 24 hours  chlorhexidine 2% Cloths 1 Application(s) Topical <User Schedule>  clotrimazole 1% Cream 1 Application(s) Topical every 12 hours  dextrose 5% + sodium chloride 0.45%. 1000 milliLiter(s) (75 mL/Hr) IV Continuous <Continuous>  dextrose 5%. 1000 milliLiter(s) (100 mL/Hr) IV Continuous <Continuous>  heparin   Injectable 5000 Unit(s) SubCutaneous every 12 hours  midodrine 10 milliGRAM(s) Oral every 8 hours  norepinephrine Infusion 0.05 MICROgram(s)/kG/Min (3.4 mL/Hr) IV Continuous <Continuous>  sodium chloride 0.9% Bolus 500 milliLiter(s) IV Bolus once    MEDICATIONS  (PRN):      Allergies    ciprofloxacin (Unknown)  sulfa drugs (Unknown)  erythromycin (Unknown)  penicillin (Unknown)  penicillins (Unknown)    Intolerances        SOCIAL HISTORY:   From home     FAMILY HISTORY:       PHYSICAL EXAM:  Vital Signs Last 24 Hrs  T(C): 37.1 (15 Aug 2024 05:43), Max: 38.3 (15 Aug 2024 05:05)  T(F): 98.8 (15 Aug 2024 05:43), Max: 100.9 (15 Aug 2024 05:05)  HR: 97 (15 Aug 2024 07:59) (91 - 111)  BP: 86/20 (15 Aug 2024 07:59) (71/38 - 121/61)  BP(mean): --  RR: 20 (15 Aug 2024 07:59) (16 - 22)  SpO2: 95% (15 Aug 2024 07:59) (89% - 100%)    Parameters below as of 15 Aug 2024 07:59  Patient On (Oxygen Delivery Method): mask, nonrebreather  O2 Flow (L/min): 15      General : NAD    Respiratory: O2 100% via non rebreather   Gastrointestinal : Soft NT/ND (+)BS, Incontinence   Neurology:  Weakened strength & sensation   Psych: Calm  Musculoskeletal:  limited, contracted    Vascular: B/ L LE equally  cool,  Skin:  moist w/ good turgor      LABS/ CULTURES/ RADIOLOGY:                        16.7   11.03 )-----------( 344      ( 14 Aug 2024 08:15 )             53.4       159  |  122  |  112  ----------------------------<  187      [08-14-24 @ 21:50]  3.6   |  20  |  2.2        Ca     8.0     [08-14-24 @ 21:50]    TPro  5.6  /  Alb  2.9  /  TBili  1.0  /  DBili  x   /  AST  51  /  ALT  54  /  AlkPhos  106  [08-14-24 @ 15:27]    PT/INR: PT 20.50, INR 1.79       [08-14-24 @ 08:15]  PTT: 35.9       [08-14-24 @ 08:15]

## 2024-08-15 NOTE — PROGRESS NOTE ADULT - ASSESSMENT
74F with severe dementa bedbound aox0 presents by ambulance for increased work of breathing, cough, and decreased responsiveness at home.    ID is consulted for sepsis  Hypothermia to 94.5, WBC 11.03  Hypoxemia requiring NRB  Lactate 6.8 > 4.1  BCx pending  UA WBC 2  COVID +    CXR with Bibasilar opacities  CT A/P wo contrast 8/14  Right basilar pleural effusion with adjacent atelectasis/consolidation.   Apparent occlusion of the right lower lobe bronchus causing atelectasis.   Mild left basilar atelectasis/consolidation. Infection is not excluded.   Consider interval follow-up chest CT with IV contrast for further   evaluation.    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 FINAL RECOMMENDATION IS PENDING *   74F with severe dementa bedbound aox0 presents by ambulance for increased work of breathing, cough, and decreased responsiveness at home.    ID is consulted for sepsis  Hypothermia to 94.5, WBC 11.03  Hypoxemia requiring NRB  Lactate 6.8 > 4.1  BCx 8/14 CoNS, likely procurement contamination  UA WBC 2  COVID +    CXR with Bibasilar opacities  CT A/P wo contrast 8/14  Right basilar pleural effusion with adjacent atelectasis/consolidation.   Apparent occlusion of the right lower lobe bronchus causing atelectasis.   Mild left basilar atelectasis/consolidation. Infection is not excluded.   Consider interval follow-up chest CT with IV contrast for further   evaluation.    As per daughter-in-law at bedside, she potentially could have aspirated from feeding  Has a lot of congestion in the past 24-48 hrs    Antibiotics:  Cefepime 8/14 ->  Vancomycin 8/14      IMPRESSION:  RLL pneumonia  Severe COVID  Severe sepsis  Acute hypoxemic respiratory failure  DEIRDRE  Lactic acidosis  Sacral ulcer, no sign of infection  Intertrigo  Constipation  Bedbound  Dementia  Immunosuppression / Immunosenescence secondary to multiple comorbidities which could result in poor clinical outcome      RECOMMENDATIONS:  - Continue IV cefepime 1g q24hrs (CrCl <10) for now  - No need for vancomycin  - Start IV RDV x 5 days, closely monitor LFTs; start IV Decadron 6mg q24hrs x 10 days  - Start topical clotrimazole  - Bowel regimen  - Offloading and frequent position changes, aspiration precaution  - Trend WBC, fever curve, transaminases, creatinine daily  - Poor prognosis      Imani Morrow D.O.  Attending Physician  Division of Infectious Diseases  [facility]  Please contact me via [employer] Teams

## 2024-08-15 NOTE — PROGRESS NOTE ADULT - SUBJECTIVE AND OBJECTIVE BOX
Patient is a 74y old  Female who presents with a chief complaint of septic shock (15 Aug 2024 13:29)      MEDICATIONS  (STANDING):  acetaminophen  Suppository .. 650 milliGRAM(s) Rectal once  acetylcysteine 20%  Inhalation 4 milliLiter(s) Inhalation every 6 hours  albuterol/ipratropium for Nebulization 3 milliLiter(s) Nebulizer every 6 hours  cefepime   IVPB 1000 milliGRAM(s) IV Intermittent every 24 hours  chlorhexidine 2% Cloths 1 Application(s) Topical <User Schedule>  clotrimazole 1% Cream 1 Application(s) Topical every 12 hours  dexAMETHasone  Injectable 6 milliGRAM(s) IV Push daily  dextrose 5% + sodium chloride 0.45%. 1000 milliLiter(s) (75 mL/Hr) IV Continuous <Continuous>  dextrose 5%. 1000 milliLiter(s) (100 mL/Hr) IV Continuous <Continuous>  heparin   Injectable 5000 Unit(s) SubCutaneous every 12 hours  midodrine 10 milliGRAM(s) Oral every 8 hours  norepinephrine Infusion 0.05 MICROgram(s)/kG/Min (3.4 mL/Hr) IV Continuous <Continuous>  pantoprazole  Injectable 40 milliGRAM(s) IV Push daily  remdesivir  IVPB   IV Intermittent   sodium chloride 0.9% Bolus 500 milliLiter(s) IV Bolus once    MEDICATIONS  (PRN):  CAPILLARY BLOOD GLUCOSE    POCT Blood Glucose.: 108 mg/dL (15 Aug 2024 10:45)  POCT Blood Glucose.: 158 mg/dL (15 Aug 2024 04:38)    I&O's Summary    14 Aug 2024 07:01  -  15 Aug 2024 07:00  --------------------------------------------------------  IN: 0 mL / OUT: 650 mL / NET: -650 mL    15 Aug 2024 07:01  -  15 Aug 2024 14:50  --------------------------------------------------------  IN: 6.8 mL / OUT: 275 mL / NET: -268.2 mL      PHYSICAL EXAM:  Vital Signs Last 24 Hrs  T(C): 37.2 (15 Aug 2024 13:55), Max: 38.3 (15 Aug 2024 05:05)  T(F): 99 (15 Aug 2024 13:55), Max: 100.9 (15 Aug 2024 05:05)  HR: 94 (15 Aug 2024 13:55) (61 - 111)  BP: 90/56 (15 Aug 2024 13:55) (60/39 - 105/61)  BP(mean): 74 (15 Aug 2024 11:31) (46 - 74)  RR: 20 (15 Aug 2024 13:55) (16 - 24)  SpO2: 100% (15 Aug 2024 13:55) (92% - 100%)    Parameters below as of 15 Aug 2024 13:55  Patient On (Oxygen Delivery Method): mask, nonrebreather  O2 Flow (L/min): 15      GENERAL: No acute distress, Appears Chronically ill   CHEST/LUNG: B/L Rhonchi with Congestion worsened by Severe Kyphosis   HEART: Regular rate and rhythm;  ABDOMEN: Soft, non-tender, non-distended; normal bowel sounds  EXTREMITIES: Contracetd B/L  No clubbing, cyanosis, or edema  NEUROLOGY: A&O x 0 no focal deficits      LABS:                        11.9   11.81 )-----------( 213      ( 15 Aug 2024 10:40 )             37.8     08-15    155<H>  |  122<H>  |  92<HH>  ----------------------------<  132<H>  3.5   |  17  |  1.7<H>    Ca    7.8<L>      15 Aug 2024 10:40  Mg     2.5     08-15    TPro  5.6<L>  /  Alb  2.9<L>  /  TBili  1.0  /  DBili  x   /  AST  51<H>  /  ALT  54<H>  /  AlkPhos  106  08-14    PT/INR - ( 14 Aug 2024 08:15 )   PT: 20.50 sec;   INR: 1.79 ratio         PTT - ( 14 Aug 2024 08:15 )  PTT:35.9 sec      Urinalysis Basic - ( 15 Aug 2024 10:40 )    Color: x / Appearance: x / SG: x / pH: x  Gluc: 132 mg/dL / Ketone: x  / Bili: x / Urobili: x   Blood: x / Protein: x / Nitrite: x   Leuk Esterase: x / RBC: x / WBC x   Sq Epi: x / Non Sq Epi: x / Bacteria:       Urinalysis with Rflx Culture (collected 14 Aug 2024 08:40)    Culture - Blood (collected 14 Aug 2024 08:30)  Source: .Blood Blood-Peripheral  Gram Stain (15 Aug 2024 11:46):    Growth in aerobic bottle: Gram Positive Cocci in Clusters  Preliminary Report (15 Aug 2024 11:46):    Growth in aerobic bottle: Gram Positive Cocci in Clusters    Direct identification is available within approximately 3-5    hours either by Blood Panel Multiplexed PCR or Direct    MALDI-TOF. Details: [email or web address]  Organism: Blood Culture PCR (15 Aug 2024 13:44)  Organism: Blood Culture PCR (15 Aug 2024 13:44)    Culture - Blood (collected 14 Aug 2024 08:30)  Source: .Blood Blood-Peripheral  Gram Stain (15 Aug 2024 14:00):    Growth in anaerobic bottle: Gram Positive Cocci in Clusters  Preliminary Report (15 Aug 2024 14:00):    Growth in anaerobic bottle: Gram Positive Cocci in Clusters

## 2024-08-15 NOTE — CHART NOTE - NSCHARTNOTEFT_GEN_A_CORE
Informed by several staff members including day hospitalist, nursing team members and overnight intensivist that despite a rapid response called earlier and use of Levophed requiring frequent dose changes, patient is not being accepted to a critical care bed Informed by several staff members including day hospitalist, nursing team members and overnight intensivist that despite a rapid response called earlier and use of Levophed requiring frequent dose changes, patient was not accepted to a critical care bed

## 2024-08-15 NOTE — CONSULT NOTE ADULT - SUBJECTIVE AND OBJECTIVE BOX
Patient is a 74y old  Female who presents with a chief complaint of septic shock (15 Aug 2024 14:50)      HPI:  74F with severe dementa bedbound aox0 presents by ambulance for increased work of breathing, cough, and decreased responsiveness at home. Patient lives with son and daughter in law who noticed her symptoms starting in the last 1-2 days. Daughter in law notes that patient appeared to have blue feet this morning, prompting her to call 911. Patient typically eat puree at home (spoon fed) and often coughs or chokes if fed too quickly or improperly positioned.   HCP is Mora Fernando who is currently in [place], phone number provided by daughter in law: [phone].  (14 Aug 2024 14:34)      PAST MEDICAL & SURGICAL HISTORY:  Dementia              FAMILY HISTORY:  :  No known cardiovacular family hisotry     ROS:  See HPI     Allergies    ciprofloxacin (Unknown)  sulfa drugs (Unknown)  erythromycin (Unknown)  penicillin (Unknown)  penicillins (Unknown)    Intolerances          PHYSICAL EXAM    ICU Vital Signs Last 24 Hrs  T(C): 37.2 (15 Aug 2024 13:55), Max: 38.3 (15 Aug 2024 05:05)  T(F): 99 (15 Aug 2024 13:55), Max: 100.9 (15 Aug 2024 05:05)  HR: 71 (15 Aug 2024 16:28) (61 - 105)  BP: 88/28 (15 Aug 2024 19:38) (60/39 - 141/63)  BP(mean): 48 (15 Aug 2024 19:38) (46 - 103)  ABP: --  ABP(mean): --  RR: 20 (15 Aug 2024 13:55) (16 - 24)  SpO2: 100% (15 Aug 2024 13:55) (95% - 100%)    O2 Parameters below as of 15 Aug 2024 13:55  Patient On (Oxygen Delivery Method): mask, nonrebreather  O2 Flow (L/min): 15          General: NAD  HEENT:  DORA              Lymphatic system: No cervical LN   Lungs: Bilateral BS, coarse   Cardiovascular: Regular  Gastrointestinal: Soft, Positive BS  Musculoskeletal: No clubbing.  No movement   Skin: Warm.  Intact  Neurological: unresponsive       08-14-24 @ 07:01  -  08-15-24 @ 07:00  --------------------------------------------------------  IN:  Total IN: 0 mL    OUT:    Indwelling Catheter - Urethral (mL): 400 mL    Voided (mL): 250 mL  Total OUT: 650 mL    Total NET: -650 mL      08-15-24 @ 07:01  -  08-15-24 @ 20:12  --------------------------------------------------------  IN:    Norepinephrine: 6.8 mL  Total IN: 6.8 mL    OUT:    Indwelling Catheter - Urethral (mL): 275 mL  Total OUT: 275 mL    Total NET: -268.2 mL          LABS:                          12.1   12.59 )-----------( 191      ( 15 Aug 2024 15:59 )             38.3                                               08-15    153<H>  |  121<H>  |  86<HH>  ----------------------------<  156<H>  3.5   |  14<L>  |  1.6<H>    Ca    7.8<L>      15 Aug 2024 15:59  Mg     2.5     08-15    TPro  5.6<L>  /  Alb  2.9<L>  /  TBili  1.0  /  DBili  x   /  AST  51<H>  /  ALT  54<H>  /  AlkPhos  106  08-14      PT/INR - ( 14 Aug 2024 08:15 )   PT: 20.50 sec;   INR: 1.79 ratio         PTT - ( 14 Aug 2024 08:15 )  PTT:35.9 sec                                       Urinalysis Basic - ( 15 Aug 2024 15:59 )    Color: x / Appearance: x / SG: x / pH: x  Gluc: 156 mg/dL / Ketone: x  / Bili: x / Urobili: x   Blood: x / Protein: x / Nitrite: x   Leuk Esterase: x / RBC: x / WBC x   Sq Epi: x / Non Sq Epi: x / Bacteria: x                                                  LIVER FUNCTIONS - ( 14 Aug 2024 15:27 )  Alb: 2.9 g/dL / Pro: 5.6 g/dL / ALK PHOS: 106 U/L / ALT: 54 U/L / AST: 51 U/L / GGT: x                                                  Urinalysis with Rflx Culture (collected 14 Aug 2024 08:40)    Culture - Blood (collected 14 Aug 2024 08:30)  Source: .Blood Blood-Peripheral  Gram Stain (15 Aug 2024 11:46):    Growth in aerobic bottle: Gram Positive Cocci in Clusters  Preliminary Report (15 Aug 2024 11:46):    Growth in aerobic bottle: Gram Positive Cocci in Clusters    Direct identification is available within approximately 3-5    hours either by Blood Panel Multiplexed PCR or Direct    MALDI-TOF. Details: [email or web address]  Organism: Blood Culture PCR (15 Aug 2024 13:44)  Organism: Blood Culture PCR (15 Aug 2024 13:44)    Culture - Blood (collected 14 Aug 2024 08:30)  Source: .Blood Blood-Peripheral  Gram Stain (15 Aug 2024 14:00):    Growth in anaerobic bottle: Gram Positive Cocci in Clusters  Preliminary Report (15 Aug 2024 14:00):    Growth in anaerobic bottle: Gram Positive Cocci in Clusters                                                                                           X-Rays                                                                                     ECHO    MEDICATIONS  (STANDING):  acetaminophen  Suppository .. 650 milliGRAM(s) Rectal once  acetylcysteine 20%  Inhalation 4 milliLiter(s) Inhalation every 6 hours  albuterol/ipratropium for Nebulization 3 milliLiter(s) Nebulizer every 6 hours  cefepime   IVPB 1000 milliGRAM(s) IV Intermittent every 24 hours  chlorhexidine 2% Cloths 1 Application(s) Topical <User Schedule>  clotrimazole 1% Cream 1 Application(s) Topical every 12 hours  dexAMETHasone  Injectable 6 milliGRAM(s) IV Push daily  dextrose 5%. 1000 milliLiter(s) (75 mL/Hr) IV Continuous <Continuous>  heparin   Injectable 5000 Unit(s) SubCutaneous every 12 hours  midodrine 10 milliGRAM(s) Oral every 8 hours  norepinephrine Infusion 0.07 MICROgram(s)/kG/Min (4.76 mL/Hr) IV Continuous <Continuous>  pantoprazole  Injectable 40 milliGRAM(s) IV Push daily  remdesivir  IVPB   IV Intermittent     MEDICATIONS  (PRN):

## 2024-08-15 NOTE — CONSULT NOTE ADULT - ASSESSMENT
DEIRDRE d/t hypotension, sepsis improving with volume resuscitation  Severe COVID19 PNA, septic shock  - change iv fluids to d5w  - add sodium bicarb op 1300mg q8h (if pt can tolerate po meds)  - f/u cultures / cont abx per ID recs, on cefepime, remdesivir, dose vanc by level  - cont midodrine and levophed as needed for BP support   - check phos, CPK  - poor prognosis / GOC ongoing

## 2024-08-15 NOTE — GOALS OF CARE CONVERSATION - ADVANCED CARE PLANNING - CONVERSATION DETAILS
Discussed with patient's son, [place] Discussed code status, and they are opting for DNR, but would like intubation if needed, given she is acutely ill from sepsis and COVID. Agreed to place DNR order. Discussed with patient's son, as [place]. Discussed code status, and they are opting for DNR, but would like intubation if needed, given she is acutely ill from sepsis and COVID. Agreed to place DNR order, will d/w primary team to complete MOLST

## 2024-08-15 NOTE — CONSULT NOTE ADULT - ASSESSMENT
Impression:     Sepsis  Bacteremia   AMS   DEIRDRE   Hypernatremia from dehydration   Advanced dementia   Dysphagia   Aspiration pneumonia   COVID positive       PLAN:    CNS: AMS, dementia is advanced at baseline.     HEENT: Oral care    PULMONARY:  HOB @ 45 degrees. CXR with likely aspiration pneumonia. NRB facemask for now. ABG needed if family wants everything done. Low threshold to intubate.     CARDIOVASCULAR: Needs maintenance fluids. Goal MAP more than 60mmhg. Levophed. Echo.     GI: GI prophylaxis.  Feeding: cannot tolerate PO. NGT.     RENAL:  Follow up lytes.  Correct as needed. DEIRDRE noted. No hydro on CT. Recommend bicarb drip at 50cc/hr. ABG.     INFECTIOUS DISEASE: Blood culture with gram positive cocci in clusters. Echo TTE. Vancomycin IV 1g once then per daily level. Continue with Cefepime. ID follow up.     HEMATOLOGICAL:  DVT prophylaxis. Duplex LEs.     ENDOCRINE:  Follow up FS.  Insulin protocol if needed.    MUSCULOSKELETAL: Bedbound at baseline.    Prognosis is dismal; comfort measures conversation should be revisited.     If family wants all measures done then transfer to ICU.

## 2024-08-15 NOTE — CONSULT NOTE ADULT - ASSESSMENT
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 )  Size: ~3x2  Tissue Description Yellow devitalised skin tissue , macerated   No odor, erythema, increased warmth, tenderness, induration, fluctuance, nor crepitus :   Wound Exudate : Scant with dressing removal    Wound Edge: Irregular   Periwound Condition : Macerated            Wound and skin care recs   Clean wound with soap and water, pat dry apply triad with Allevyn foam dressing for autolytic wound debridement   Pressure  injury  preventive  measures  Skin  and incontinence care   Assess wound and inform primary provider of any changes   Case discussed with primary Rn  Wound/ ostomy specialist  to f/u as needed     Offloading: [ x] Frequent position changes [x ] Devices/Equipment  Cleansing: [ ] Saline [ ] Soap/Water [ ] Other: ______  Topicals: [ ] Barrier Cream [ ] Antimicrobial [x ] autolytic  Wound Debridement [x] Moist  wound Healing   Dressings: [ ] Dry, sterile [ ] Allevyn  Foam [ ] Absorbant Pads [ ] Collagenase    Other Recs.   Per Primary team   Total time for bedside assessment  , review of medical records  and  discussion of plan of care with primary team greater than 35 min

## 2024-08-15 NOTE — CHART NOTE - NSCHARTNOTEFT_GEN_A_CORE
BP still low and patient is moaning. Will try Tylenol suppository, repeat bolus and contact family BP still low and patient is moaning. Will try Tylenol suppository, repeat bolus and contact family (I spoke to son, Sergio)

## 2024-08-15 NOTE — SWALLOW BEDSIDE ASSESSMENT ADULT - COMMENTS
Pt is currently on non-rebreather mask and lethargic. Not appropriate for PO intake. Pt is critically ill- I do not see her tolerating PO diet in the next few days. Pt is DNR/DNI. Rec: [facility] discussion w/palliative team re: NGT vs. CMO.

## 2024-08-16 ENCOUNTER — RESULT REVIEW (OUTPATIENT)
Age: 74
End: 2024-08-16

## 2024-08-16 NOTE — PROGRESS NOTE ADULT - SUBJECTIVE AND OBJECTIVE BOX
Patient is a 74y old  Female who presents with a chief complaint of septic shock (15 Aug 2024 20:11)        Over Night Events:  upgraded   no events this morning      ROS:     All ROS are negative except HPI         PHYSICAL EXAM    ICU Vital Signs Last 24 Hrs  T(C): 36.3 (16 Aug 2024 02:00), Max: 37.2 (15 Aug 2024 13:55)  T(F): 97.4 (16 Aug 2024 02:00), Max: 99 (15 Aug 2024 13:55)  HR: 54 (16 Aug 2024 07:47) (54 - 94)  BP: 134/64 (16 Aug 2024 07:00) (60/39 - 141/63)  BP(mean): 92 (16 Aug 2024 07:00) (46 - 103)  ABP: --  ABP(mean): --  RR: 23 (16 Aug 2024 07:10) (20 - 36)  SpO2: 100% (16 Aug 2024 07:47) (86% - 100%)    O2 Parameters below as of 16 Aug 2024 07:00  Patient On (Oxygen Delivery Method): BiPAP/CPAP    O2 Concentration (%): 100        CONSTITUTIONAL:  Well nourished.  NAD    ENT:   Airway patent,   Mouth with normal mucosa.   No thrush    EYES:   Pupils equal,   Round and reactive to light.    CARDIAC:   Normal rate,   Regular rhythm.    No edema      Vascular:  Normal systolic impulse  No Carotid bruits    RESPIRATORY:   No wheezing  Bilateral BS  Normal chest expansion  Not tachypneic,  No use of accessory muscles    GASTROINTESTINAL:  Abdomen soft,   Non-tender,   No guarding,   + BS    MUSCULOSKELETAL:   Range of motion is not limited,  No clubbing, cyanosis    NEUROLOGICAL:   Alert and oriented   No motor  deficits.    SKIN:   Skin normal color for race,   Warm and dry and intact.   No evidence of rash.    PSYCHIATRIC:   Normal mood and affect.   No apparent risk to self or others.    HEMATOLOGICAL:  No cervical  lymphadenopathy.  no inguinal lymphadenopathy      08-15-24 @ 07:01  -  08-16-24 @ 07:00  --------------------------------------------------------  IN:    Lactated Ringers: 250 mL    Norepinephrine: 6.8 mL    Norepinephrine: 102 mL  Total IN: 358.8 mL    OUT:    Indwelling Catheter - Urethral (mL): 853 mL  Total OUT: 853 mL    Total NET: -494.2 mL          LABS:                            12.1   x     )-----------( 223      ( 16 Aug 2024 06:30 )             38.2                                               08-16    153<H>  |  119<H>  |  73<HH>  ----------------------------<  180<H>  4.4   |  18  |  1.3    Ca    8.0<L>      16 Aug 2024 06:30  Phos  4.0     08-15  Mg     2.5     08-15    TPro  4.7<L>  /  Alb  2.3<L>  /  TBili  0.5  /  DBili  x   /  AST  24  /  ALT  32  /  AlkPhos  100  08-16      PT/INR - ( 14 Aug 2024 08:15 )   PT: 20.50 sec;   INR: 1.79 ratio         PTT - ( 14 Aug 2024 08:15 )  PTT:35.9 sec                                       Urinalysis Basic - ( 16 Aug 2024 06:30 )    Color: x / Appearance: x / SG: x / pH: x  Gluc: 180 mg/dL / Ketone: x  / Bili: x / Urobili: x   Blood: x / Protein: x / Nitrite: x   Leuk Esterase: x / RBC: x / WBC x   Sq Epi: x / Non Sq Epi: x / Bacteria: x                                                  LIVER FUNCTIONS - ( 16 Aug 2024 06:30 )  Alb: 2.3 g/dL / Pro: 4.7 g/dL / ALK PHOS: 100 U/L / ALT: 32 U/L / AST: 24 U/L / GGT: x                                                  Urinalysis with Rflx Culture (collected 14 Aug 2024 08:40)    Culture - Blood (collected 14 Aug 2024 08:30)  Source: .Blood Blood-Peripheral  Gram Stain (15 Aug 2024 11:46):    Growth in aerobic bottle: Gram Positive Cocci in Clusters  Preliminary Report (15 Aug 2024 11:46):    Growth in aerobic bottle: Gram Positive Cocci in Clusters    Direct identification is available within approximately 3-5    hours either by Blood Panel Multiplexed PCR or Direct    MALDI-TOF. Details: [email or web address]  Organism: Blood Culture PCR (15 Aug 2024 13:44)  Organism: Blood Culture PCR (15 Aug 2024 13:44)    Culture - Blood (collected 14 Aug 2024 08:30)  Source: .Blood Blood-Peripheral  Gram Stain (15 Aug 2024 14:00):    Growth in anaerobic bottle: Gram Positive Cocci in Clusters  Preliminary Report (15 Aug 2024 14:00):    Growth in anaerobic bottle: Gram Positive Cocci in Clusters                                                                                       ABG - ( 16 Aug 2024 04:12 )  pH, Arterial: 7.41  pH, Blood: x     /  pCO2: 27    /  pO2: 189   / HCO3: 17    / Base Excess: -6.2  /  SaO2: 99.0                MEDICATIONS  (STANDING):  acetaminophen  Suppository .. 650 milliGRAM(s) Rectal once  acetylcysteine 20%  Inhalation 4 milliLiter(s) Inhalation every 6 hours  albuterol/ipratropium for Nebulization 3 milliLiter(s) Nebulizer every 6 hours  cefepime   IVPB 1000 milliGRAM(s) IV Intermittent every 24 hours  chlorhexidine 2% Cloths 1 Application(s) Topical <User Schedule>  clotrimazole 1% Cream 1 Application(s) Topical every 12 hours  dexAMETHasone  Injectable 6 milliGRAM(s) IV Push daily  heparin   Injectable 5000 Unit(s) SubCutaneous every 12 hours  lactated ringers. 1000 milliLiter(s) (50 mL/Hr) IV Continuous <Continuous>  midodrine 10 milliGRAM(s) Oral every 8 hours  norepinephrine Infusion 0.15 MICROgram(s)/kG/Min (10.2 mL/Hr) IV Continuous <Continuous>  pantoprazole  Injectable 40 milliGRAM(s) IV Push daily  remdesivir  IVPB 100 milliGRAM(s) IV Intermittent every 24 hours  remdesivir  IVPB   IV Intermittent     MEDICATIONS  (PRN):      New X-rays reviewed:                                                                                  ECHO    CXR interpreted by me:       Patient is a 74y old  Female who presents with a chief complaint of septic shock (15 Aug 2024 20:11)  COVID, sepsis   then up graded from the floors        Over Night Events:  upgraded   no events this morning      ROS:     All ROS are negative except HPI         PHYSICAL EXAM    ICU Vital Signs Last 24 Hrs  T(C): 36.3 (16 Aug 2024 02:00), Max: 37.2 (15 Aug 2024 13:55)  T(F): 97.4 (16 Aug 2024 02:00), Max: 99 (15 Aug 2024 13:55)  HR: 54 (16 Aug 2024 07:47) (54 - 94)  BP: 134/64 (16 Aug 2024 07:00) (60/39 - 141/63)  BP(mean): 92 (16 Aug 2024 07:00) (46 - 103)  ABP: --  ABP(mean): --  RR: 23 (16 Aug 2024 07:10) (20 - 36)  SpO2: 100% (16 Aug 2024 07:47) (86% - 100%)    O2 Parameters below as of 16 Aug 2024 07:00  Patient On (Oxygen Delivery Method): BiPAP/CPAP    O2 Concentration (%): 100        CONSTITUTIONAL:  Well nourished.  NAD    ENT:   Airway patent,   Mouth with normal mucosa.   No thrush    EYES:   Pupils equal,   Round and reactive to light.    CARDIAC:   Normal rate,   Regular rhythm.    No edema      Vascular:  Normal systolic impulse  No Carotid bruits    RESPIRATORY:   No wheezing  Bilateral BS  Normal chest expansion  Not tachypneic,  No use of accessory muscles    GASTROINTESTINAL:  Abdomen soft,   Non-tender,   No guarding,   + BS    MUSCULOSKELETAL:   Range of motion is not limited,  No clubbing, cyanosis    NEUROLOGICAL:   Alert and oriented   No motor  deficits.    SKIN:   Skin normal color for race,   Warm and dry and intact.   No evidence of rash.    PSYCHIATRIC:   Normal mood and affect.   No apparent risk to self or others.    HEMATOLOGICAL:  No cervical  lymphadenopathy.  no inguinal lymphadenopathy      08-15-24 @ 07:01  -  08-16-24 @ 07:00  --------------------------------------------------------  IN:    Lactated Ringers: 250 mL    Norepinephrine: 6.8 mL    Norepinephrine: 102 mL  Total IN: 358.8 mL    OUT:    Indwelling Catheter - Urethral (mL): 853 mL  Total OUT: 853 mL    Total NET: -494.2 mL          LABS:                            12.1   x     )-----------( 223      ( 16 Aug 2024 06:30 )             38.2                                               08-16    153<H>  |  119<H>  |  73<HH>  ----------------------------<  180<H>  4.4   |  18  |  1.3    Ca    8.0<L>      16 Aug 2024 06:30  Phos  4.0     08-15  Mg     2.5     08-15    TPro  4.7<L>  /  Alb  2.3<L>  /  TBili  0.5  /  DBili  x   /  AST  24  /  ALT  32  /  AlkPhos  100  08-16      PT/INR - ( 14 Aug 2024 08:15 )   PT: 20.50 sec;   INR: 1.79 ratio         PTT - ( 14 Aug 2024 08:15 )  PTT:35.9 sec                                       Urinalysis Basic - ( 16 Aug 2024 06:30 )    Color: x / Appearance: x / SG: x / pH: x  Gluc: 180 mg/dL / Ketone: x  / Bili: x / Urobili: x   Blood: x / Protein: x / Nitrite: x   Leuk Esterase: x / RBC: x / WBC x   Sq Epi: x / Non Sq Epi: x / Bacteria: x                                                  LIVER FUNCTIONS - ( 16 Aug 2024 06:30 )  Alb: 2.3 g/dL / Pro: 4.7 g/dL / ALK PHOS: 100 U/L / ALT: 32 U/L / AST: 24 U/L / GGT: x                                                  Urinalysis with Rflx Culture (collected 14 Aug 2024 08:40)    Culture - Blood (collected 14 Aug 2024 08:30)  Source: .Blood Blood-Peripheral  Gram Stain (15 Aug 2024 11:46):    Growth in aerobic bottle: Gram Positive Cocci in Clusters  Preliminary Report (15 Aug 2024 11:46):    Growth in aerobic bottle: Gram Positive Cocci in Clusters    Direct identification is available within approximately 3-5    hours either by Blood Panel Multiplexed PCR or Direct    MALDI-TOF. Details: [email or web address]  Organism: Blood Culture PCR (15 Aug 2024 13:44)  Organism: Blood Culture PCR (15 Aug 2024 13:44)    Culture - Blood (collected 14 Aug 2024 08:30)  Source: .Blood Blood-Peripheral  Gram Stain (15 Aug 2024 14:00):    Growth in anaerobic bottle: Gram Positive Cocci in Clusters  Preliminary Report (15 Aug 2024 14:00):    Growth in anaerobic bottle: Gram Positive Cocci in Clusters                                                                                       ABG - ( 16 Aug 2024 04:12 )  pH, Arterial: 7.41  pH, Blood: x     /  pCO2: 27    /  pO2: 189   / HCO3: 17    / Base Excess: -6.2  /  SaO2: 99.0                MEDICATIONS  (STANDING):  acetaminophen  Suppository .. 650 milliGRAM(s) Rectal once  acetylcysteine 20%  Inhalation 4 milliLiter(s) Inhalation every 6 hours  albuterol/ipratropium for Nebulization 3 milliLiter(s) Nebulizer every 6 hours  cefepime   IVPB 1000 milliGRAM(s) IV Intermittent every 24 hours  chlorhexidine 2% Cloths 1 Application(s) Topical <User Schedule>  clotrimazole 1% Cream 1 Application(s) Topical every 12 hours  dexAMETHasone  Injectable 6 milliGRAM(s) IV Push daily  heparin   Injectable 5000 Unit(s) SubCutaneous every 12 hours  lactated ringers. 1000 milliLiter(s) (50 mL/Hr) IV Continuous <Continuous>  midodrine 10 milliGRAM(s) Oral every 8 hours  norepinephrine Infusion 0.15 MICROgram(s)/kG/Min (10.2 mL/Hr) IV Continuous <Continuous>  pantoprazole  Injectable 40 milliGRAM(s) IV Push daily  remdesivir  IVPB 100 milliGRAM(s) IV Intermittent every 24 hours  remdesivir  IVPB   IV Intermittent     MEDICATIONS  (PRN):      New X-rays reviewed:                                                                                  ECHO    CXR interpreted by me:

## 2024-08-16 NOTE — PROGRESS NOTE ADULT - SUBJECTIVE AND OBJECTIVE BOX
Nephrology Progress Note    PRISCILLA HERNANDEZ  [id number]  74y  Female    S:  Patient is seen and examined, events over the last 24h noted.    O:  Allergies:  ciprofloxacin (Unknown)  sulfa drugs (Unknown)  erythromycin (Unknown)  penicillin (Unknown)  penicillins (Unknown)    Hospital Medications:   MEDICATIONS  (STANDING):  acetaminophen  Suppository .. 650 milliGRAM(s) Rectal once  acetylcysteine 20%  Inhalation 4 milliLiter(s) Inhalation every 6 hours  albuterol/ipratropium for Nebulization 3 milliLiter(s) Nebulizer every 6 hours  cefepime   IVPB 1000 milliGRAM(s) IV Intermittent every 24 hours  chlorhexidine 2% Cloths 1 Application(s) Topical <User Schedule>  clotrimazole 1% Cream 1 Application(s) Topical every 12 hours  dexAMETHasone  Injectable 6 milliGRAM(s) IV Push daily  heparin   Injectable 5000 Unit(s) SubCutaneous every 12 hours  lactated ringers. 1000 milliLiter(s) (50 mL/Hr) IV Continuous <Continuous>  midodrine 10 milliGRAM(s) Oral every 8 hours  norepinephrine Infusion 0.15 MICROgram(s)/kG/Min (10.2 mL/Hr) IV Continuous <Continuous>  pantoprazole  Injectable 40 milliGRAM(s) IV Push daily  remdesivir  IVPB 100 milliGRAM(s) IV Intermittent every 24 hours  remdesivir  IVPB   IV Intermittent     MEDICATIONS  (PRN):    Home Medications:      VITALS:  Daily     Daily Weight in k.8 (16 Aug 2024 02:00)  T(F): 96.9 (24 @ 11:00), Max: 99 (08-15-24 @ 13:55)  HR: 60 (24 @ 11:00)  BP: 134/62 (24 @ 11:00)  RR: 24 (24 @ 11:00)  SpO2: 100% (24 @ 11:00)  Wt(kg): --  I&O's Detail    15 Aug 2024 07:01  -  16 Aug 2024 07:00  --------------------------------------------------------  IN:    Lactated Ringers: 250 mL    Norepinephrine: 6.8 mL    Norepinephrine: 102 mL  Total IN: 358.8 mL    OUT:    Indwelling Catheter - Urethral (mL): 820 mL  Total OUT: 820 mL    Total NET: -461.2 mL      16 Aug 2024 07:01  -  16 Aug 2024 12:43  --------------------------------------------------------  IN:  Total IN: 0 mL    OUT:    Indwelling Catheter - Urethral (mL): 190 mL  Total OUT: 190 mL    Total NET: -190 mL        I&O's Summary    15 Aug 2024 07:  -  16 Aug 2024 07:00  --------------------------------------------------------  IN: 358.8 mL / OUT: 820 mL / NET: -461.2 mL    16 Aug 2024 07:01  -  16 Aug 2024 12:43  --------------------------------------------------------  IN: 0 mL / OUT: 190 mL / NET: -190 mL          PHYSICAL EXAM:  Gen: ill appearing on NIV      LABS:  ABG - ( 16 Aug 2024 04:12 )  pH, Arterial: 7.41  pH, Blood: x     /  pCO2: 27    /  pO2: 189   / HCO3: 17    / Base Excess: -6.2  /  SaO2: 99.0          -    153<H>  |  119<H>  |  73<HH>  ----------------------------<  180<H>  4.4   |  18  |  1.3    Ca    8.0<L>      16 Aug 2024 06:30  Phos  4.0     08-15  Mg     2.5     08-15    TPro  4.7<L>  /  Alb  2.3<L>  /  TBili  0.5  /  DBili      /  AST  24  /  ALT  32  /  AlkPhos  100        Phosphorus: 4.0 mg/dL (08-15-24 @ 19:51)                            12.1   15.83 )-----------( 223      ( 16 Aug 2024 06:30 )             38.2     Mean Cell Volume: 94.1 fL (24 @ 06:30)          Urine Studies:  Protein, Urine: Negative mg/dL (24 @ 08:40)  White Blood Cell - Urine: 2 /HPF (24 @ 08:40)  Red Blood Cell - Urine: 2 /HPF (24 @ 08:40)        Culture Results:   Growth in aerobic bottle: Staphylococcus epidermidis  "Susceptibilities not performed"  Direct identification is available within approximately 3-5  hours either by Blood Panel Multiplexed PCR or Direct  MALDI-TOF. Details: [email or web address] ( @ 08:30)  Culture Results:   Growth in anaerobic bottle: Staphylococcus epidermidis  Susceptibility to follow. ( @ 08:30)    Creatinine trend:  Creatinine: 1.3 mg/dL (24 @ 06:30)  Creatinine: 1.6 mg/dL (08-15-24 @ 21:50)  Creatinine: 1.5 mg/dL (08-15-24 @ 19:51)  Creatinine: 1.6 mg/dL (08-15-24 @ 15:59)  Creatinine: 1.7 mg/dL (08-15-24 @ 10:40)  Creatinine: 2.2 mg/dL (24 @ 21:50)  Creatinine: 2.5 mg/dL (24 @ 15:27)  Creatinine: 3.4 mg/dL (24 @ 08:15)    Sodium: 153 mmol/L (24 @ 06:30)  Sodium: 152 mmol/L (08-15-24 @ 21:50)  Sodium: 154 mmol/L (08-15-24 @ 19:51)  Sodium: 153 mmol/L (08-15-24 @ 15:59)  Sodium: 155 mmol/L (08-15-24 @ 10:40)  Sodium: 159 mmol/L (24 @ 21:50)  Sodium: 163 mmol/L (24 @ 15:27)  Sodium: 164 mmol/L (24 @ 08:15)

## 2024-08-16 NOTE — PROGRESS NOTE ADULT - ASSESSMENT
75 yo F with severe dementia bedbound aox0 presents by ambulance for increased work of breathing, cough, and decreased responsiveness at home.    - c/w IV cefepime, IV vanco  - c/w IV pressors  - on NRB  - d/w son, see above  - family interested in NGT trial for now, but now PEG  - d/w son Sergio, who has been in contact with siblings regarding decisions  - d/w team  ______________  Joce Yang MD  Palliative Medicine  [facility]   of Geriatric and Palliative Medicine  [phone]

## 2024-08-16 NOTE — PROGRESS NOTE ADULT - SUBJECTIVE AND OBJECTIVE BOX
HPI: 75 yo F with severe dementia bedbound aox0 presents by ambulance for increased work of breathing, cough, and decreased responsiveness at home.     INTERVAL EVENTS  8/16: patient appears comfortbale    ADVANCE DIRECTIVES:    [ ] Full Code [ ] DNR  MOLST  [ ]  Living Will  [ ]   DECISION MAKER(s):  [ ] Health Care Proxy(s)  [ ] Surrogate(s)  [ ] Guardian           Name(s): Phone Number(s):    BASELINE (I)ADL(s) (prior to admission):  [place]: [ ]Total  [ ] Moderate [ ]Dependent  Palliative Performance Status Version 2:         %    http://[email or web address]/files/news/palliative_performance_scale_ppsv2.pdf    Allergies    ciprofloxacin (Unknown)  sulfa drugs (Unknown)  erythromycin (Unknown)  penicillin (Unknown)  penicillins (Unknown)    Intolerances    MEDICATIONS  (STANDING):    MEDICATIONS  (PRN):      PRESENT SYMPTOMS: [ ]Unable to obtain due to poor mentation   Source if other than patient:  [ ]Family   [ ]Team     Pain: [ ]yes [ ]no  QOL impact -   Location -                    Aggravating factors -  Quality -  Radiation -  Timing-  Severity (0-10 scale):  Minimal acceptable level (0-10 scale):     CPOT:    https://www.[facility].org/getattachment/ewc83w50-8a7w-0z3z-3n0a-7825u8755v3x/Critical-Care-Pain-Observation-Tool-(CPOT)    PAIN AD Score:   http://geriatrictoolkit.[email or web address]/painad.pdf (press ctrl +  left click to view)    Dyspnea:                           [ ]None[ ]Mild [ ]Moderate [ ]Severe     Respiratory Distress Observation Scale (RDOS):   A score of 0 to 2 signifies little or no respiratory distress, 3 signifies mild distress, scores 4 to 6 indicate moderate distress, and scores greater than 7 signify severe distress  [email or web address]/[email or web address]-mrpfpgmwcww-htncpqwc-jtugwelchjd-youyp.pdf    Anxiety:                             [ ]None[ ]Mild [ ]Moderate [ ]Severe   Fatigue:                             [ ]None[ ]Mild [ ]Moderate [ ]Severe   Nausea:                             [ ]None[ ]Mild [ ]Moderate [ ]Severe   Loss of appetite:              [ ]None[ ]Mild [ ]Moderate [ ]Severe   Constipation:                    [ ]None[ ]Mild [ ]Moderate [ ]Severe    Other Symptoms:  [ ]All other review of systems negative     Palliative Performance Status Version 2:         %    http://npcrc.org/files/news/palliative_performance_scale_ppsv2.pdf  PHYSICAL EXAM:  Vital Signs Last 24 Hrs  T(C): 36.1 (16 Aug 2024 11:00), Max: 37.2 (15 Aug 2024 13:55)  T(F): 96.9 (16 Aug 2024 11:00), Max: 99 (15 Aug 2024 13:55)  HR: 60 (16 Aug 2024 11:00) (54 - 94)  BP: 134/62 (16 Aug 2024 11:00) (70/37 - 141/63)  BP(mean): 89 (16 Aug 2024 11:00) (48 - 103)  RR: 24 (16 Aug 2024 11:00) (20 - 36)  SpO2: 100% (16 Aug 2024 11:00) (86% - 100%)    Parameters below as of 16 Aug 2024 11:00      O2 Concentration (%): 60    GENERAL:  [X ] No acute distress [ ]Lethargic  [ ]Unarousable  [ ]Verbal  [ ]Non-Verbal [ ]Cachexia    BEHAVIORAL/PSYCH:  [ ]Alert and Oriented x  [ ] Anxiety [ ] Delirium [ ] Agitation [X ] Calm   EYES: [ ] No scleral icterus [ ] Scleral icterus [ ] Closed  ENMT:  [ ]Dry mouth  [ ]No external oral lesions [ ] No external ear or nose lesions  CARDIOVASCULAR:  [ ]Regular [ ]Irregular [ ]Tachy [ ]Not Tachy  [ ]Justice [ ] Edema [ ] No edema  PULMONARY:  [ ]Tachypnea  [ ]Audible excessive secretions [X ] No labored breathing [ ] labored breathing  GASTROINTESTINAL: [ ]Soft  [ ]Distended  [ X]Not distended [ ]Non tender [ ]Tender  MUSCULOSKELETAL: [ ]No clubbing [ ] clubbing  [ ] No cyanosis [ ] cyanosis  NEUROLOGIC: [ ]No focal deficits  [ ]Follows commands  [ ]Does not follow commands  [ ]Cognitive impairment  [ ]Dysphagia  [ ]Dysarthria  [ ]Paresis   SKIN: [ ] Jaundiced [X ] Non-jaundiced [ ]Rash [ ]No Rash [ ] Warm [ ] Dry  MISC/LINES: [ ] ET tube [ ] Trach [ ]NGT/OGT [ ]PEG [ ]Fischer    CRITICAL CARE:  [ ] Shock Present  [ ]Septic [ ]Cardiogenic [ ]Neurologic [ ]Hypovolemic  [ ]  Vasopressors [ ]  Inotropes   [ ]Respiratory failure present [ ]Mechanical ventilation [ ]Non-invasive ventilatory support [ ]High flow  [ ]Acute  [ ]Chronic [ ]Hypoxic  [ ]Hypercarbic [ ]Other  [ ]Other organ failure     LABS: reviewed by me, leukocytosis                                   12.1   15.83 )-----------( 223      ( 16 Aug 2024 06:30 )             38.2     08-16    153<H>  |  119<H>  |  73<HH>  ----------------------------<  180<H>  4.4   |  18  |  1.3    Ca    8.0<L>      16 Aug 2024 06:30  Phos  4.0     08-15  Mg     2.5     08-15        RADIOLOGY & ADDITIONAL STUDIES: reviewed by me    PROTEIN CALORIE MALNUTRITION PRESENT: [ ]mild [ ]moderate [ ]severe [ ]underweight [ ]morbid obesity  https://www.andeal.org/vault/2440/web/files/ONC/Table_Clinical%20Characteristics%20to%20Document%20Malnutrition-White%20JV%20et%20al%943752.pdf    Height (cm): 147.3 (08-14-24 @ 07:49)  Weight (kg): 36.3 (08-14-24 @ 07:49)  BMI (kg/m2): 16.7 (08-14-24 @ 07:49)    [ ]PPSV2 < or = to 30% [ ]significant weight loss  [ ]poor nutritional intake  [ ]anasarca      [ ]Artificial Nutrition          Palliative Care Spiritual/Emotional Screening Tool Question  Severity (0-4):                    OR                    [X ] Unable to determine/NA  Score of 2 or greater indicates recommendation of Chaplaincy referral  Chaplaincy Referral: [ ] Yes [ ] Refused [ ] Following     Caregiver [place]:  [ ] Yes [ ] No    OR    [x ] Unable to determine. Will assess at later time if appropriate.  Social Work Referral [ ]  Patient and Family Centered Care Referral [ ]    Anticipatory Grief Present: [ ] Yes [ ] No    OR     [ x] Unable to determine. Will assess at later time if appropriate.  Social Work Referral [ ]  Patient and Family Centered Care Referral [ ]    REFERRALS:   [ ]Chaplaincy  [ ]Hospice  [ ]Child Life  [ ]Social Work  [ ]Case management [ ]Holistic Therapy     Palliative care education provided to patient and/or family    Goals of Care Document:     ______________

## 2024-08-16 NOTE — PROGRESS NOTE ADULT - ASSESSMENT
74F with severe dementa bedbound aox0 presents by ambulance for increased work of breathing, cough, and decreased responsiveness at home.    ID is consulted for sepsis  Hypothermia to 94.5, WBC 11.03  Hypoxemia requiring NRB  Lactate 6.8 > 4.1  BCx 8/14 CoNS, likely procurement contamination  UA WBC 2  COVID +    CXR with Bibasilar opacities  CT A/P wo contrast 8/14  Right basilar pleural effusion with adjacent atelectasis/consolidation.   Apparent occlusion of the right lower lobe bronchus causing atelectasis.   Mild left basilar atelectasis/consolidation. Infection is not excluded.   Consider interval follow-up chest CT with IV contrast for further   evaluation.    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  Attending Physician  Division of Infectious Diseases  [facility]  Please contact me via [employer] Teams

## 2024-08-16 NOTE — PROGRESS NOTE ADULT - SUBJECTIVE AND OBJECTIVE BOX
INFECTIOUS DISEASE FOLLOW UP NOTE:    Interval History/ROS: Patient is a 74y old  Female who presents with a chief complaint of septic shock (16 Aug 2024 12:43)    Overnight events: On BiPAP. minimally responsive.    REVIEW OF SYSTEMS:  Unable to provide due to respiratory failure    Prior hospital charts reviewed [Yes]  Primary team notes reviewed [Yes]  Other consultant notes reviewed [Yes]    Allergies:  ciprofloxacin (Unknown)  sulfa drugs (Unknown)  erythromycin (Unknown)  penicillin (Unknown)  penicillins (Unknown)      ANTIMICROBIALS:   cefepime   IVPB 1000 every 24 hours  remdesivir  IVPB 100 every 24 hours  remdesivir  IVPB        OTHER MEDS: MEDICATIONS  (STANDING):  acetaminophen  Suppository .. 650 once  acetylcysteine 20%  Inhalation 4 every 6 hours  albuterol/ipratropium for Nebulization 3 every 6 hours  dexAMETHasone  Injectable 6 daily  heparin   Injectable 5000 every 12 hours  midodrine 10 every 8 hours  norepinephrine Infusion 0.15 <Continuous>  pantoprazole  Injectable 40 daily      Vital Signs Last 24 Hrs  T(F): 95.9 (08-16-24 @ 19:00), Max: 100.9 (08-15-24 @ 05:05)    Vital Signs Last 24 Hrs  HR: 52 (08-16-24 @ 22:00) (49 - 83)  BP: 96/52 (08-16-24 @ 22:00) (89/- - 155/68)  RR: 22 (08-16-24 @ 22:00)  SpO2: 100% (08-16-24 @ 22:00) (86% - 100%)  Wt(kg): --    EXAM:  GENERAL: NAD, lying in bed. very frail and weak  HEAD: No head lesions  EYES: Conjunctiva pink and cornea white  EAR, NOSE, MOUTH, THROAT: Normal external ears and nose, no discharges; dry mucous membranes; + BiPAP  NECK: Supple, no JVD  RESPIRATORY: RLL rhonchi  CARDIOVASCULAR: S1 S2, tachycardia  GASTROINTESTINAL: Soft, nontender, nondistended; normoactive bowel sounds  GENITOURINARY: + trujillo catheter  EXTREMITIES: Severely contracted extremities  NERVOUS SYSTEM: Not responsive, not oriented  MUSCULOSKELETAL: No joint erythema, swelling  SKIN: Sacral ulcer stage 2, no purulence. no superficial thrombophlebitis  PSYCH: Lethargic    Labs:                        12.1   15.83 )-----------( 223      ( 16 Aug 2024 06:30 )             38.2     08-16    153<H>  |  119<H>  |  73<HH>  ----------------------------<  180<H>  4.4   |  18  |  1.3    Ca    8.0<L>      16 Aug 2024 06:30  Phos  4.0     08-15  Mg     2.5     08-15    TPro  4.7<L>  /  Alb  2.3<L>  /  TBili  0.5  /  DBili  x   /  AST  24  /  ALT  32  /  AlkPhos  100  08-16      WBC Trend:  WBC Count: 15.83 (08-16-24 @ 06:30)  WBC Count: 12.59 (08-15-24 @ 15:59)  WBC Count: 11.81 (08-15-24 @ 10:40)  WBC Count: 11.03 (08-14-24 @ 08:15)      Creatine Trend:  Creatinine: 1.3 (08-16)  Creatinine: 1.6 (08-15)  Creatinine: 1.5 (08-15)  Creatinine: 1.6 (08-15)      Liver Biochemical Testing Trend:  Alanine Aminotransferase (ALT/SGPT): 32 (08-16)  Alanine Aminotransferase (ALT/SGPT): 32 (08-15)  Alanine Aminotransferase (ALT/SGPT): 33 (08-15)  Alanine Aminotransferase (ALT/SGPT): 54 *H* (08-14)  Alanine Aminotransferase (ALT/SGPT): 70 *H* (08-14)  Aspartate Aminotransferase (AST/SGOT): 24 (08-16-24 @ 06:30)  Aspartate Aminotransferase (AST/SGOT): 25 (08-15-24 @ 21:50)  Aspartate Aminotransferase (AST/SGOT): 37 (08-15-24 @ 19:51)  Aspartate Aminotransferase (AST/SGOT): 51 (08-14-24 @ 15:27)  Aspartate Aminotransferase (AST/SGOT): 59 (08-14-24 @ 08:15)  Bilirubin Total: 0.5 (08-16)  Bilirubin Total: 0.5 (08-15)  Bilirubin Total: 0.6 (08-15)  Bilirubin Total: 1.0 (08-14)  Bilirubin Total: 1.0 (08-14)      Trend LDH      Urinalysis Basic - ( 16 Aug 2024 06:30 )    Color: x / Appearance: x / SG: x / pH: x  Gluc: 180 mg/dL / Ketone: x  / Bili: x / Urobili: x   Blood: x / Protein: x / Nitrite: x   Leuk Esterase: x / RBC: x / WBC x   Sq Epi: x / Non Sq Epi: x / Bacteria: x        MICROBIOLOGY:  Vancomycin Level, Random: 33.8 (08-16 @ 06:30)  Vancomycin Level, Random: 12.1 (08-15 @ 10:53)  Vancomycin Level, Trough: 15.9 (08-15 @ 07:00)    MRSA PCR Result.: Negative (08-14-24 @ 19:10)      Urinalysis with Rflx Culture (collected 14 Aug 2024 08:40)    Culture - Blood (collected 14 Aug 2024 08:30)  Source: .Blood Blood-Peripheral  Final Report:    Growth in aerobic bottle: Staphylococcus epidermidis    "Susceptibilities not performed"    Direct identification is available within approximately 3-5    hours either by Blood Panel Multiplexed PCR or Direct    MALDI-TOF. Details: [email or web address]  Organism: Blood Culture PCR  Organism: Blood Culture PCR    Sensitivities:      Method Type: PCR      -  Staphylococcus epidermidis, Methicillin resistant: Detec    Culture - Blood (collected 14 Aug 2024 08:30)  Source: .Blood Blood-Peripheral  Preliminary Report:    Growth in anaerobic bottle: Staphylococcus epidermidis    Susceptibility to follow.    Troponin T, High Sensitivity Result: 140 (08-14)  Troponin T, High Sensitivity Result: 142 (08-14)  Troponin T, High Sensitivity Result: 214 (08-14)    Blood Gas Arterial, Lactate: 1.8 (08-16 @ 04:12)  Blood Gas Arterial, Lactate: 2.7 (08-16 @ 00:59)  Lactate, Blood: 4.1 (08-15 @ 15:59)  Lactate, Blood: 4.1 (08-14 @ 21:50)      RADIOLOGY:  imaging below personally reviewed    < from: CT Chest No Cont (08.14.24 @ 16:25) >  IMPRESSION:    Intraluminal filling defect consistent with mucus plugging and/or debris   is noted within the right and left lower lobe bronchi.    Consolidation with air bronchograms consistent with lobar pneumonia in   the right and left lower lobes.    Indeterminate 8 mm circular calcification with lucent center that appears   to be within the right hemithorax adjacent to the right side of T7 and T8   vertebrae.    Fibrocalcific changes noted in both lung apices.    Wedge-shaped compression deformities of T3,T4 and T5 vertebrae with   exaggerated kyphosis of thoracic spine.    < end of copied text >    < from: CT Abdomen and Pelvis No Cont (08.14.24 @ 10:56) >  IMPRESSION:  Limited noncontrast study without IV and oral contrast.    Right basilar pleural effusion with adjacent atelectasis/consolidation.   Apparent occlusion of the right lower lobe bronchus causing atelectasis.   Mild left basilar atelectasis/consolidation. Infection is not excluded.   Consider interval follow-up chest CT with IV contrast for further   evaluation.    < end of copied text >

## 2024-08-16 NOTE — PROGRESS NOTE ADULT - ASSESSMENT
DEIRDRE d/t hypotension, sepsis improving with volume resuscitation  Hypernatremia d/t severe dehydration  Severe COVID19 PNA, septic shock, bacteremia  Acute respiratory failure / aspiration PNA  - non oliguric  - creat down-trending  - change iv fluids to d5w  - add sodium bicarb po 1300mg q8h (if pt can tolerate po meds)  - f/u cultures / cont abx per ID recs, on cefepime, remdesivir, dose vanc by level  - cont midodrine if BP low  - phos ok, no need for binder  - poor prognosis / GOC ongoing

## 2024-08-16 NOTE — CHART NOTE - NSCHARTNOTEFT_GEN_A_CORE
Case reviewed with intensivist. I expressed concern that need for intubation may be near though patient is using NRB with 100% saturation. Will be ordering ABG per earlier recommendation

## 2024-08-16 NOTE — PROGRESS NOTE ADULT - ASSESSMENT
Impression:     Sepsis  Bacteremia   AMS   DEIRDRE   Hypernatremia from dehydration   Advanced dementia   Dysphagia   Aspiration pneumonia   COVID positive       PLAN:    CNS: AMS, dementia is advanced at baseline.     HEENT: Oral care    PULMONARY:  HOB @ 45 degrees. CXR with likely aspiration pneumonia. NRB facemask for now. ABG needed if family wants everything done. Low threshold to intubate.     CARDIOVASCULAR: Needs maintenance fluids. Goal MAP more than 60mmhg. Levophed. Echo.     GI: GI prophylaxis.  Feeding: cannot tolerate PO. NGT.     RENAL:  Follow up lytes.  Correct as needed. DEIRDRE noted. No hydro on CT. Recommend bicarb drip at 50cc/hr. ABG.     INFECTIOUS DISEASE: Blood culture with gram positive cocci in clusters. Echo TTE. Vancomycin IV 1g once then per daily level. Continue with Cefepime. ID follow up.     HEMATOLOGICAL:  DVT prophylaxis. Duplex LEs.     ENDOCRINE:  Follow up FS.  Insulin protocol if needed.    MUSCULOSKELETAL: Bedbound at baseline.    Prognosis is dismal; comfort measures conversation should be revisited.     If family wants all measures done then transfer to ICU.            Impression:   84 y o f with dementia initially admitted for COVID, found with super imposed pneumonia, now upgraded  from the floors due to increased work of breathing     Sepsis  Bacteremia staph epi contaminant   AMS on dementia   DEIRDRE   Transaminitis   Hypernatremia from dehydration   Advanced dementia   Dysphagia   Aspiration pneumonia   COVID positive       PLAN:    CNS: AMS, dementia is advanced at baseline.     HEENT: Oral care    PULMONARY:  HOB @ 45 degrees. CXR with likely aspiration pneumonia.  bipap now,  Low threshold to intubate. wean bipap     CARDIOVASCULAR: Needs maintenance fluids. Goal MAP more than 60mmhg. Levophed. Echo noted     GI: GI prophylaxis.  Feeding: cannot tolerate PO. NGT. BMs noted     RENAL:  Follow up lytes.  Correct as needed. DEIRDRE noted. No hydro on CT. c/w LR     INFECTIOUS DISEASE: Blood culture with gram positive cocci in clusters. Echo TTE. Vancomycin IV 1g once for contaminant can dc. Continue with Cefepime. ID follow up.     HEMATOLOGICAL:  DVT prophylaxis. Duplex LEs. cannot be done due to contracted     ENDOCRINE:  Follow up FS.  Insulin protocol if needed.    MUSCULOSKELETAL: Bedbound at baseline.    Prognosis is dismal; comfort measures conversation should be revisited.     Palliative consult   hospice approprioate           Impression:   84 y o f with dementia initially admitted for COVID, found with super imposed pneumonia, now upgraded  from the floors due to increased work of breathing     Sepsis  Bacteremia staph epi contaminant   severe end stage dementia   DEIRDRE   Transaminitis   Hypernatremia from dehydration   Dysphagia   Aspiration pneumonia   COVID positive       PLAN:    CNS: AMS, dementia is advanced at baseline.     HEENT: Oral care    PULMONARY:  HOB @ 45 degrees. CXR with likely aspiration pneumonia.  bipap now,  Low threshold to intubate. wean bipap     CARDIOVASCULAR: Needs maintenance fluids. Goal MAP more than 60mmhg. Levophed. Echo noted     GI: GI prophylaxis.  Feeding: cannot tolerate PO. NGT. BMs noted     RENAL:  Follow up lytes.  Correct as needed. DEIRDRE noted. No hydro on CT. c/w LR     INFECTIOUS DISEASE: Blood culture with gram positive cocci in clusters. Echo TTE. Vancomycin IV 1g once for contaminant can dc. Continue with Cefepime. ID follow up.     HEMATOLOGICAL:  DVT prophylaxis. Duplex LEs. cannot be done due to contracted     ENDOCRINE:  Follow up FS.  Insulin protocol if needed.    MUSCULOSKELETAL: Bedbound at baseline.    Prognosis is dismal; comfort measures conversation should be revisited.     Palliative consult   hospice approprioate    The patient is critically ill with a high probability of life threatening deterioration.

## 2024-08-16 NOTE — PROGRESS NOTE ADULT - SUBJECTIVE AND OBJECTIVE BOX
CC.  Septic shock  HPI.  Patient is BIPAP.    Reports SOB.  Offers no other complaints                 Constitutional: No fever, fatigue or weight loss.  Skin: No rash.  Eyes: No recent vision problems or eye pain.  ENT: No congestion, ear pain, or sore throat.  Endocrine: No thyroid problems.  Cardiovascular: No chest pain or palpation.  Respiratory: No   congestion, or wheezing.  +Cough/SOB  Gastrointestinal: No abdominal pain, nausea, vomiting, or diarrhea.  Genitourinary: No dysuria.  Musculoskeletal: No joint swelling.  Neurologic: No headache.      Vital Signs Last 24 Hrs  T(C): 36.1 (08-16-24 @ 11:00), Max: 37.2 (08-15-24 @ 13:55)  T(F): 96.9 (08-16-24 @ 11:00), Max: 99 (08-15-24 @ 13:55)  HR: 60 (08-16-24 @ 11:00) (54 - 94)  BP: 134/62 (08-16-24 @ 11:00) (70/37 - 141/63)  BP(mean): 89 (08-16-24 @ 11:00) (48 - 103)  RR: 24 (08-16-24 @ 11:00) (20 - 36)  SpO2: 100% (08-16-24 @ 11:00) (86% - 100%)        PHYSICAL EXAM-  GENERAL: NAD, well-groomed, well-developed  HEAD:  Atraumatic, Normocephalic  EYES: EOMI, PERRLA, conjunctiva and sclera clear  NECK: Supple, No JVD, Normal thyroid  NERVOUS SYSTEM:  Alert & Oriented X3, Motor Strength 5/5 B/L upper and lower extremities; DTRs 2+ intact and symmetric  CHEST/LUNG: Clear to percussion bilaterally; No rales, rhonchi, wheezing, or rubs  HEART: Regular rate and rhythm; No murmurs, rubs, or gallops  ABDOMEN: Soft, Nontender, Nondistended; Bowel sounds present  EXTREMITIES:  2+ Peripheral Pulses, No clubbing, cyanosis, or edema  SKIN: No rashes or lesions                                  12.1   15.83 )-----------( 223      ( 16 Aug 2024 06:30 )             38.2     08-16    153<H>  |  119<H>  |  73<HH>  ----------------------------<  180<H>  4.4   |  18  |  1.3    Ca    8.0<L>      16 Aug 2024 06:30  Phos  4.0     08-15  Mg     2.5     08-15    TPro  4.7<L>  /  Alb  2.3<L>  /  TBili  0.5  /  DBili  x   /  AST  24  /  ALT  32  /  AlkPhos  100  08-16          Urinalysis Basic - ( 16 Aug 2024 06:30 )    Color: x / Appearance: x / SG: x / pH: x  Gluc: 180 mg/dL / Ketone: x  / Bili: x / Urobili: x   Blood: x / Protein: x / Nitrite: x   Leuk Esterase: x / RBC: x / WBC x   Sq Epi: x / Non Sq Epi: x / Bacteria: x              MEDICATIONS  (STANDING):  acetaminophen  Suppository .. 650 milliGRAM(s) Rectal once  acetylcysteine 20%  Inhalation 4 milliLiter(s) Inhalation every 6 hours  albuterol/ipratropium for Nebulization 3 milliLiter(s) Nebulizer every 6 hours  cefepime   IVPB 1000 milliGRAM(s) IV Intermittent every 24 hours  chlorhexidine 2% Cloths 1 Application(s) Topical <User Schedule>  clotrimazole 1% Cream 1 Application(s) Topical every 12 hours  dexAMETHasone  Injectable 6 milliGRAM(s) IV Push daily  heparin   Injectable 5000 Unit(s) SubCutaneous every 12 hours  lactated ringers. 1000 milliLiter(s) (50 mL/Hr) IV Continuous <Continuous>  midodrine 10 milliGRAM(s) Oral every 8 hours  norepinephrine Infusion 0.15 MICROgram(s)/kG/Min (10.2 mL/Hr) IV Continuous <Continuous>  pantoprazole  Injectable 40 milliGRAM(s) IV Push daily  remdesivir  IVPB 100 milliGRAM(s) IV Intermittent every 24 hours  remdesivir  IVPB   IV Intermittent     MEDICATIONS  (PRN):          RADIOLOGY RESULTS:

## 2024-08-16 NOTE — CHART NOTE - NSCHARTNOTEFT_GEN_A_CORE
Repeat labs reviewed, potassium lower, will supplement. Anion gap improving but kidney function slightly worse. In addition patient unable to take oral sodium bicarb as recommended by nephrology. Based on this fact and per recommendation of intensivist, will start bicarb infusion at 50 ml per hour Repeat labs reviewed, potassium lower, will supplement. Anion gap and carbon dioxide levels improving but kidney function slightly worse. In addition patient unable to take oral sodium bicarb as recommended by nephrology. Based on this fact and per recommendation of intensivist, will start bicarb infusion at 50 ml per hour

## 2024-08-17 NOTE — PROGRESS NOTE ADULT - ASSESSMENT
DEIRDRE d/t hypotension, sepsis improving with volume resuscitation  Hypernatremia d/t severe dehydration  Severe COVID19 PNA, septic shock, bacteremia  Acute respiratory failure / aspiration PNA  - non oliguric  - creat down-trending  - change iv fluids to d5w  - add sodium bicarb po 1300mg q8h (if pt can tolerate po meds)  - f/u cultures / cont abx per ID recs, on cefepime, remdesivir, dose vanc by level  - cont midodrine if BP low  - phos ok, no need for binder  - poor prognosis / GOC ongoing DERIDRE d/t hypotension, sepsis improving with volume resuscitation  Hypernatremia d/t severe dehydration - improving  Severe COVID19 PNA, septic shock, bacteremia  Acute respiratory failure / aspiration PNA  - non oliguric  - creat down-trending  - cont iv fluids to d5w 75 cc/hr  - add sodium bicarb po 650 mg q8h (if pt can tolerate po meds)  - f/u cultures / cont abx per ID recs, on cefepime, remdesivir, dose vanc by level  - cont midodrine if BP low  -  pt will need an NGT for feeds once off BiPAP  - poor prognosis / GOC ongoing

## 2024-08-17 NOTE — SWALLOW BEDSIDE ASSESSMENT ADULT - SWALLOW EVAL: DIAGNOSIS
Unable to assess oropharyngeal swallow function 2/2 pt on nonrebreather mask. Unable to assess oropharyngeal swallow function 2/2 pt on BIPAP

## 2024-08-17 NOTE — PROGRESS NOTE ADULT - SUBJECTIVE AND OBJECTIVE BOX
Pt calm    T(F): , Max: 96.7 (08-17-24 @ 19:00)  HR: 89 (08-17-24 @ 18:00) (43 - 100)  BP: 128/59 (08-17-24 @ 19:00)  RR: 30 (08-17-24 @ 19:00)  SpO2: 98% (08-17-24 @ 19:00)  IN: 2229.6 mL / OUT: 665 mL / NET: 1564.6 mL    IN: 1422 mL / OUT: 515 mL / NET: 907 mL      General: No apparent distress  Cardiovascular: S1, S2  Gastrointestinal: Soft, Non-tender, Non-distended  Respiratory: Good air entry bilaterally  Lymphatic: No edema  Dermatologic: Skin dry                          11.4   14.18 )-----------( 235      ( 17 Aug 2024 06:56 )             35.2     08-17    147<H>  |  114<H>  |  53<H>  ----------------------------<  223<H>  4.1   |  19  |  1.0    Ca    7.7<L>      17 Aug 2024 06:56  Mg     2.3     08-17    TPro  4.7<L>  /  Alb  2.4<L>  /  TBili  0.4  /  DBili  x   /  AST  20  /  ALT  30  /  AlkPhos  95  08-17

## 2024-08-17 NOTE — PROGRESS NOTE ADULT - ASSESSMENT
Impression:   Sepsis  Bacteremia staph epi contaminant   severe end stage dementia   DEIRDRE   Transaminitis   Hypernatremia from dehydration   Dysphagia   Aspiration pneumonia   COVID positive       PLAN:    CNS: AMS, dementia is advanced at baseline.     HEENT: Oral care    PULMONARY:  HOB @ 45 degrees. CXR with likely aspiration pneumonia.  NIV as needed   Low threshold to intubate.    CARDIOVASCULAR: Needs maintenance fluids. Goal MAP more than 60mmhg. wean pressors. Echo noted     GI: GI prophylaxis.  Feeding: cannot tolerate PO.   NGT. BMs noted     RENAL:  Follow up lytes.  Correct as needed. DEIRDRE noted. No hydro on CT. c/w LR     INFECTIOUS DISEASE: Blood culture with gram positive cocci in clusters.   Echo TTE.  abx per  ID     HEMATOLOGICAL:  DVT prophylaxis. Duplex LEs. cannot be done due to contracted     ENDOCRINE:  Follow up FS.  Insulin protocol if needed.    MUSCULOSKELETAL: Bedbound at baseline.    Prognosis is dismal; comfort measures conversation should be revisited.     Palliative consult   hospice appropriate    The patient is critically ill with a high probability of deterioration.

## 2024-08-17 NOTE — PROGRESS NOTE ADULT - ASSESSMENT
sepsis 2/2 RLL pneumonia  - cefepime      Severe COVID  - RDV  - sterids    DEIRDRE  - resolved    Sacral ulcer  POA  - wound care    Bedbound 2/2 dementia  - very poor prognosis

## 2024-08-17 NOTE — PROGRESS NOTE ADULT - SUBJECTIVE AND OBJECTIVE BOX
Patient is a 74y old  Female who presents with a chief complaint of septic shock (16 Aug 2024 22:18)        HPI:  74F with severe dementa bedbound aox0 presents by ambulance for increased work of breathing, cough, and decreased responsiveness at home. Patient lives with son and daughter in law who noticed her symptoms starting in the last 1-2 days. Daughter in law notes that patient appeared to have blue feet this morning, prompting her to call 911. Patient typically eat puree at home (spoon fed) and often coughs or chokes if fed too quickly or improperly positioned.   HCP is Mora Fernando who is currently in [place], phone number provided by daughter in law: [phone].  (14 Aug 2024 14:34)      Pt evaluated on rounds.  I reviewed the radiology tests and hospital record.    I reviewed previous notes on this patient.    Interval Events: No overnight events.        REVIEW OF SYSTEMS:   see HPI      OBJECTIVE:  ICU Vital Signs Last 24 Hrs  T(C): 35.4 (16 Aug 2024 23:00), Max: 36.1 (16 Aug 2024 11:00)  T(F): 95.7 (16 Aug 2024 23:00), Max: 96.9 (16 Aug 2024 11:00)  HR: 61 (17 Aug 2024 05:00) (43 - 72)  BP: 127/62 (17 Aug 2024 05:00) (96/52 - 155/68)  BP(mean): 88 (17 Aug 2024 05:00) (71 - 98)    RR: 26 (17 Aug 2024 05:00) (18 - 34)  SpO2: 100% (17 Aug 2024 05:00) (99% - 100%)    O2 Parameters below as of 17 Aug 2024 05:00  Patient On (Oxygen Delivery Method): BiPAP/CPAP              08-15 @ 07:01  -  08-16 @ 07:00  --------------------------------------------------------  IN: 358.8 mL / OUT: 820 mL / NET: -461.2 mL    08-16 @ 07:01  -  08-17 @ 05:45  --------------------------------------------------------  IN: 2120.2 mL / OUT: 630 mL / NET: 1490.2 mL      CAPILLARY BLOOD GLUCOSE      POCT Blood Glucose.: 108 mg/dL (15 Aug 2024 10:45)        PHYSICAL EXAM:       · ENMT:   Airway patent,   Nasal mucosa clear.  Mouth with normal mucosa.   No thrush    · EYES:   Clear bilaterally,   pupils equal,   round and reactive to light.    · CARDIAC:   Normal rate,   regular rhythm.    Heart sounds S1, S2.   No murmurs, no rubs or gallops on auscultation  no edema        CAROTID:   normal systolic impulse  no bruits    · RESPIRATORY:   rales  normal chest expansion  no retractions or use of accessory muscles  percussion of chest demonstrates no hyperresonance or dullness    · GASTROINTESTINAL:  Abdomen soft,   non-tender,   + BS  liver/spleen not palpable    · MUSCULOSKELETAL:   no clubbing, cyanosis      · SKIN:   Skin normal color for race,   warm, dry   No evidence of rash.        · HEME LYMPH:   no splenomegaly.  No cervical  lymphadenopathy.  no inguinal lymphadenopathy    HOSPITAL MEDICATIONS:  MEDICATIONS  (STANDING):  acetaminophen  Suppository .. 650 milliGRAM(s) Rectal once  acetylcysteine 20%  Inhalation 4 milliLiter(s) Inhalation every 6 hours  albuterol/ipratropium for Nebulization 3 milliLiter(s) Nebulizer every 6 hours  cefepime   IVPB 1000 milliGRAM(s) IV Intermittent every 24 hours  chlorhexidine 2% Cloths 1 Application(s) Topical <User Schedule>  clotrimazole 1% Cream 1 Application(s) Topical every 12 hours  dexAMETHasone  Injectable 6 milliGRAM(s) IV Push daily  dextrose 5%. 1000 milliLiter(s) (100 mL/Hr) IV Continuous <Continuous>  heparin   Injectable 5000 Unit(s) SubCutaneous every 12 hours  midodrine 10 milliGRAM(s) Oral every 8 hours  norepinephrine Infusion 0.15 MICROgram(s)/kG/Min (10.2 mL/Hr) IV Continuous <Continuous>  pantoprazole  Injectable 40 milliGRAM(s) IV Push daily  remdesivir  IVPB 100 milliGRAM(s) IV Intermittent every 24 hours  remdesivir  IVPB   IV Intermittent     MEDICATIONS  (PRN):    lactated ringers.: Solution, 1000 milliLiter(s) infuse at 50 mL/Hr  sodium chloride 0.9% Bolus:   500 milliLiter(s), IV Bolus, once, infuse over 1 Hr, Stop After 1 Doses  Provider's Contact #: [phone]  sodium chloride 0.9% Bolus:   500 milliLiter(s), IV Bolus, once, infuse over 1 Hr, Stop After 1 Doses  Provider's Contact #: [phone]  dextrose 5% + sodium chloride 0.45%.: Solution, 1000 milliLiter(s) infuse at 80 mL/Hr  Provider's Contact #: [phone]  dextrose 5% + sodium chloride 0.45%.: Solution, 1000 milliLiter(s) infuse at 75 mL/Hr  dextrose 5% + sodium chloride 0.45%.: Solution, 1000 milliLiter(s) infuse at 75 mL/Hr  lactated ringers Bolus:   1000 milliLiter(s), IV Bolus, once, infuse over 60 Minute(s), Stop After 1 Doses  Provider's Contact #: [phone]  lactated ringers Bolus:   1000 milliLiter(s), IV Bolus, once, infuse over 60 Minute(s), Stop After 1 Doses  Special Instructions: Re-assess every 15 minutes, consult with provider for volumes GREATER THAN 2000 milliLiter(s) per 60 minute(s). Record 2 BP values within ONE hour of completion of bolus administration.      LABS:                        12.1   15.83 )-----------( 223      ( 16 Aug 2024 06:30 )             38.2     08-16    153<H>  |  119<H>  |  73<HH>  ----------------------------<  180<H>  4.4   |  18  |  1.3    Ca    8.0<L>      16 Aug 2024 06:30  Phos  4.0     08-15  Mg     2.5     08-15    TPro  4.7<L>  /  Alb  2.3<L>  /  TBili  0.5  /  DBili  x   /  AST  24  /  ALT  32  /  AlkPhos  100  08-16      Urinalysis Basic - ( 16 Aug 2024 06:30 )    Color: x / Appearance: x / SG: x / pH: x  Gluc: 180 mg/dL / Ketone: x  / Bili: x / Urobili: x   Blood: x / Protein: x / Nitrite: x   Leuk Esterase: x / RBC: x / WBC x   Sq Epi: x / Non Sq Epi: x / Bacteria: x      Arterial Blood Gas:  08-16 @ 04:12  7.41/27/189/17/99.0/-6.2  ABG lactate: --  Arterial Blood Gas:  08-16 @ 00:59  7.41/28/71/18/97.0/-5.6  ABG lactate: --                  ABG - ( 16 Aug 2024 04:12 )  pH, Arterial: 7.41  pH, Blood: x     /  pCO2: 27    /  pO2: 189   / HCO3: 17    / Base Excess: -6.2  /  SaO2: 99.0                RADIOLOGY: Today I personally interpreted the latest CXR and other pertinent films.

## 2024-08-17 NOTE — PROGRESS NOTE ADULT - SUBJECTIVE AND OBJECTIVE BOX
Nephrology progress note    Patient is seen and examined, events over the last 24 h noted .    Allergies:  ciprofloxacin (Unknown)  sulfa drugs (Unknown)  erythromycin (Unknown)  penicillin (Unknown)  penicillins (Unknown)    Hospital Medications:   MEDICATIONS  (STANDING):  acetaminophen  Suppository .. 650 milliGRAM(s) Rectal once  acetylcysteine 20%  Inhalation 4 milliLiter(s) Inhalation every 6 hours  albuterol/ipratropium for Nebulization 3 milliLiter(s) Nebulizer every 6 hours  cefepime   IVPB 1000 milliGRAM(s) IV Intermittent every 24 hours  chlorhexidine 2% Cloths 1 Application(s) Topical <User Schedule>  clotrimazole 1% Cream 1 Application(s) Topical every 12 hours  dexAMETHasone  Injectable 6 milliGRAM(s) IV Push daily  dextrose 5%. 1000 milliLiter(s) (100 mL/Hr) IV Continuous <Continuous>  heparin   Injectable 5000 Unit(s) SubCutaneous every 12 hours  midodrine 10 milliGRAM(s) Oral every 8 hours  norepinephrine Infusion 0.15 MICROgram(s)/kG/Min (10.2 mL/Hr) IV Continuous <Continuous>  pantoprazole  Injectable 40 milliGRAM(s) IV Push daily  remdesivir  IVPB 100 milliGRAM(s) IV Intermittent every 24 hours  remdesivir  IVPB   IV Intermittent         VITALS:  T(F): 94 (08-17-24 @ 07:02), Max: 96.9 (08-16-24 @ 11:00)  HR: 63 (08-17-24 @ 08:01)  BP: 116/62 (08-17-24 @ 08:01)  RR: 28 (08-17-24 @ 08:01)  SpO2: 100% (08-17-24 @ 08:01)  Wt(kg): --    08-15 @ 07:01  -  08-16 @ 07:00  --------------------------------------------------------  IN: 358.8 mL / OUT: 820 mL / NET: -461.2 mL    08-16 @ 07:01  -  08-17 @ 07:00  --------------------------------------------------------  IN: 2229.6 mL / OUT: 665 mL / NET: 1564.6 mL    08-17 @ 07:01  -  08-17 @ 08:56  --------------------------------------------------------  IN: 206 mL / OUT: 0 mL / NET: 206 mL          PHYSICAL EXAM:  Constitutional: NAD  HEENT: anicteric sclera, oropharynx clear, MMM  Neck: No JVD  Respiratory: CTAB, no wheezes, rales or rhonchi  Cardiovascular: S1, S2, RRR  Gastrointestinal: BS+, soft, NT/ND  Extremities: No cyanosis or clubbing. No peripheral edema  Neurological: A/O   : No CVA tenderness. No trujillo.   Skin: No rashes  Vascular Access:    LABS:  08-17    147<H>  |  114<H>  |  53<H>  ----------------------------<  223<H>  4.1   |  19  |  1.0    Ca    7.7<L>      17 Aug 2024 06:56  Phos  4.0     08-15  Mg     2.3     08-17    TPro  4.7<L>  /  Alb  2.4<L>  /  TBili  0.4  /  DBili      /  AST  20  /  ALT  30  /  AlkPhos  95  08-17                          11.4   14.18 )-----------( 235      ( 17 Aug 2024 06:56 )             35.2       Urine Studies:  Urinalysis Basic - ( 17 Aug 2024 06:56 )    Color:  / Appearance:  / SG:  / pH:   Gluc: 223 mg/dL / Ketone:   / Bili:  / Urobili:    Blood:  / Protein:  / Nitrite:    Leuk Esterase:  / RBC:  / WBC    Sq Epi:  / Non Sq Epi:  / Bacteria:         RADIOLOGY & ADDITIONAL STUDIES:   Nephrology progress note    Patient is seen and examined, events over the last 24 h noted .  On BiPAP, lethargic at baseline, on D5w 100/hr  Allergies:  ciprofloxacin (Unknown)  sulfa drugs (Unknown)  erythromycin (Unknown)  penicillin (Unknown)  penicillins (Unknown)    Hospital Medications:   MEDICATIONS  (STANDING):  acetaminophen  Suppository .. 650 milliGRAM(s) Rectal once  acetylcysteine 20%  Inhalation 4 milliLiter(s) Inhalation every 6 hours  albuterol/ipratropium for Nebulization 3 milliLiter(s) Nebulizer every 6 hours  cefepime   IVPB 1000 milliGRAM(s) IV Intermittent every 24 hours  chlorhexidine 2% Cloths 1 Application(s) Topical <User Schedule>  clotrimazole 1% Cream 1 Application(s) Topical every 12 hours  dexAMETHasone  Injectable 6 milliGRAM(s) IV Push daily  dextrose 5%. 1000 milliLiter(s) (100 mL/Hr) IV Continuous <Continuous>  heparin   Injectable 5000 Unit(s) SubCutaneous every 12 hours  midodrine 10 milliGRAM(s) Oral every 8 hours  norepinephrine Infusion 0.15 MICROgram(s)/kG/Min (10.2 mL/Hr) IV Continuous <Continuous>  pantoprazole  Injectable 40 milliGRAM(s) IV Push daily  remdesivir  IVPB 100 milliGRAM(s) IV Intermittent every 24 hours  remdesivir  IVPB   IV Intermittent         VITALS:  T(F): 94 (08-17-24 @ 07:02), Max: 96.9 (08-16-24 @ 11:00)  HR: 63 (08-17-24 @ 08:01)  BP: 116/62 (08-17-24 @ 08:01)  RR: 28 (08-17-24 @ 08:01)  SpO2: 100% (08-17-24 @ 08:01)  Wt(kg): --    08-15 @ 07:01  -  08-16 @ 07:00  --------------------------------------------------------  IN: 358.8 mL / OUT: 820 mL / NET: -461.2 mL    08-16 @ 07:01  -  08-17 @ 07:00  --------------------------------------------------------  IN: 2229.6 mL / OUT: 665 mL / NET: 1564.6 mL    08-17 @ 07:01  -  08-17 @ 08:56  --------------------------------------------------------  IN: 206 mL / OUT: 0 mL / NET: 206 mL          PHYSICAL EXAM:  Constitutional: NAD  On BiPAP  Neck: No JVD  Respiratory: CTA  Cardiovascular: S1, S2, RRR  Gastrointestinal: BS+, soft, NT/ND  Extremities:  No peripheral edema  Neurological: Letahrgic  : HAs ronnie.   Skin: No rashes  Vascular Access:    LABS:  08-17    147<H>  |  114<H>  |  53<H>  ----------------------------<  223<H>  4.1   |  19  |  1.0    Ca    7.7<L>      17 Aug 2024 06:56  Phos  4.0     08-15  Mg     2.3     08-17    TPro  4.7<L>  /  Alb  2.4<L>  /  TBili  0.4  /  DBili      /  AST  20  /  ALT  30  /  AlkPhos  95  08-17                          11.4   14.18 )-----------( 235      ( 17 Aug 2024 06:56 )             35.2       Urine Studies:  Urinalysis Basic - ( 17 Aug 2024 06:56 )    Color:  / Appearance:  / SG:  / pH:   Gluc: 223 mg/dL / Ketone:   / Bili:  / Urobili:    Blood:  / Protein:  / Nitrite:    Leuk Esterase:  / RBC:  / WBC    Sq Epi:  / Non Sq Epi:  / Bacteria:         RADIOLOGY & ADDITIONAL STUDIES:

## 2024-08-17 NOTE — SWALLOW BEDSIDE ASSESSMENT ADULT - COMMENTS
Pt is currently on non-rebreather mask and lethargic. Not appropriate for PO intake. Pt is DNR/DNI. Rec: C discussion w/palliative team re: NGT vs. CMO. Pt is currently on bipap and lethargic. Not appropriate for PO intake. Pt is DNR/DNI. Rec: GOC discussion w/palliative team re: NGT vs. CMO.

## 2024-08-18 NOTE — DIETITIAN INITIAL EVALUATION ADULT - PERTINENT LABORATORY DATA
08-18    142  |  110  |  36<H>  ----------------------------<  195<H>  3.4<L>   |  18  |  0.8    Ca    7.5<L>      18 Aug 2024 06:23  Phos  2.6     08-18  Mg     2.0     08-18    TPro  4.2<L>  /  Alb  2.1<L>  /  TBili  0.4  /  DBili  x   /  AST  19  /  ALT  27  /  AlkPhos  92  08-18                          11.0   15.27 )-----------( 229      ( 18 Aug 2024 06:23 )             34.6

## 2024-08-18 NOTE — PROGRESS NOTE ADULT - SUBJECTIVE AND OBJECTIVE BOX
Patient is a 74y old  Female who presents with a chief complaint of septic shock (17 Aug 2024 20:18)        HPI:  74F with severe dementa bedbound aox0 presents by ambulance for increased work of breathing, cough, and decreased responsiveness at home. Patient lives with son and daughter in law who noticed her symptoms starting in the last 1-2 days. Daughter in law notes that patient appeared to have blue feet this morning, prompting her to call 911. Patient typically eat puree at home (spoon fed) and often coughs or chokes if fed too quickly or improperly positioned.   HCP is Mora Fernando who is currently in [place], phone number provided by daughter in law: [phone].  (14 Aug 2024 14:34)      Pt evaluated on rounds.  I reviewed the radiology tests and hospital record.    I reviewed previous notes on this patient.    Interval Events: No overnight events.      CAM:    SAT:    SBT:      REVIEW OF SYSTEMS:   see HPI      OBJECTIVE:  ICU Vital Signs Last 24 Hrs  T(C): 35.8 (18 Aug 2024 03:00), Max: 35.9 (17 Aug 2024 19:00)  T(F): 96.4 (18 Aug 2024 03:00), Max: 96.7 (17 Aug 2024 19:00)  HR: 64 (18 Aug 2024 00:00) (56 - 100)  BP: 105/55 (18 Aug 2024 00:00) (73/43 - 158/72)  BP(mean): 75 (18 Aug 2024 00:00) (53 - 104)  ABP: --  ABP(mean): --  RR: 22 (18 Aug 2024 03:00) (14 - 69)  SpO2: 100% (18 Aug 2024 03:00) (88% - 100%)    O2 Parameters below as of 18 Aug 2024 00:00  Patient On (Oxygen Delivery Method): BiPAP/CPAP              08-16 @ 07:01  -  08-17 @ 07:00  --------------------------------------------------------  IN: 2229.6 mL / OUT: 665 mL / NET: 1564.6 mL    08-17 @ 07:01  -  08-18 @ 05:39  --------------------------------------------------------  IN: 2246 mL / OUT: 1140 mL / NET: 1106 mL      CAPILLARY BLOOD GLUCOSE            PHYSICAL EXAM:       · ENMT:   Airway patent,   Nasal mucosa clear.  Mouth with normal mucosa.   No thrush    · EYES:   Clear bilaterally,   pupils equal,   round and reactive to light.    · CARDIAC:   Normal rate,   regular rhythm.    Heart sounds S1, S2.   No murmurs, no rubs or gallops on auscultation  no edema        CAROTID:   normal systolic impulse  no bruits    · RESPIRATORY:   rales  normal chest expansion  no retractions or use of accessory muscles  percussion of chest demonstrates no hyperresonance or dullness    · GASTROINTESTINAL:  Abdomen soft,   non-tender,   + BS  liver/spleen not palpable    · MUSCULOSKELETAL:   no clubbing, cyanosis      · SKIN:   Skin normal color for race,   warm, dry   No evidence of rash.        · HEME LYMPH:   no splenomegaly.  No cervical  lymphadenopathy.  no inguinal lymphadenopathy    HOSPITAL MEDICATIONS:  MEDICATIONS  (STANDING):  acetaminophen  Suppository .. 650 milliGRAM(s) Rectal once  acetylcysteine 20%  Inhalation 4 milliLiter(s) Inhalation every 6 hours  albuterol/ipratropium for Nebulization 3 milliLiter(s) Nebulizer every 6 hours  cefepime   IVPB 1000 milliGRAM(s) IV Intermittent every 24 hours  chlorhexidine 2% Cloths 1 Application(s) Topical <User Schedule>  clotrimazole 1% Cream 1 Application(s) Topical every 12 hours  dexAMETHasone  Injectable 6 milliGRAM(s) IV Push daily  dextrose 5%. 1000 milliLiter(s) (100 mL/Hr) IV Continuous <Continuous>  heparin   Injectable 5000 Unit(s) SubCutaneous every 12 hours  midodrine 10 milliGRAM(s) Oral every 8 hours  norepinephrine Infusion 0.15 MICROgram(s)/kG/Min (10.2 mL/Hr) IV Continuous <Continuous>  pantoprazole  Injectable 40 milliGRAM(s) IV Push daily  remdesivir  IVPB   IV Intermittent   remdesivir  IVPB 100 milliGRAM(s) IV Intermittent every 24 hours    MEDICATIONS  (PRN):    lactated ringers.: Solution, 1000 milliLiter(s) infuse at 50 mL/Hr  sodium chloride 0.9% Bolus:   500 milliLiter(s), IV Bolus, once, infuse over 1 Hr, Stop After 1 Doses  Provider's Contact #: [phone]  sodium chloride 0.9% Bolus:   500 milliLiter(s), IV Bolus, once, infuse over 1 Hr, Stop After 1 Doses  Provider's Contact #: [phone]  dextrose 5% + sodium chloride 0.45%.: Solution, 1000 milliLiter(s) infuse at 80 mL/Hr  Provider's Contact #: [phone]  dextrose 5% + sodium chloride 0.45%.: Solution, 1000 milliLiter(s) infuse at 75 mL/Hr  dextrose 5% + sodium chloride 0.45%.: Solution, 1000 milliLiter(s) infuse at 75 mL/Hr  lactated ringers Bolus:   1000 milliLiter(s), IV Bolus, once, infuse over 60 Minute(s), Stop After 1 Doses  Provider's Contact #: [phone]  lactated ringers Bolus:   1000 milliLiter(s), IV Bolus, once, infuse over 60 Minute(s), Stop After 1 Doses  Special Instructions: Re-assess every 15 minutes, consult with provider for volumes GREATER THAN 2000 milliLiter(s) per 60 minute(s). Record 2 BP values within ONE hour of completion of bolus administration.      LABS:                        11.4   14.18 )-----------( 235      ( 17 Aug 2024 06:56 )             35.2     08-17    147<H>  |  114<H>  |  53<H>  ----------------------------<  223<H>  4.1   |  19  |  1.0    Ca    7.7<L>      17 Aug 2024 06:56  Mg     2.3     08-17    TPro  4.7<L>  /  Alb  2.4<L>  /  TBili  0.4  /  DBili  x   /  AST  20  /  ALT  30  /  AlkPhos  95  08-17      Urinalysis Basic - ( 17 Aug 2024 06:56 )    Color: x / Appearance: x / SG: x / pH: x  Gluc: 223 mg/dL / Ketone: x  / Bili: x / Urobili: x   Blood: x / Protein: x / Nitrite: x   Leuk Esterase: x / RBC: x / WBC x   Sq Epi: x / Non Sq Epi: x / Bacteria: x                          RADIOLOGY: Today I personally interpreted the latest CXR and other pertinent films.

## 2024-08-18 NOTE — DIETITIAN INITIAL EVALUATION ADULT - OTHER INFO
pt is 74 year old female with hx of severe dementia, bedbound A+O x O p/w cough and decreased responsiveness at home. pt admitted with septic shock 2/2 possible aspiration PNA, severe DEIRDRE, elevated LFTs, hyponatremia. 8/15 s/p RR 2/2 hypotension, upgraded to CCU COVID +/sepsis. As per [facility] family interested in NGT trial but no PEG.

## 2024-08-18 NOTE — SWALLOW BEDSIDE ASSESSMENT ADULT - COMMENTS
Pt is currently on bipap and lethargic. Not appropriate for PO intake. Pt is DNR/Intubate. Rec: GOC discussion w/palliative team re: NGT vs. CMO. Per Palliative GOC- family would want a trial of NGT here, but no PEG if needed.

## 2024-08-18 NOTE — DIETITIAN INITIAL EVALUATION ADULT - ENTERAL
once respiratory fxn improves insert NGT for EN of glucerna 1.2  240 ml q 12 hrs with H20 flushes of 50 ml pre and post feed, increase as tolerated on day 2 to 240 ml q 8 hrs  will provide pt with 864 kcals, 43g protein and  720+300 ml total volume meeting 80% of estimated needs

## 2024-08-18 NOTE — PROGRESS NOTE ADULT - ASSESSMENT
sepsis 2/2 RLL pneumonia  - cefepime      Severe COVID  - RDV  - steroids    Sacral ulcer  POA  - wound care    Bedbound 2/2 dementia  - very poor prognosis

## 2024-08-18 NOTE — DIETITIAN INITIAL EVALUATION ADULT - ORAL INTAKE PTA/DIET HISTORY
as per EMR pt consumed puree with coughing. no family at bedside. no EMR hx    presently NPO, continuous bipap warranted

## 2024-08-18 NOTE — DIETITIAN INITIAL EVALUATION ADULT - PERTINENT MEDS FT
MEDICATIONS  (STANDING):  acetaminophen  Suppository .. 650 milliGRAM(s) Rectal once  acetylcysteine 20%  Inhalation 4 milliLiter(s) Inhalation every 6 hours  albuterol/ipratropium for Nebulization 3 milliLiter(s) Nebulizer every 6 hours  cefepime   IVPB 1000 milliGRAM(s) IV Intermittent every 24 hours  dexAMETHasone  Injectable 6 milliGRAM(s) IV Push daily  dextrose 5%. 1000 milliLiter(s) (100 mL/Hr) IV Continuous <Continuous>  midodrine 10 milliGRAM(s) Oral every 8 hours  norepinephrine Infusion 0.15 MICROgram(s)/kG/Min (10.2 mL/Hr) IV Continuous <Continuous>  pantoprazole  Injectable 40 milliGRAM(s) IV Push daily  remdesivir  IVPB 100 milliGRAM(s) IV Intermittent every 24 hours  remdesivir  IVPB   IV Intermittent     MEDICATIONS  (PRN):

## 2024-08-18 NOTE — PROGRESS NOTE ADULT - ASSESSMENT
Impression:   Sepsis  Bacteremia staph epi contaminant   severe end stage dementia   DEIRDRE   Transaminitis   Hypernatremia from dehydration   Dysphagia   Aspiration pneumonia   COVID positive       PLAN:    CNS: AMS, dementia is advanced at baseline.     HEENT: Oral care    PULMONARY:  HOB @ 45 degrees. CXR with likely aspiration pneumonia.  NIV as needed   Low threshold to intubate.    CARDIOVASCULAR: Needs maintenance fluids. Goal MAP more than 60mmhg.   wean pressors.   Echo noted     GI: GI prophylaxis.  Feeding: cannot tolerate PO.   NGT.   BMs routine    RENAL:  Follow up lytes.  Correct as needed. DEIRDRE noted. No hydro on CT. c/w LR     INFECTIOUS DISEASE: Blood culture with gram positive cocci in clusters.   Echo TTE.  abx per  ID     HEMATOLOGICAL:  DVT prophylaxis. Duplex LEs. cannot be done due to contracted     ENDOCRINE:  Follow up FS.  Insulin protocol if needed.    MUSCULOSKELETAL: Bedbound at baseline.    Prognosis is dismal; comfort measures conversation should be revisited.     [facility] Palliative consult     hospice appropriate    The patient is critically ill with a high probability of deterioration.

## 2024-08-18 NOTE — PROGRESS NOTE ADULT - ASSESSMENT
DEIRDRE d/t hypotension, sepsis improving with volume resuscitation  Hypernatremia d/t severe dehydration - improving  Severe COVID19 PNA, septic shock, bacteremia  Acute respiratory failure / aspiration PNA    - creat down to 0.8  - cont iv fluids to d5w 75 cc/hr  - add sodium bicarb po 650 mg q8h (if pt can tolerate po meds)  - f/u cultures / cont abx per ID recs, on cefepime, remdesivir, dose vanc by level  - cont midodrine if BP low  - poor prognosis / GOC ongoing  will sign off, recall as needed

## 2024-08-18 NOTE — DIETITIAN INITIAL EVALUATION ADULT - ADD RECOMMEND
RD to monitor respiratory fxn, ability to tolerate EN vs po diet, labs/meds, NFPF, f/u within 3-5 days  high risk

## 2024-08-18 NOTE — PROGRESS NOTE ADULT - SUBJECTIVE AND OBJECTIVE BOX
pt seen, on BIPAP still    T(F): , Max: 97.8 (08-18-24 @ 19:30)  HR: 85 (08-18-24 @ 21:00) (56 - 88)  BP: 106/48 (08-18-24 @ 21:00)  RR: 26 (08-18-24 @ 21:00)  SpO2: 100% (08-18-24 @ 21:00)  IN: 2555 mL / OUT: 1200 mL / NET: 1355 mL    IN: 1442 mL / OUT: 1430 mL / NET: 12 mL      General: No apparent distress  Cardiovascular: S1, S2  Gastrointestinal: Soft, Non-tender, Non-distended  Respiratory: Good air entry bilaterally  Musculoskeletal: Moves all extremities  Lymphatic: No edema  Neurologic: No gross motor deficit  Dermatologic: Skin dry                          11.0   15.27 )-----------( 229      ( 18 Aug 2024 06:23 )             34.6     08-18    142  |  110  |  36<H>  ----------------------------<  195<H>  3.4<L>   |  18  |  0.8    Ca    7.5<L>      18 Aug 2024 06:23  Phos  2.6     08-18  Mg     2.0     08-18    TPro  4.2<L>  /  Alb  2.1<L>  /  TBili  0.4  /  DBili  x   /  AST  19  /  ALT  27  /  AlkPhos  92  08-18

## 2024-08-18 NOTE — PROGRESS NOTE ADULT - SUBJECTIVE AND OBJECTIVE BOX
Nephrology progress note    Patient is seen and examined, events over the last 24 h noted .    Allergies:  ciprofloxacin (Unknown)  sulfa drugs (Unknown)  erythromycin (Unknown)  penicillin (Unknown)  penicillins (Unknown)    Hospital Medications:   MEDICATIONS  (STANDING):  acetaminophen  Suppository .. 650 milliGRAM(s) Rectal once  acetylcysteine 20%  Inhalation 4 milliLiter(s) Inhalation every 6 hours  albuterol/ipratropium for Nebulization 3 milliLiter(s) Nebulizer every 6 hours  cefepime   IVPB 1000 milliGRAM(s) IV Intermittent every 24 hours  chlorhexidine 2% Cloths 1 Application(s) Topical <User Schedule>  clotrimazole 1% Cream 1 Application(s) Topical every 12 hours  dexAMETHasone  Injectable 6 milliGRAM(s) IV Push daily  dextrose 5%. 1000 milliLiter(s) (100 mL/Hr) IV Continuous <Continuous>  heparin   Injectable 5000 Unit(s) SubCutaneous every 12 hours  midodrine 10 milliGRAM(s) Oral every 8 hours  norepinephrine Infusion 0.15 MICROgram(s)/kG/Min (10.2 mL/Hr) IV Continuous <Continuous>  pantoprazole  Injectable 40 milliGRAM(s) IV Push daily  potassium chloride  20 mEq/100 mL IVPB 20 milliEquivalent(s) IV Intermittent every 2 hours  remdesivir  IVPB 100 milliGRAM(s) IV Intermittent every 24 hours  remdesivir  IVPB   IV Intermittent         VITALS:  T(F): 97.2 (08-18-24 @ 15:00), Max: 97.3 (08-18-24 @ 11:03)  HR: 70 (08-18-24 @ 16:00)  BP: 126/56 (08-18-24 @ 16:00)  RR: 26 (08-18-24 @ 16:23)  SpO2: 100% (08-18-24 @ 16:23)  Wt(kg): --    08-16 @ 07:01  -  08-17 @ 07:00  --------------------------------------------------------  IN: 2229.6 mL / OUT: 665 mL / NET: 1564.6 mL    08-17 @ 07:01 - 08-18 @ 07:00  --------------------------------------------------------  IN: 2555 mL / OUT: 1200 mL / NET: 1355 mL    08-18 @ 07:01 - 08-18 @ 17:07  --------------------------------------------------------  IN: 0 mL / OUT: 1110 mL / NET: -1110 mL          PHYSICAL EXAM:  Constitutional: NAD  On BiPAP  Neck: No JVD  Respiratory: BS+  Cardiovascular: S1, S2, RRR  Gastrointestinal: BS+, soft, NT/ND  Extremities: No peripheral edema  Neurological: Lethargic  : has ronnie.   Vascular Access:    LABS:  08-18    142  |  110  |  36<H>  ----------------------------<  195<H>  3.4<L>   |  18  |  0.8    Ca    7.5<L>      18 Aug 2024 06:23  Phos  2.6     08-18  Mg     2.0     08-18    TPro  4.2<L>  /  Alb  2.1<L>  /  TBili  0.4  /  DBili      /  AST  19  /  ALT  27  /  AlkPhos  92  08-18                          11.0   15.27 )-----------( 229      ( 18 Aug 2024 06:23 )             34.6       Urine Studies:  Urinalysis Basic - ( 18 Aug 2024 06:23 )    Color:  / Appearance:  / SG:  / pH:   Gluc: 195 mg/dL / Ketone:   / Bili:  / Urobili:    Blood:  / Protein:  / Nitrite:    Leuk Esterase:  / RBC:  / WBC    Sq Epi:  / Non Sq Epi:  / Bacteria:         RADIOLOGY & ADDITIONAL STUDIES:

## 2024-08-19 NOTE — PROGRESS NOTE ADULT - SUBJECTIVE AND OBJECTIVE BOX
Patient seen and evaluated this am, remains on high flow oxygen  @50l/min    T(F): 95.7 (08-19-24 @ 11:00), Max: 98.2 (08-19-24 @ 06:00)  HR: 50 (08-19-24 @ 12:00)  BP: 115/56 (08-19-24 @ 12:00)  RR: 19 (08-19-24 @ 12:00)  SpO2: 99% (08-19-24 @ 12:00) (90% - 100%)    PHYSICAL EXAM:  GENERAL: NAD  HEAD:  Atraumatic, Normocephalic  EYES: EOMI, PERRLA, conjunctiva and sclera clear  NERVOUS SYSTEM:   no focal deficits   CHEST/LUNG:  bilateral rhonchi  HEART: Regular rate and rhythm; No murmurs, rubs, or gallops  ABDOMEN: Soft, Nontender, Nondistended; Bowel sounds present  EXTREMITIES:  2+ Peripheral Pulses, No clubbing, cyanosis, or edema    LABS  08-19    138  |  107  |  22<H>  ----------------------------<  112<H>  4.0   |  18  |  0.6<L>    Ca    7.3<L>      19 Aug 2024 08:00  Phos  1.7     08-19  Mg     1.6     08-19    TPro  4.4<L>  /  Alb  2.3<L>  /  TBili  0.4  /  DBili  x   /  AST  19  /  ALT  26  /  AlkPhos  108  08-19                          12.0   17.25 )-----------( 238      ( 19 Aug 2024 08:00 )             36.8     FluA/FluB/RSV/COVID PCR (08.14.24 @ 19:10)   SARS-CoV-2 Result: Detected    < from: TTE Echo Complete w/o Contrast w/ Doppler (08.16.24 @ 07:48) >    Summary:   1. Technically limited study.   2. Left ventricular ejection fraction, by visual estimation, is 55 to   60%.   3. Normal left atrial size.   4. Mild mitral valve regurgitation.   5. Structurally normal mitral valve, with normal leaflet excursion.   6. Normal trileaflet aortic valve with normal opening.   7. Sclerotic aortic valve with normal opening.      < end of copied text >      Culture Results:   Growth in aerobic bottle: Staphylococcus epidermidis  "Susceptibilities not performed"  Direct identification is available within approximately 3-5  hours either by Blood Panel Multiplexed PCR or Direct  MALDI-TOF. Details: [email or web address] (08-14-24)  Culture Results:   Growth in anaerobic bottle: Staphylococcus epidermidis (08-14-24)    RADIOLOGY  < from: Xray Chest 1 View- PORTABLE-Routine (08.19.24 @ 08:41) >    Impression:    Bibasilar opacities,.Increased      < end of copied text >    MEDICATIONS  (STANDING):  acetaminophen  Suppository .. 650 milliGRAM(s) Rectal once  acetylcysteine 20%  Inhalation 4 milliLiter(s) Inhalation every 6 hours  albuterol/ipratropium for Nebulization 3 milliLiter(s) Nebulizer every 6 hours  cefepime   IVPB 1000 milliGRAM(s) IV Intermittent every 24 hours  chlorhexidine 2% Cloths 1 Application(s) Topical <User Schedule>  clotrimazole 1% Cream 1 Application(s) Topical every 12 hours  dexAMETHasone  Injectable 6 milliGRAM(s) IV Push daily  heparin   Injectable 5000 Unit(s) SubCutaneous every 12 hours  lactated ringers. 1000 milliLiter(s) (50 mL/Hr) IV Continuous <Continuous>  midodrine 10 milliGRAM(s) Oral every 8 hours  norepinephrine Infusion 0.15 MICROgram(s)/kG/Min (10.2 mL/Hr) IV Continuous <Continuous>    MEDICATIONS  (PRN):

## 2024-08-19 NOTE — PROGRESS NOTE ADULT - SUBJECTIVE AND OBJECTIVE BOX
--------------------------------------------------------------------------------    24 hour events/subjective:  NAD,  Re consult and F/U skin assessment           ROS: pt unable to offer    ALLERGIES & MEDICATIONS  --------------------------------------------------------------------------------  Allergies    ciprofloxacin (Unknown)  sulfa drugs (Unknown)  erythromycin (Unknown)  penicillin (Unknown)  penicillins (Unknown)    Intolerances          STANDING INPATIENT MEDICATIONS    acetaminophen  Suppository .. 650 milliGRAM(s) Rectal once  acetylcysteine 20%  Inhalation 4 milliLiter(s) Inhalation every 6 hours  albuterol/ipratropium for Nebulization 3 milliLiter(s) Nebulizer every 6 hours  cefepime   IVPB 1000 milliGRAM(s) IV Intermittent every 24 hours  chlorhexidine 2% Cloths 1 Application(s) Topical <User Schedule>  clotrimazole 1% Cream 1 Application(s) Topical every 12 hours  dexAMETHasone  Injectable 6 milliGRAM(s) IV Push daily  heparin   Injectable 5000 Unit(s) SubCutaneous every 12 hours  lactated ringers. 1000 milliLiter(s) IV Continuous <Continuous>  midodrine 10 milliGRAM(s) Oral every 8 hours  norepinephrine Infusion 0.15 MICROgram(s)/kG/Min IV Continuous <Continuous>      PRN INPATIENT MEDICATION        VITALS/PHYSICAL EXAM-   --------------------------------------------------------------------------------  T(C): 35.4 (08-19-24 @ 11:00), Max: 36.8 (08-19-24 @ 06:00)  HR: 69 (08-19-24 @ 14:00) (50 - 90)  BP: 126/58 (08-19-24 @ 14:00) (85/44 - 134/66)  RR: 18 (08-19-24 @ 14:00) (17 - 38)  SpO2: 99% (08-19-24 @ 14:00) (90% - 100%)  Wt(kg): --        08-18-24 @ 07:01  -  08-19-24 @ 07:00  --------------------------------------------------------  IN: 2153 mL / OUT: 2115 mL / NET: 38 mL    08-19-24 @ 07:01  -  08-19-24 @ 14:38  --------------------------------------------------------  IN: 821 mL / OUT: 900 mL / NET: -79 mL            LABS/ CULTURES/ RADIOLOGY:              12.0   17.25 >-----------<  238      [08-19-24 @ 08:00]              36.8     138  |  107  |  22  ----------------------------<  112      [08-19-24 @ 08:00]  4.0   |  18  |  0.6        Ca     7.3     [08-19-24 @ 08:00]      Mg     1.6     [08-19-24 @ 08:00]      Phos  1.7     [08-19-24 @ 08:00]    TPro  4.4  /  Alb  2.3  /  TBili  0.4  /  DBili  x   /  AST  19  /  ALT  26  /  AlkPhos  108  [08-19-24 @ 08:00]          Blood Gas Arterial - Calcium, Ionized: 1.10 mmol/L (08-18-24 @ 16:00)      CAPILLARY BLOOD GLUCOSE      POCT Blood Glucose.: 182 mg/dL (18 Aug 2024 17:26)                  Culture - Blood (collected 08-14-24 @ 08:30)  Source: .Blood Blood-Peripheral  Gram Stain (08-15-24 @ 11:46):    Growth in aerobic bottle: Gram Positive Cocci in Clusters  Final Report (08-16-24 @ 10:36):    Growth in aerobic bottle: Staphylococcus epidermidis    "Susceptibilities not performed"    Direct identification is available within approximately 3-5    hours either by Blood Panel Multiplexed PCR or Direct    MALDI-TOF. Details: [email or web address]  Organism: Blood Culture PCR (08-16-24 @ 10:36)  Organism: Blood Culture PCR (08-16-24 @ 10:36)      -  Staphylococcus epidermidis, Methicillin resistant: Detec      Method Type: PCR    Culture - Blood (collected 08-14-24 @ 08:30)  Source: .Blood Blood-Peripheral  Gram Stain (08-15-24 @ 14:00):    Growth in anaerobic bottle: Gram Positive Cocci in Clusters  Final Report (08-17-24 @ 12:44):    Growth in anaerobic bottle: Staphylococcus epidermidis  Organism: Staphylococcus epidermidis (08-17-24 @ 12:44)  Organism: Staphylococcus epidermidis (08-17-24 @ 12:44)      -  Clindamycin: S <=0.25      -  Oxacillin: S <=0.25      -  Gentamicin: S <=1 Should not be used as monotherapy      -  Vancomycin: S 1      -  Tetracycline: S <=1      Method Type: RAPHAEL      -  Penicillin: R 1      -  Rifampin: S <=1 Should not be used as monotherapy      -  Erythromycin: R >4      -  Trimethoprim/Sulfamethoxazole: R >2/38

## 2024-08-19 NOTE — PROGRESS NOTE ADULT - SUBJECTIVE AND OBJECTIVE BOX
Patient is a 74y old  Female who presents with a chief complaint of septic shock (18 Aug 2024 17:07)        Over Night Events:        ROS:     All ROS are negative except HPI         PHYSICAL EXAM    ICU Vital Signs Last 24 Hrs  T(C): 36.7 (19 Aug 2024 07:01), Max: 36.8 (19 Aug 2024 06:00)  T(F): 98.1 (19 Aug 2024 07:01), Max: 98.2 (19 Aug 2024 06:00)  HR: 74 (19 Aug 2024 06:00) (56 - 90)  BP: 91/45 (19 Aug 2024 06:00) (87/48 - 126/56)  BP(mean): 65 (19 Aug 2024 06:00) (61 - 81)  ABP: --  ABP(mean): --  RR: 22 (19 Aug 2024 07:01) (17 - 37)  SpO2: 98% (19 Aug 2024 06:00) (93% - 100%)    O2 Parameters below as of 19 Aug 2024 06:00  Patient On (Oxygen Delivery Method): BiPAP/CPAP    O2 Concentration (%): 50        CONSTITUTIONAL:  Well nourished.  NAD    ENT:   Airway patent,   Mouth with normal mucosa.   No thrush    EYES:   Pupils equal,   Round and reactive to light.    CARDIAC:   Normal rate,   Regular rhythm.    No edema      Vascular:  Normal systolic impulse  No Carotid bruits    RESPIRATORY:   No wheezing  Bilateral BS  Normal chest expansion  Not tachypneic,  No use of accessory muscles    GASTROINTESTINAL:  Abdomen soft,   Non-tender,   No guarding,   + BS    MUSCULOSKELETAL:   Range of motion is not limited,  No clubbing, cyanosis    NEUROLOGICAL:   Alert and oriented   No motor  deficits.    SKIN:   Skin normal color for race,   Warm and dry and intact.   No evidence of rash.    PSYCHIATRIC:   Normal mood and affect.   No apparent risk to self or others.    HEMATOLOGICAL:  No cervical  lymphadenopathy.  no inguinal lymphadenopathy      08-18-24 @ 07:01  -  08-19-24 @ 07:00  --------------------------------------------------------  IN:    dextrose 5%: 2100 mL    Norepinephrine: 53 mL  Total IN: 2153 mL    OUT:    Indwelling Catheter - Urethral (mL): 2115 mL  Total OUT: 2115 mL    Total NET: 38 mL          LABS:                            11.0   15.27 )-----------( 229      ( 18 Aug 2024 06:23 )             34.6                                               08-18    142  |  110  |  36<H>  ----------------------------<  195<H>  3.4<L>   |  18  |  0.8    Ca    7.5<L>      18 Aug 2024 06:23  Phos  2.6     08-18  Mg     2.0     08-18    TPro  4.2<L>  /  Alb  2.1<L>  /  TBili  0.4  /  DBili  x   /  AST  19  /  ALT  27  /  AlkPhos  92  08-18                                             Urinalysis Basic - ( 18 Aug 2024 06:23 )    Color: x / Appearance: x / SG: x / pH: x  Gluc: 195 mg/dL / Ketone: x  / Bili: x / Urobili: x   Blood: x / Protein: x / Nitrite: x   Leuk Esterase: x / RBC: x / WBC x   Sq Epi: x / Non Sq Epi: x / Bacteria: x                                                  LIVER FUNCTIONS - ( 18 Aug 2024 06:23 )  Alb: 2.1 g/dL / Pro: 4.2 g/dL / ALK PHOS: 92 U/L / ALT: 27 U/L / AST: 19 U/L / GGT: x                                                                                                                                   ABG - ( 18 Aug 2024 16:00 )  pH, Arterial: 7.48  pH, Blood: x     /  pCO2: 24    /  pO2: 109   / HCO3: 18    / Base Excess: -4.1  /  SaO2: 99.1                MEDICATIONS  (STANDING):  acetaminophen  Suppository .. 650 milliGRAM(s) Rectal once  acetylcysteine 20%  Inhalation 4 milliLiter(s) Inhalation every 6 hours  albuterol/ipratropium for Nebulization 3 milliLiter(s) Nebulizer every 6 hours  cefepime   IVPB 1000 milliGRAM(s) IV Intermittent every 24 hours  chlorhexidine 2% Cloths 1 Application(s) Topical <User Schedule>  clotrimazole 1% Cream 1 Application(s) Topical every 12 hours  dexAMETHasone  Injectable 6 milliGRAM(s) IV Push daily  dextrose 5%. 1000 milliLiter(s) (100 mL/Hr) IV Continuous <Continuous>  heparin   Injectable 5000 Unit(s) SubCutaneous every 12 hours  midodrine 10 milliGRAM(s) Oral every 8 hours  norepinephrine Infusion 0.15 MICROgram(s)/kG/Min (10.2 mL/Hr) IV Continuous <Continuous>  pantoprazole  Injectable 40 milliGRAM(s) IV Push daily  remdesivir  IVPB 100 milliGRAM(s) IV Intermittent every 24 hours  remdesivir  IVPB   IV Intermittent     MEDICATIONS  (PRN):      New X-rays reviewed:                                                                                  ECHO    CXR interpreted by me:       Patient is a 74y old  Female who presents with a chief complaint of septic shock (18 Aug 2024 17:07)        Over Night Events:  on high flow   off levo         ROS:     All ROS are negative except HPI         PHYSICAL EXAM    ICU Vital Signs Last 24 Hrs  T(C): 36.7 (19 Aug 2024 07:01), Max: 36.8 (19 Aug 2024 06:00)  T(F): 98.1 (19 Aug 2024 07:01), Max: 98.2 (19 Aug 2024 06:00)  HR: 74 (19 Aug 2024 06:00) (56 - 90)  BP: 91/45 (19 Aug 2024 06:00) (87/48 - 126/56)  BP(mean): 65 (19 Aug 2024 06:00) (61 - 81)  ABP: --  ABP(mean): --  RR: 22 (19 Aug 2024 07:01) (17 - 37)  SpO2: 98% (19 Aug 2024 06:00) (93% - 100%)    O2 Parameters below as of 19 Aug 2024 06:00  Patient On (Oxygen Delivery Method): BiPAP/CPAP    O2 Concentration (%): 50        CONSTITUTIONAL:  Well nourished.  NAD    ENT:   Airway patent,   Mouth with normal mucosa.   No thrush    EYES:   Pupils equal,   Round and reactive to light.    CARDIAC:   Normal rate,   Regular rhythm.    No edema      Vascular:  Normal systolic impulse  No Carotid bruits    RESPIRATORY:   No wheezing  Bilateral BS  Normal chest expansion  Not tachypneic,  No use of accessory muscles    GASTROINTESTINAL:  Abdomen soft,   Non-tender,   No guarding,   + BS    MUSCULOSKELETAL:   Range of motion is not limited,  No clubbing, cyanosis    NEUROLOGICAL:   Alert and oriented   No motor  deficits.    SKIN:   Skin normal color for race,   Warm and dry and intact.   No evidence of rash.    PSYCHIATRIC:   Normal mood and affect.   No apparent risk to self or others.    HEMATOLOGICAL:  No cervical  lymphadenopathy.  no inguinal lymphadenopathy      08-18-24 @ 07:01  -  08-19-24 @ 07:00  --------------------------------------------------------  IN:    dextrose 5%: 2100 mL    Norepinephrine: 53 mL  Total IN: 2153 mL    OUT:    Indwelling Catheter - Urethral (mL): 2115 mL  Total OUT: 2115 mL    Total NET: 38 mL          LABS:                            11.0   15.27 )-----------( 229      ( 18 Aug 2024 06:23 )             34.6                                               08-18    142  |  110  |  36<H>  ----------------------------<  195<H>  3.4<L>   |  18  |  0.8    Ca    7.5<L>      18 Aug 2024 06:23  Phos  2.6     08-18  Mg     2.0     08-18    TPro  4.2<L>  /  Alb  2.1<L>  /  TBili  0.4  /  DBili  x   /  AST  19  /  ALT  27  /  AlkPhos  92  08-18                                             Urinalysis Basic - ( 18 Aug 2024 06:23 )    Color: x / Appearance: x / SG: x / pH: x  Gluc: 195 mg/dL / Ketone: x  / Bili: x / Urobili: x   Blood: x / Protein: x / Nitrite: x   Leuk Esterase: x / RBC: x / WBC x   Sq Epi: x / Non Sq Epi: x / Bacteria: x                                                  LIVER FUNCTIONS - ( 18 Aug 2024 06:23 )  Alb: 2.1 g/dL / Pro: 4.2 g/dL / ALK PHOS: 92 U/L / ALT: 27 U/L / AST: 19 U/L / GGT: x                                                                                                                                   ABG - ( 18 Aug 2024 16:00 )  pH, Arterial: 7.48  pH, Blood: x     /  pCO2: 24    /  pO2: 109   / HCO3: 18    / Base Excess: -4.1  /  SaO2: 99.1                MEDICATIONS  (STANDING):  acetaminophen  Suppository .. 650 milliGRAM(s) Rectal once  acetylcysteine 20%  Inhalation 4 milliLiter(s) Inhalation every 6 hours  albuterol/ipratropium for Nebulization 3 milliLiter(s) Nebulizer every 6 hours  cefepime   IVPB 1000 milliGRAM(s) IV Intermittent every 24 hours  chlorhexidine 2% Cloths 1 Application(s) Topical <User Schedule>  clotrimazole 1% Cream 1 Application(s) Topical every 12 hours  dexAMETHasone  Injectable 6 milliGRAM(s) IV Push daily  dextrose 5%. 1000 milliLiter(s) (100 mL/Hr) IV Continuous <Continuous>  heparin   Injectable 5000 Unit(s) SubCutaneous every 12 hours  midodrine 10 milliGRAM(s) Oral every 8 hours  norepinephrine Infusion 0.15 MICROgram(s)/kG/Min (10.2 mL/Hr) IV Continuous <Continuous>  pantoprazole  Injectable 40 milliGRAM(s) IV Push daily  remdesivir  IVPB 100 milliGRAM(s) IV Intermittent every 24 hours  remdesivir  IVPB   IV Intermittent     MEDICATIONS  (PRN):      New X-rays reviewed:                                                                                  ECHO    CXR interpreted by me:

## 2024-08-19 NOTE — PROGRESS NOTE ADULT - SUBJECTIVE AND OBJECTIVE BOX
HPI: 73 yo F with severe dementia bedbound aox0 presents by ambulance for increased work of breathing, cough, and decreased responsiveness at home.     INTERVAL EVENTS  8/16: patient remains on HFNC and unable to participate in exam    ADVANCE DIRECTIVES:    [ ] Full Code [ x] DNR  MOLST  [ ]  Living Will  [ ]   DECISION MAKER(s):  [ ] Health Care Proxy(s)  [x ] Surrogate(s)  [ ] Guardian           Name(s): Phone Number(s): Children including son Gerardo     BASELINE (I)ADL(s) (prior to admission):  [place]: [ ]Total  [ ] Moderate [ ]Dependent  Palliative Performance Status Version 2:         %    http://npcrc.org/files/news/palliative_performance_scale_ppsv2.pdf    Allergies    ciprofloxacin (Unknown)  sulfa drugs (Unknown)  erythromycin (Unknown)  penicillin (Unknown)  penicillins (Unknown)    Intolerances    MEDICATIONS  (STANDING):  acetaminophen  Suppository .. 650 milliGRAM(s) Rectal once  acetylcysteine 20%  Inhalation 4 milliLiter(s) Inhalation every 6 hours  albuterol/ipratropium for Nebulization 3 milliLiter(s) Nebulizer every 6 hours  cefepime   IVPB 1000 milliGRAM(s) IV Intermittent every 24 hours  chlorhexidine 2% Cloths 1 Application(s) Topical <User Schedule>  clotrimazole 1% Cream 1 Application(s) Topical every 12 hours  dexAMETHasone  Injectable 6 milliGRAM(s) IV Push daily  heparin   Injectable 5000 Unit(s) SubCutaneous every 12 hours  lactated ringers. 1000 milliLiter(s) (50 mL/Hr) IV Continuous <Continuous>  midodrine 10 milliGRAM(s) Oral every 8 hours  norepinephrine Infusion 0.15 MICROgram(s)/kG/Min (10.2 mL/Hr) IV Continuous <Continuous>    MEDICATIONS  (PRN):      PRESENT SYMPTOMS: [x ]Unable to obtain due to poor mentation   Source if other than patient:  [ ]Family   [ ]Team     Pain: [ ]yes [ ]no  QOL impact -   Location -                    Aggravating factors -  Quality -  Radiation -  Timing-  Severity (0-10 scale):  Minimal acceptable level (0-10 scale):     CPOT:    https://www.[email or web address]/getattachment/[email or web address]/Critical-Care-Pain-Observation-Tool-(CPOT)    PAIN AD Score:   http://[email or web address]/cog/painad.pdf (press ctrl +  left click to view)    Dyspnea:                           [ ]None[ ]Mild [ ]Moderate [ ]Severe     Respiratory Distress Observation Scale (RDOS):   A score of 0 to 2 signifies little or no respiratory distress, 3 signifies mild distress, scores 4 to 6 indicate moderate distress, and scores greater than 7 signify severe distress  https://[email or web address]/sites/default/files/PDFS/561059-snxcaygtteg-pkgnuyid-aycsavdujvo-aqozn.pdf    Anxiety:                             [ ]None[ ]Mild [ ]Moderate [ ]Severe   Fatigue:                             [ ]None[ ]Mild [ ]Moderate [ ]Severe   Nausea:                             [ ]None[ ]Mild [ ]Moderate [ ]Severe   Loss of appetite:              [ ]None[ ]Mild [ ]Moderate [ ]Severe   Constipation:                    [ ]None[ ]Mild [ ]Moderate [ ]Severe    Other Symptoms:  [ ]All other review of systems negative     Palliative Performance Status Version 2:        30 %    http://npcrc.org/files/news/palliative_performance_scale_ppsv2.pdf  PHYSICAL EXAM:  =Vital Signs Last 24 Hrs  T(C): 35.4 (19 Aug 2024 15:00), Max: 36.8 (19 Aug 2024 06:00)  T(F): 95.7 (19 Aug 2024 15:00), Max: 98.2 (19 Aug 2024 06:00)  HR: 74 (19 Aug 2024 18:00) (50 - 90)  BP: 116/53 (19 Aug 2024 18:00) (85/44 - 134/66)  BP(mean): 76 (19 Aug 2024 18:00) (61 - 92)  RR: 18 (19 Aug 2024 18:00) (15 - 118)  SpO2: 96% (19 Aug 2024 18:00) (90% - 100%)    Parameters below as of 19 Aug 2024 18:00  Patient On (Oxygen Delivery Method): nasal cannula, high flow  O2 Flow (L/min): 40  O2 Concentration (%): 45    GENERAL:  [X ] No acute distress [ ]Lethargic  [ ]Unarousable  [ ]Verbal  [ ]Non-Verbal [ ]Cachexia    BEHAVIORAL/PSYCH:  [ ]Alert and Oriented x  [ ] Anxiety [ ] Delirium [ ] Agitation [X ] Calm   EYES: [ ] No scleral icterus [ ] Scleral icterus [ ] Closed  ENMT:  [ ]Dry mouth  [ ]No external oral lesions [ ] No external ear or nose lesions  CARDIOVASCULAR:  [ ]Regular [ ]Irregular [ ]Tachy [ ]Not Tachy  [ ]Justice [ ] Edema [ ] No edema  PULMONARY:  [ ]Tachypnea  [ ]Audible excessive secretions [X ] No labored breathing [ ] labored breathing  GASTROINTESTINAL: [ ]Soft  [ ]Distended  [ X]Not distended [ ]Non tender [ ]Tender  MUSCULOSKELETAL: [ ]No clubbing [ ] clubbing  [ ] No cyanosis [ ] cyanosis  NEUROLOGIC: [ ]No focal deficits  [ ]Follows commands  [ ]Does not follow commands  [x ]Cognitive impairment  [ ]Dysphagia  [ ]Dysarthria  [ ]Paresis   SKIN: [ ] Jaundiced [X ] Non-jaundiced [ ]Rash [ ]No Rash [ ] Warm [ ] Dry  MISC/LINES: [ ] ET tube [ ] Trach [ ]NGT/OGT [ ]PEG [ ]Fischer    LABS: reviewed by me, leukocytosis                                   12.0   17.25 )-----------( 238      ( 19 Aug 2024 08:00 )             36.8       08-19    138  |  107  |  22<H>  ----------------------------<  112<H>  4.0   |  18  |  0.6<L>    Ca    7.3<L>      19 Aug 2024 08:00  Phos  1.7     08-19  Mg     1.6     08-19    TPro  4.4<L>  /  Alb  2.3<L>  /  TBili  0.4  /  DBili  x   /  AST  19  /  ALT  26  /  AlkPhos  108  08-19          ABG - ( 18 Aug 2024 16:00 )  pH, Arterial: 7.48  pH, Blood: x     /  pCO2: 24    /  pO2: 109   / HCO3: 18    / Base Excess: -4.1  /  SaO2: 99.1                Urinalysis Basic - ( 19 Aug 2024 08:00 )    Color: x / Appearance: x / SG: x / pH: x  Gluc: 112 mg/dL / Ketone: x  / Bili: x / Urobili: x   Blood: x / Protein: x / Nitrite: x   Leuk Esterase: x / RBC: x / WBC x   Sq Epi: x / Non Sq Epi: x / Bacteria: x                  CAPILLARY BLOOD GLUCOSE      POCT Blood Glucose.: 182 mg/dL (18 Aug 2024 17:26)                RADIOLOGY & ADDITIONAL STUDIES: reviewed by me    PROTEIN CALORIE MALNUTRITION PRESENT: [ ]mild [ ]moderate [ ]severe [ ]underweight [ ]morbid obesity  https://www.andeal.org/vault/2440/web/files/ONC/Table_Clinical%20Characteristics%20to%20Document%20Malnutrition-White%20JV%20et%20al%202012.pdf    Height (cm): 147.3 (08-14-24 @ 07:49)  Weight (kg): 36.3 (08-14-24 @ 07:49)  BMI (kg/m2): 16.7 (08-14-24 @ 07:49)    [ ]PPSV2 < or = to 30% [ ]significant weight loss  [ ]poor nutritional intake  [ ]anasarca      [ ]Artificial Nutrition          Palliative Care Spiritual/Emotional Screening Tool Question  Severity (0-4):                    OR                    [X ] Unable to determine/NA  Score of 2 or greater indicates recommendation of Chaplaincy referral  Chaplaincy Referral: [ ] Yes [ ] Refused [ ] Following     Caregiver [place]:  [ ] Yes [ ] No    OR    [x ] Unable to determine. Will assess at later time if appropriate.  Social Work Referral [ ]  Patient and Family Centered Care Referral [ ]    Anticipatory Grief Present: [ ] Yes [ ] No    OR     [ x] Unable to determine. Will assess at later time if appropriate.  Social Work Referral [ ]  Patient and Family Centered Care Referral [ ]    REFERRALS:   [ ]Chaplaincy  [ ]Hospice  [ ]Child Life  [ ]Social Work  [ ]Case management [ ]Holistic Therapy     Palliative care education provided to patient and/or family    Goals of Care Document:     ______________

## 2024-08-19 NOTE — PROGRESS NOTE ADULT - SUBJECTIVE AND OBJECTIVE BOX
INFECTIOUS DISEASE FOLLOW UP NOTE:    Interval History/ROS: Patient is a 74y old  Female who presents with a chief complaint of septic shock (19 Aug 2024 07:58)      Overnight events:    REVIEW OF SYSTEMS:        Prior hospital charts reviewed [Yes]  Primary team notes reviewed [Yes]  Other consultant notes reviewed [Yes]    Allergies:  ciprofloxacin (Unknown)  sulfa drugs (Unknown)  erythromycin (Unknown)  penicillin (Unknown)  penicillins (Unknown)      ANTIMICROBIALS:   cefepime   IVPB 1000 every 24 hours  remdesivir  IVPB 100 every 24 hours  remdesivir  IVPB        OTHER MEDS: MEDICATIONS  (STANDING):  acetaminophen  Suppository .. 650 once  acetylcysteine 20%  Inhalation 4 every 6 hours  albuterol/ipratropium for Nebulization 3 every 6 hours  dexAMETHasone  Injectable 6 daily  heparin   Injectable 5000 every 12 hours  midodrine 10 every 8 hours  norepinephrine Infusion 0.15 <Continuous>  pantoprazole  Injectable 40 daily      Vital Signs Last 24 Hrs  T(F): 98.1 (08-19-24 @ 07:01), Max: 100.9 (08-15-24 @ 05:05)    Vital Signs Last 24 Hrs  HR: 78 (08-19-24 @ 09:00) (56 - 90)  BP: 110/54 (08-19-24 @ 09:00) (85/44 - 126/56)  RR: 38 (08-19-24 @ 09:00)  SpO2: 90% (08-19-24 @ 09:00) (90% - 100%)  Wt(kg): --    EXAM:      Labs:                        12.0   17.25 )-----------( 238      ( 19 Aug 2024 08:00 )             36.8     08-19    138  |  107  |  22<H>  ----------------------------<  112<H>  4.0   |  18  |  0.6<L>    Ca    7.3<L>      19 Aug 2024 08:00  Phos  1.7     08-19  Mg     1.6     08-19    TPro  4.4<L>  /  Alb  2.3<L>  /  TBili  0.4  /  DBili  x   /  AST  19  /  ALT  26  /  AlkPhos  108  08-19      WBC Trend:  WBC Count: 17.25 (08-19-24 @ 08:00)  WBC Count: 15.27 (08-18-24 @ 06:23)  WBC Count: 14.18 (08-17-24 @ 06:56)  WBC Count: 15.83 (08-16-24 @ 06:30)      Creatine Trend:  Creatinine: 0.6 (08-19)  Creatinine: 0.8 (08-18)  Creatinine: 1.0 (08-17)  Creatinine: 1.3 (08-16)      Liver Biochemical Testing Trend:  Alanine Aminotransferase (ALT/SGPT): 26 (08-19)  Alanine Aminotransferase (ALT/SGPT): 27 (08-18)  Alanine Aminotransferase (ALT/SGPT): 30 (08-17)  Alanine Aminotransferase (ALT/SGPT): 32 (08-16)  Alanine Aminotransferase (ALT/SGPT): 32 (08-15)  Aspartate Aminotransferase (AST/SGOT): 19 (08-19-24 @ 08:00)  Aspartate Aminotransferase (AST/SGOT): 19 (08-18-24 @ 06:23)  Aspartate Aminotransferase (AST/SGOT): 20 (08-17-24 @ 06:56)  Aspartate Aminotransferase (AST/SGOT): 24 (08-16-24 @ 06:30)  Aspartate Aminotransferase (AST/SGOT): 25 (08-15-24 @ 21:50)  Bilirubin Total: 0.4 (08-19)  Bilirubin Total: 0.4 (08-18)  Bilirubin Total: 0.4 (08-17)  Bilirubin Total: 0.5 (08-16)  Bilirubin Total: 0.5 (08-15)      Trend LDH      Urinalysis Basic - ( 19 Aug 2024 08:00 )    Color: x / Appearance: x / SG: x / pH: x  Gluc: 112 mg/dL / Ketone: x  / Bili: x / Urobili: x   Blood: x / Protein: x / Nitrite: x   Leuk Esterase: x / RBC: x / WBC x   Sq Epi: x / Non Sq Epi: x / Bacteria: x        MICROBIOLOGY:  Vancomycin Level, Random: 33.8 (08-16 @ 06:30)  Vancomycin Level, Random: 12.1 (08-15 @ 10:53)  Vancomycin Level, Trough: 15.9 (08-15 @ 07:00)    MRSA PCR Result.: Negative (08-14-24 @ 19:10)      Urinalysis with Rflx Culture (collected 14 Aug 2024 08:40)    Culture - Blood (collected 14 Aug 2024 08:30)  Source: .Blood Blood-Peripheral  Final Report:    Growth in aerobic bottle: Staphylococcus epidermidis    "Susceptibilities not performed"    Direct identification is available within approximately 3-5    hours either by Blood Panel Multiplexed PCR or Direct    MALDI-TOF. Details: [email or web address]  Organism: Blood Culture PCR  Organism: Blood Culture PCR    Sensitivities:      -  Staphylococcus epidermidis, Methicillin resistant: Detec      Method Type: PCR    Culture - Blood (collected 14 Aug 2024 08:30)  Source: .Blood Blood-Peripheral  Final Report:    Growth in anaerobic bottle: Staphylococcus epidermidis  Organism: Staphylococcus epidermidis  Organism: Staphylococcus epidermidis    Sensitivities:      -  Clindamycin: S <=0.25      -  Oxacillin: S <=0.25      -  Gentamicin: S <=1 Should not be used as monotherapy      -  Vancomycin: S 1      -  Tetracycline: S <=1      Method Type: RAPHAEL      -  Penicillin: R 1      -  Rifampin: S <=1 Should not be used as monotherapy      -  Erythromycin: R >4      -  Trimethoprim/Sulfamethoxazole: R >2/38    Troponin T, High Sensitivity Result: 140 (08-14)  Troponin T, High Sensitivity Result: 142 (08-14)  Troponin T, High Sensitivity Result: 214 (08-14)    Blood Gas Arterial, Lactate: 2.4 (08-18 @ 16:00)      RADIOLOGY:  imaging below personally reviewed   INFECTIOUS DISEASE FOLLOW UP NOTE:    Interval History/ROS: Patient is a 74y old  Female who presents with a chief complaint of septic shock (19 Aug 2024 07:58)    Overnight events: On HFNC, opening her eyes. More awake.    REVIEW OF SYSTEMS:  Unable to provide due to cognitive impairment      Prior hospital charts reviewed [Yes]  Primary team notes reviewed [Yes]  Other consultant notes reviewed [Yes]    Allergies:  ciprofloxacin (Unknown)  sulfa drugs (Unknown)  erythromycin (Unknown)  penicillin (Unknown)  penicillins (Unknown)      ANTIMICROBIALS:   cefepime   IVPB 1000 every 24 hours  remdesivir  IVPB 100 every 24 hours  remdesivir  IVPB        OTHER MEDS: MEDICATIONS  (STANDING):  acetaminophen  Suppository .. 650 once  acetylcysteine 20%  Inhalation 4 every 6 hours  albuterol/ipratropium for Nebulization 3 every 6 hours  dexAMETHasone  Injectable 6 daily  heparin   Injectable 5000 every 12 hours  midodrine 10 every 8 hours  norepinephrine Infusion 0.15 <Continuous>  pantoprazole  Injectable 40 daily      Vital Signs Last 24 Hrs  T(F): 98.1 (08-19-24 @ 07:01), Max: 100.9 (08-15-24 @ 05:05)    Vital Signs Last 24 Hrs  HR: 78 (08-19-24 @ 09:00) (56 - 90)  BP: 110/54 (08-19-24 @ 09:00) (85/44 - 126/56)  RR: 38 (08-19-24 @ 09:00)  SpO2: 90% (08-19-24 @ 09:00) (90% - 100%)  Wt(kg): --    EXAM:  GENERAL: NAD, lying in bed. very frail and weak  HEAD: No head lesions  EYES: Conjunctiva pink and cornea white  EAR, NOSE, MOUTH, THROAT: Normal external ears and nose, no discharges; dry mucous membranes; + HFNC  NECK: Supple, no JVD  RESPIRATORY: Diffuse rhonchi  CARDIOVASCULAR: S1 S2, tachycardia  GASTROINTESTINAL: Soft, nontender, nondistended; normoactive bowel sounds  GENITOURINARY: + trujillo catheter  EXTREMITIES: Severely contracted extremities  NERVOUS SYSTEM: Not responsive, not oriented  MUSCULOSKELETAL: No joint erythema, swelling  SKIN: Sacral ulcer stage 2, no purulence. no superficial thrombophlebitis  PSYCH: Lethargic    Labs:                        12.0   17.25 )-----------( 238      ( 19 Aug 2024 08:00 )             36.8     08-19    138  |  107  |  22<H>  ----------------------------<  112<H>  4.0   |  18  |  0.6<L>    Ca    7.3<L>      19 Aug 2024 08:00  Phos  1.7     08-19  Mg     1.6     08-19    TPro  4.4<L>  /  Alb  2.3<L>  /  TBili  0.4  /  DBili  x   /  AST  19  /  ALT  26  /  AlkPhos  108  08-19      WBC Trend:  WBC Count: 17.25 (08-19-24 @ 08:00)  WBC Count: 15.27 (08-18-24 @ 06:23)  WBC Count: 14.18 (08-17-24 @ 06:56)  WBC Count: 15.83 (08-16-24 @ 06:30)      Creatine Trend:  Creatinine: 0.6 (08-19)  Creatinine: 0.8 (08-18)  Creatinine: 1.0 (08-17)  Creatinine: 1.3 (08-16)      Liver Biochemical Testing Trend:  Alanine Aminotransferase (ALT/SGPT): 26 (08-19)  Alanine Aminotransferase (ALT/SGPT): 27 (08-18)  Alanine Aminotransferase (ALT/SGPT): 30 (08-17)  Alanine Aminotransferase (ALT/SGPT): 32 (08-16)  Alanine Aminotransferase (ALT/SGPT): 32 (08-15)  Aspartate Aminotransferase (AST/SGOT): 19 (08-19-24 @ 08:00)  Aspartate Aminotransferase (AST/SGOT): 19 (08-18-24 @ 06:23)  Aspartate Aminotransferase (AST/SGOT): 20 (08-17-24 @ 06:56)  Aspartate Aminotransferase (AST/SGOT): 24 (08-16-24 @ 06:30)  Aspartate Aminotransferase (AST/SGOT): 25 (08-15-24 @ 21:50)  Bilirubin Total: 0.4 (08-19)  Bilirubin Total: 0.4 (08-18)  Bilirubin Total: 0.4 (08-17)  Bilirubin Total: 0.5 (08-16)  Bilirubin Total: 0.5 (08-15)      Trend LDH      Urinalysis Basic - ( 19 Aug 2024 08:00 )    Color: x / Appearance: x / SG: x / pH: x  Gluc: 112 mg/dL / Ketone: x  / Bili: x / Urobili: x   Blood: x / Protein: x / Nitrite: x   Leuk Esterase: x / RBC: x / WBC x   Sq Epi: x / Non Sq Epi: x / Bacteria: x        MICROBIOLOGY:  Vancomycin Level, Random: 33.8 (08-16 @ 06:30)  Vancomycin Level, Random: 12.1 (08-15 @ 10:53)  Vancomycin Level, Trough: 15.9 (08-15 @ 07:00)    MRSA PCR Result.: Negative (08-14-24 @ 19:10)      Urinalysis with Rflx Culture (collected 14 Aug 2024 08:40)    Culture - Blood (collected 14 Aug 2024 08:30)  Source: .Blood Blood-Peripheral  Final Report:    Growth in aerobic bottle: Staphylococcus epidermidis    "Susceptibilities not performed"    Direct identification is available within approximately 3-5    hours either by Blood Panel Multiplexed PCR or Direct    MALDI-TOF. Details: [email or web address]  Organism: Blood Culture PCR  Organism: Blood Culture PCR    Sensitivities:      -  Staphylococcus epidermidis, Methicillin resistant: Detec      Method Type: PCR    Culture - Blood (collected 14 Aug 2024 08:30)  Source: .Blood Blood-Peripheral  Final Report:    Growth in anaerobic bottle: Staphylococcus epidermidis  Organism: Staphylococcus epidermidis  Organism: Staphylococcus epidermidis    Sensitivities:      -  Clindamycin: S <=0.25      -  Oxacillin: S <=0.25      -  Gentamicin: S <=1 Should not be used as monotherapy      -  Vancomycin: S 1      -  Tetracycline: S <=1      Method Type: RAPHAEL      -  Penicillin: R 1      -  Rifampin: S <=1 Should not be used as monotherapy      -  Erythromycin: R >4      -  Trimethoprim/Sulfamethoxazole: R >2/38    Troponin T, High Sensitivity Result: 140 (08-14)  Troponin T, High Sensitivity Result: 142 (08-14)  Troponin T, High Sensitivity Result: 214 (08-14)    Blood Gas Arterial, Lactate: 2.4 (08-18 @ 16:00)      RADIOLOGY:  imaging below personally reviewed    < from: Xray Chest 1 View- PORTABLE-Routine (08.19.24 @ 08:41) >  Impression:    Bibasilar opacities,.Increased    < end of copied text >

## 2024-08-19 NOTE — PROGRESS NOTE ADULT - ASSESSMENT
73 yo F with severe dementia bedbound aox0 presents by ambulance for increased work of breathing, cough, and decreased responsiveness at home.    - c/w IV antibiotics, IV steroids  - c/w IV pressors  - on HFNC  - d/w son, see above  - family interested in NGT trial for now, but not PEG  - d/w son Sergio, who has been in contact with siblings regarding decisions  - d/w team

## 2024-08-19 NOTE — PROGRESS NOTE ADULT - ASSESSMENT
74F with severe dementia bedbound aox 0 presents by ambulance for increased work of breathing, cough, and decreased responsiveness at home and found with COVID and pneumonia     acute respiratory failure / COVID / possible gram negative pneumonia     - 5 days RDV   - complete 10 days of decadron   - Cefepime   - DVT prophylaxis   - poor prognosis   - check procal   - ID f/u

## 2024-08-19 NOTE — PROGRESS NOTE ADULT - ASSESSMENT
74F with severe dementa bedbound aox0 presents by ambulance for increased work of breathing, cough, and decreased responsiveness at home.    ID is consulted for sepsis  Hypothermia to 94.5, WBC 11.03  Hypoxemia requiring NRB  Lactate 6.8 > 4.1  BCx 8/14 CoNS, likely procurement contamination  UA WBC 2  COVID +    CXR with Bibasilar opacities  CT A/P wo contrast 8/14  Right basilar pleural effusion with adjacent atelectasis/consolidation.   Apparent occlusion of the right lower lobe bronchus causing atelectasis.   Mild left basilar atelectasis/consolidation. Infection is not excluded.   Consider interval follow-up chest CT with IV contrast for further   evaluation.    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  Attending Physician  Division of Infectious Diseases  [facility]  Please contact me via [employer] Teams

## 2024-08-19 NOTE — PROGRESS NOTE ADULT - ASSESSMENT
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 buttock ( present on admission documented in error as stage four )  Size: ~3x2  Tissue Description Yellow devitalised skin tissue , macerated   No odor, erythema, increased warmth, tenderness, induration, fluctuance, nor crepitus :   Wound Exudate : Scant with dressing removal    Wound Edge: Irregular   Periwound Condition : Macerated            Wound and skin care recs  Continue current treatment    Clean wound with soap and water, pat dry apply triad with Allevyn foam dressing for autolytic wound debridement   Pressure  injury  preventive  measures  Skin  and incontinence care   Assess wound and inform primary provider of any changes   Case discussed with primary Rn  Wound/ ostomy specialist  to f/u as needed     Offloading: [ x] Frequent position changes [x ] Devices/Equipment  Cleansing: [ ] Saline [ ] Soap/Water [ ] Other: ______  Topicals: [ ] Barrier Cream [ ] Antimicrobial [x ] autolytic  Wound Debridement [x] Moist  wound Healing   Dressings: [ ] Dry, sterile [ ] Allevyn  Foam [ ] Absorbant Pads [ ] Collagenase    Other Recs.   Per Primary team   Total time for bedside assessment  , review of medical records  and  discussion of plan of care with primary team greater than 35 min

## 2024-08-19 NOTE — PROGRESS NOTE ADULT - ASSESSMENT
Impression:   Sepsis  Bacteremia staph epi contaminant   severe end stage dementia   DEIRDRE   Transaminitis   Hypernatremia from dehydration   Dysphagia   Aspiration pneumonia   COVID positive       PLAN:    CNS: AMS, dementia is advanced at baseline.     HEENT: Oral care    PULMONARY:  HOB @ 45 degrees. CXR with likely aspiration pneumonia.  NIV as needed   Low threshold to intubate.    CARDIOVASCULAR: Needs maintenance fluids. Goal MAP more than 60mmhg.   wean pressors.   Echo noted     GI: GI prophylaxis.  Feeding: cannot tolerate PO.   NGT.   BMs routine    RENAL:  Follow up lytes.  Correct as needed. DEIRDRE noted. No hydro on CT. c/w LR     INFECTIOUS DISEASE: Blood culture with gram positive cocci in clusters.   Echo TTE.  abx per  ID     HEMATOLOGICAL:  DVT prophylaxis. Duplex LEs. cannot be done due to contracted     ENDOCRINE:  Follow up FS.  Insulin protocol if needed.    MUSCULOSKELETAL: Bedbound at baseline.    Prognosis is dismal; comfort measures conversation should be revisited.     [facility] Palliative consult     hospice appropriate    The patient is critically ill with a high probability of deterioration.             Impression:   Sepsis  Bacteremia staph epi contaminant   severe end stage dementia   DEIRDRE   Transaminitis   Hypernatremia from dehydration   Dysphagia   Aspiration pneumonia   COVID positive       PLAN:    CNS: AMS, dementia is advanced at baseline.     HEENT: Oral care    PULMONARY:  HOB @ 45 degrees. CXR with likely aspiration pneumonia.  NIV as needed   now on high flow     CARDIOVASCULAR: Needs maintenance fluids. Goal MAP more than 60mmhg.   off pressors.   Echo noted     GI: GI prophylaxis.  Feeding: cannot tolerate PO. speech and swallow if failed NG tube    NGT.   BMs routine    RENAL:  Follow up lytes.  Correct as needed. DEIRDRE noted. No hydro on CT. c/w LR     INFECTIOUS DISEASE: Blood culture with gram positive cocci in clusters.   Echo TTE.  abx per  ID     HEMATOLOGICAL:  DVT prophylaxis. Duplex LEs. cannot be done due to contracted     ENDOCRINE:  Follow up FS.  Insulin protocol if needed.    MUSCULOSKELETAL: Bedbound at baseline.    Prognosis is dismal; comfort measures conversation should be revisited.     [facility] Palliative consult     hospice appropriate    The patient is critically ill with a high probability of deterioration.

## 2024-08-20 NOTE — CONSULT NOTE ADULT - CONSULT REASON
hypotension
Pneumonia
Bradycardia
DEIRDRE
GOC
Pressure Injury assessment and  treatment recommendation

## 2024-08-20 NOTE — PROGRESS NOTE ADULT - SUBJECTIVE AND OBJECTIVE BOX
Patient seen and evaluated this am, on high flow at 45l/min      T(F): 96.4 (08-20-24 @ 13:15), Max: 96.6 (08-19-24 @ 23:30)  HR: 52 (08-20-24 @ 13:15)  BP: 114/70 (08-20-24 @ 13:15)  RR: 20 (08-20-24 @ 13:15)  SpO2: 88% (08-20-24 @ 13:15) (88% - 100%)    PHYSICAL EXAM:  GENERAL: NAD  HEAD:  Atraumatic, Normocephalic  EYES: EOMI, PERRLA, conjunctiva and sclera clear  NERVOUS SYSTEM:  no focal deficits   CHEST/LUNG:  bilateral rhonchi  HEART: Regular rate and rhythm; No murmurs, rubs, or gallops  ABDOMEN: Soft, Nontender, Nondistended; Bowel sounds present  EXTREMITIES:  2+ Peripheral Pulses, No clubbing, cyanosis, or edema    LABS  08-20    141  |  107  |  17  ----------------------------<  118<H>  3.7   |  24  |  0.6<L>    Ca    7.4<L>      20 Aug 2024 11:00  Phos  3.1     08-20  Mg     2.1     08-20    TPro  4.2<L>  /  Alb  2.3<L>  /  TBili  0.4  /  DBili  x   /  AST  22  /  ALT  27  /  AlkPhos  109  08-20                          11.5   15.40 )-----------( 283      ( 20 Aug 2024 11:00 )             35.7       Culture Results:   Growth in aerobic bottle: Staphylococcus epidermidis  "Susceptibilities not performed"  Direct identification is available within approximately 3-5  hours either by Blood Panel Multiplexed PCR or Direct  MALDI-TOF. Details: [email or web address] (08-14-24)  Culture Results:   Growth in anaerobic bottle: Staphylococcus epidermidis (08-14-24)    RADIOLOGY  < from: Xray Chest 1 View- PORTABLE-Urgent (Xray Chest 1 View- PORTABLE-Urgent .) (08.19.24 @ 21:20) >    Impression:    Enteric tube terminates below the left hemidiaphragm.    Essentially stable bilateral opacities.    < end of copied text >    MEDICATIONS  (STANDING):  acetaminophen  Suppository .. 650 milliGRAM(s) Rectal once  acetylcysteine 20%  Inhalation 4 milliLiter(s) Inhalation every 6 hours  albuterol/ipratropium for Nebulization 3 milliLiter(s) Nebulizer every 6 hours  atropine Injectable 0.5 milliGRAM(s) IntraMuscular once  cefepime   IVPB 1000 milliGRAM(s) IV Intermittent every 24 hours  chlorhexidine 2% Cloths 1 Application(s) Topical <User Schedule>  clotrimazole 1% Cream 1 Application(s) Topical every 12 hours  heparin   Injectable 5000 Unit(s) SubCutaneous every 12 hours  lactated ringers. 1000 milliLiter(s) (50 mL/Hr) IV Continuous <Continuous>  norepinephrine Infusion 0.15 MICROgram(s)/kG/Min (10.2 mL/Hr) IV Continuous <Continuous>    MEDICATIONS  (PRN):

## 2024-08-20 NOTE — PROGRESS NOTE ADULT - ASSESSMENT
Impression:   Sepsis  Bacteremia staph epi contaminant   severe end stage dementia   DEIRDRE   Transaminitis   Hypernatremia from dehydration   Dysphagia   Aspiration pneumonia   COVID positive       PLAN:    CNS: AMS, dementia is advanced at baseline.     HEENT: Oral care    PULMONARY:  HOB @ 45 degrees. CXR with likely aspiration pneumonia.  NIV as needed   now on high flow     CARDIOVASCULAR: Needs maintenance fluids. Goal MAP more than 60mmhg.   off pressors.   Echo noted     GI: GI prophylaxis.  Feeding: cannot tolerate PO. speech and swallow if failed NG tube    NGT.   BMs routine    RENAL:  Follow up lytes.  Correct as needed. DEIRDRE noted. No hydro on CT. c/w LR     INFECTIOUS DISEASE: Blood culture with gram positive cocci in clusters.   Echo TTE.  abx per  ID     HEMATOLOGICAL:  DVT prophylaxis. Duplex LEs. cannot be done due to contracted     ENDOCRINE:  Follow up FS.  Insulin protocol if needed.    MUSCULOSKELETAL: Bedbound at baseline.    Prognosis is dismal; comfort measures conversation should be revisited.     [facility] Palliative consult     hospice appropriate    The patient is critically ill with a high probability of deterioration.             Impression:   Sepsis  Bacteremia staph epi contaminant   severe end stage dementia   DEIRDRE   Transaminitis   Hypernatremia from dehydration   Dysphagia   Aspiration pneumonia   COVID positive   Bradycardia       PLAN:    CNS: AMS, dementia is advanced at baseline.     HEENT: Oral care    PULMONARY:  HOB @ 45 degrees. CXR with likely aspiration pneumonia. On HF c/w     CARDIOVASCULAR: Needs maintenance fluids. Goal MAP more than 60mmhg. levo restarted, ekg for silvio, TSH, GOC     GI: GI prophylaxis. NG tube feeds . BMs monitor, and bowel regimen as needed     RENAL:  Follow up lytes.  Correct as needed. DEIRDRE noted. No hydro on CT. c/w LR,  blood gas     INFECTIOUS DISEASE: Blood culture with gram positive cocci in clusters. Echo TTE.  abx per  ID     HEMATOLOGICAL:  DVT prophylaxis. Duplex LEs. cannot be done due to contracted     ENDOCRINE:  Follow up FS.  Insulin protocol if needed.    MUSCULOSKELETAL: Bedbound at baseline.    Prognosis is dismal; comfort measures conversation should be revisited.     [facility] Palliative consult     hospice appropriate    The patient is critically ill with a high probability of deterioration.             Impression:   Sepsis  Bacteremia staph epi contaminant   severe end stage dementia   DEIRDRE   Transaminitis   Hypernatremia from dehydration   Dysphagia   Aspiration pneumonia   COVID positive   Bradycardia       PLAN:    CNS: AMS, dementia is advanced at baseline.     HEENT: Oral care    PULMONARY:  HOB @ 45 degrees. CXR with likely aspiration pneumonia. On HF c/w     CARDIOVASCULAR: Needs maintenance fluids. Goal MAP more than 60mmhg. levo restarted, ekg for silvio, TSH, GOC   lower midodrine to 5mg   GI: GI prophylaxis. NG tube feeds . BMs monitor, and bowel regimen as needed     RENAL:  Follow up lytes.  Correct as needed. DEIRDRE noted. No hydro on CT. c/w LR,  blood gas     INFECTIOUS DISEASE: Blood culture with gram positive cocci in clusters. Echo TTE.  abx per  ID     HEMATOLOGICAL:  DVT prophylaxis. Duplex LEs. cannot be done due to contracted     ENDOCRINE:  Follow up FS.  Insulin protocol if needed.    MUSCULOSKELETAL: Bedbound at baseline.    Prognosis is dismal; comfort measures conversation should be revisited.     [facility] Palliative consult     hospice appropriate    The patient is critically ill with a high probability of deterioration.

## 2024-08-20 NOTE — PROGRESS NOTE ADULT - ASSESSMENT
74F with severe dementa bedbound aox0 presents by ambulance for increased work of breathing, cough, and decreased responsiveness at home.    ID is consulted for sepsis  Hypothermia to 94.5, WBC 11.03  Hypoxemia requiring NRB  Lactate 6.8 > 4.1 > 2.4  BCx 8/14 CoNS, likely procurement contamination  UA WBC 2  COVID +    CXR with Bibasilar opacities  CT A/P wo contrast 8/14  Right basilar pleural effusion with adjacent atelectasis/consolidation.   Apparent occlusion of the right lower lobe bronchus causing atelectasis.   Mild left basilar atelectasis/consolidation. Infection is not excluded.   Consider interval follow-up chest CT with IV contrast for further   evaluation.    Antibiotics:  Cefepime 8/14 ->  Vancomycin 8/14      IMPRESSION:  RLL pneumonia, aspiration?  Severe COVID  Acute hypoxemic respiratory failure  Lactic acidosis  Sacral ulcer, no sign of infection  Intertrigo  Constipation  Bedbound  Dementia  Immunosuppression / Immunosenescence secondary to multiple comorbidities which could result in poor clinical outcome      RECOMMENDATIONS:  - Continue IV cefepime 1g q24hrs (CrCl <10), keep for 7-10 days  - Completed 5 days of RDV, continue IV Decadron 6mg q24hrs x 10 days  - Continue topical clotrimazole  - Bowel regimen  - Offloading and frequent position changes, aspiration precaution  - Trend WBC, fever curve, transaminases, creatinine daily  - Extremely poor prognosis  - GOC with family      * THIS IS AN INCOMPLETE NOTE. FINAL RECOMMENDATION IS PENDING *   74F with severe dementia bedbound aox0 presents by ambulance for increased work of breathing, cough, and decreased responsiveness at home.    ID is consulted for sepsis  Hypothermia to 94.5, WBC 11.03  Hypoxemia requiring NRB  Lactate 6.8 > 4.1 > 2.4  BCx 8/14 CoNS, likely procurement contamination  UA WBC 2  COVID +    CXR with Bibasilar opacities  CT A/P wo contrast 8/14  Right basilar pleural effusion with adjacent atelectasis/consolidation.   Apparent occlusion of the right lower lobe bronchus causing atelectasis.   Mild left basilar atelectasis/consolidation. Infection is not excluded.   Consider interval follow-up chest CT with IV contrast for further   evaluation.    Antibiotics:  Cefepime 8/14 ->  Vancomycin 8/14      IMPRESSION:  RLL pneumonia, aspiration?  Severe COVID  Acute hypoxemic respiratory failure  Lactic acidosis  Sacral ulcer, no sign of infection  Intertrigo  Constipation  Bedbound  Dementia  Immunosuppression / Immunosenescence secondary to multiple comorbidities which could result in poor clinical outcome      RECOMMENDATIONS:  - Continue IV cefepime 1g q24hrs (CrCl <10), keep for 7-10 days  - Completed 5 days of RDV, continue IV Decadron 6mg q24hrs x 10 days  - Continue topical clotrimazole  - Bowel regimen  - Offloading and frequent position changes, aspiration precaution  - Trend WBC, fever curve, transaminases, creatinine daily  - Extremely poor prognosis  - GOC with family      Imani Morrow D.O.  Attending Physician  Division of Infectious Diseases  [facility]  Please contact me via [employer] Teams

## 2024-08-20 NOTE — CONSULT NOTE ADULT - SUBJECTIVE AND OBJECTIVE BOX
HPI:  74F with severe dementa bedbound aox0 presents by ambulance for increased work of breathing, cough, and decreased responsiveness at home. Patient lives with son and daughter in law who noticed her symptoms starting in the last 1-2 days. Daughter in law notes that patient appeared to have blue feet this morning, prompting her to call 911. Patient typically eat puree at home (spoon fed) and often coughs or chokes if fed too quickly or improperly positioned.   HCP is Mora Fernando who is currently in [place], phone number provided by daughter in law: [phone].  (14 Aug 2024 14:34)      ROS: A 10-point review of systems was otherwise negative.    Patient with COVID PNA. Called for bradycardia. Patient with dementia and unable to add to interview.  -Found to be silvio to 30s on monitor.     PAST MEDICAL & SURGICAL HISTORY:  Dementia          SOCIAL HISTORY:  FAMILY HISTORY:      ALLERGIES: 	  ciprofloxacin (Unknown)  sulfa drugs (Unknown)  erythromycin (Unknown)  penicillin (Unknown)  penicillins (Unknown)            MEDICATIONS:  acetaminophen  Suppository .. 650 milliGRAM(s) Rectal once  acetylcysteine 20%  Inhalation 4 milliLiter(s) Inhalation every 6 hours  albuterol/ipratropium for Nebulization 3 milliLiter(s) Nebulizer every 6 hours  atropine Injectable 0.5 milliGRAM(s) IntraMuscular once  cefepime   IVPB 1000 milliGRAM(s) IV Intermittent every 24 hours  chlorhexidine 2% Cloths 1 Application(s) Topical <User Schedule>  clotrimazole 1% Cream 1 Application(s) Topical every 12 hours  heparin   Injectable 5000 Unit(s) SubCutaneous every 12 hours  lactated ringers. 1000 milliLiter(s) IV Continuous <Continuous>  norepinephrine Infusion 0.15 MICROgram(s)/kG/Min IV Continuous <Continuous>      PHYSICAL EXAM:  T(C): 35.5 (08-20-24 @ 11:00), Max: 35.9 (08-19-24 @ 23:30)  HR: 42 (08-20-24 @ 11:00) (42 - 77)  BP: 114/58 (08-20-24 @ 11:00) (70/38 - 130/61)  RR: 19 (08-20-24 @ 11:00) (15 - 118)  SpO2: 95% (08-20-24 @ 11:00) (91% - 100%)  Wt(kg): --    GEN: Awake, comfortable. NAD.   HEENT: NCAT, PERRL, EOMI. Mucosa moist. No JVD.   RESP: Decreased BS.   CV: RRR, normal s1/s2. No m/r/g.  ABD: Soft, NTND. BS+  EXT: Warm. No edema, clubbing, or cyanosis.   NEURO: AAOx3. No focal deficits.    I&O's Summary    19 Aug 2024 07:01  -  20 Aug 2024 07:00  --------------------------------------------------------  IN: 1753 mL / OUT: 1820 mL / NET: -67 mL    20 Aug 2024 07:01  -  20 Aug 2024 12:51  --------------------------------------------------------  IN: 212.2 mL / OUT: 190 mL / NET: 22.2 mL        	  LABS:	 	    CARDIAC MARKERS:                                  12.0   17.25 )-----------( 238      ( 19 Aug 2024 08:00 )             36.8     08-19    138  |  107  |  22<H>  ----------------------------<  112<H>  4.0   |  18  |  0.6<L>    Ca    7.3<L>      19 Aug 2024 08:00  Phos  1.7     08-19  Mg     1.6     08-19    TPro  4.4<L>  /  Alb  2.3<L>  /  TBili  0.4  /  DBili  x   /  AST  19  /  ALT  26  /  AlkPhos  108  08-19    proBNP:   Lipid Profile:   HgA1c:   TSH:     TELEMETRY: HR 30-60s. Some PACs causing severe bradycardia.  	    ECG: < from: 12 Lead ECG (08.14.24 @ 09:25) >  Sinus tachycardia  Low voltage QRS  Possible Anterolateral infarct , age undetermined  Abnormal ECG    < end of copied text >   	  RADIOLOGY:   ECHO: < from: TTE Echo Complete w/o Contrast w/ Doppler (08.16.24 @ 07:48) >  Summary:   1. Technically limited study.   2. Left ventricular ejection fraction, by visual estimation, is 55 to   60%.   3. Normal left atrial size.   4. Mild mitral valve regurgitation.   5. Structurally normal mitral valve, with normal leaflet excursion.   6. Normal trileaflet aortic valve with normal opening.   7. Sclerotic aortic valve with normal opening.    < end of copied text >    STRESS:  CATH:

## 2024-08-20 NOTE — PROGRESS NOTE ADULT - SUBJECTIVE AND OBJECTIVE BOX
Patient is a 74y old  Female who presents with a chief complaint of septic shock (19 Aug 2024 14:38)      Over Night Events:  Patient seen and examined.       ROS:  See HPI    PHYSICAL EXAM    ICU Vital Signs Last 24 Hrs  T(C): 35.2 (20 Aug 2024 07:30), Max: 35.9 (19 Aug 2024 23:30)  T(F): 95.4 (20 Aug 2024 07:30), Max: 96.6 (19 Aug 2024 23:30)  HR: 71 (20 Aug 2024 07:30) (47 - 78)  BP: 112/58 (20 Aug 2024 07:30) (87/48 - 134/66)  BP(mean): 77 (20 Aug 2024 07:30) (62 - 92)  ABP: --  ABP(mean): --  RR: 21 (20 Aug 2024 07:30) (15 - 118)  SpO2: 91% (20 Aug 2024 07:30) (90% - 100%)    O2 Parameters below as of 20 Aug 2024 07:30  Patient On (Oxygen Delivery Method): nasal cannula, high flow            General:  HEENT:                Lymph Nodes: NO cervical LN   Lungs: Bilateral BS  Cardiovascular: Regular   Abdomen: Soft, Positive BS  Extremities: No clubbing   Skin: warm   Neurological:   Musculoskeletal: move all ext     I&O's Detail    19 Aug 2024 07:01  -  20 Aug 2024 07:00  --------------------------------------------------------  IN:    dextrose 5%: 700 mL    Enteral Tube Flush: 100 mL    IV PiggyBack: 100 mL    Lactated Ringers: 800 mL    Norepinephrine: 53 mL  Total IN: 1753 mL    OUT:    Indwelling Catheter - Urethral (mL): 1000 mL    Voided (mL): 820 mL  Total OUT: 1820 mL    Total NET: -67 mL      20 Aug 2024 07:01  -  20 Aug 2024 08:02  --------------------------------------------------------  IN:  Total IN: 0 mL    OUT:    Indwelling Catheter - Urethral (mL): 60 mL  Total OUT: 60 mL    Total NET: -60 mL          LABS:                          12.0   17.25 )-----------( 238      ( 19 Aug 2024 08:00 )             36.8         19 Aug 2024 08:00    138    |  107    |  22     ----------------------------<  112    4.0     |  18     |  0.6      Ca    7.3        19 Aug 2024 08:00  Phos  1.7       19 Aug 2024 08:00  Mg     1.6       19 Aug 2024 08:00                                                                                      Urinalysis Basic - ( 19 Aug 2024 08:00 )    Color: x / Appearance: x / SG: x / pH: x  Gluc: 112 mg/dL / Ketone: x  / Bili: x / Urobili: x   Blood: x / Protein: x / Nitrite: x   Leuk Esterase: x / RBC: x / WBC x   Sq Epi: x / Non Sq Epi: x / Bacteria: x                                                                                                                                                 ABG - ( 18 Aug 2024 16:00 )  pH, Arterial: 7.48  pH, Blood: x     /  pCO2: 24    /  pO2: 109   / HCO3: 18    / Base Excess: -4.1  /  SaO2: 99.1                MEDICATIONS  (STANDING):  acetaminophen  Suppository .. 650 milliGRAM(s) Rectal once  acetylcysteine 20%  Inhalation 4 milliLiter(s) Inhalation every 6 hours  albuterol/ipratropium for Nebulization 3 milliLiter(s) Nebulizer every 6 hours  cefepime   IVPB 1000 milliGRAM(s) IV Intermittent every 24 hours  chlorhexidine 2% Cloths 1 Application(s) Topical <User Schedule>  clotrimazole 1% Cream 1 Application(s) Topical every 12 hours  heparin   Injectable 5000 Unit(s) SubCutaneous every 12 hours  lactated ringers. 1000 milliLiter(s) (50 mL/Hr) IV Continuous <Continuous>  midodrine 10 milliGRAM(s) Oral every 8 hours  norepinephrine Infusion 0.15 MICROgram(s)/kG/Min (10.2 mL/Hr) IV Continuous <Continuous>    MEDICATIONS  (PRN):          Xrays:  TLC:  OG:  ET tube:                                                                                       ECHO:  CAM ICU:             Patient is a 74y old  Female who presents with a chief complaint of septic shock (19 Aug 2024 14:38)      Over Night Events:  Patient seen and examined.   hypothermic - started on bear hugger   Justice episodes   Restarted levo       ROS:  See HPI    PHYSICAL EXAM    ICU Vital Signs Last 24 Hrs  T(C): 35.2 (20 Aug 2024 07:30), Max: 35.9 (19 Aug 2024 23:30)  T(F): 95.4 (20 Aug 2024 07:30), Max: 96.6 (19 Aug 2024 23:30)  HR: 71 (20 Aug 2024 07:30) (47 - 78)  BP: 112/58 (20 Aug 2024 07:30) (87/48 - 134/66)  BP(mean): 77 (20 Aug 2024 07:30) (62 - 92)  ABP: --  ABP(mean): --  RR: 21 (20 Aug 2024 07:30) (15 - 118)  SpO2: 91% (20 Aug 2024 07:30) (90% - 100%)    O2 Parameters below as of 20 Aug 2024 07:30  Patient On (Oxygen Delivery Method): nasal cannula, high flow            General:  In no acute distress   HEENT:   MMM     Lymph Nodes: NO cervical LN   Lungs: Bilateral BS, rhonchi   Cardiovascular: Regular S1 S2   Abdomen: Soft, Positive BS  Extremities: No clubbing   Skin: warm   Neurological: at baseline  Musculoskeletal: contracted     I&O's Detail    19 Aug 2024 07:01  -  20 Aug 2024 07:00  --------------------------------------------------------  IN:    dextrose 5%: 700 mL    Enteral Tube Flush: 100 mL    IV PiggyBack: 100 mL    Lactated Ringers: 800 mL    Norepinephrine: 53 mL  Total IN: 1753 mL    OUT:    Indwelling Catheter - Urethral (mL): 1000 mL    Voided (mL): 820 mL  Total OUT: 1820 mL    Total NET: -67 mL      20 Aug 2024 07:01  -  20 Aug 2024 08:02  --------------------------------------------------------  IN:  Total IN: 0 mL    OUT:    Indwelling Catheter - Urethral (mL): 60 mL  Total OUT: 60 mL    Total NET: -60 mL          LABS:                          12.0   17.25 )-----------( 238      ( 19 Aug 2024 08:00 )             36.8         19 Aug 2024 08:00    138    |  107    |  22     ----------------------------<  112    4.0     |  18     |  0.6      Ca    7.3        19 Aug 2024 08:00  Phos  1.7       19 Aug 2024 08:00  Mg     1.6       19 Aug 2024 08:00                                                                                      Urinalysis Basic - ( 19 Aug 2024 08:00 )    Color: x / Appearance: x / SG: x / pH: x  Gluc: 112 mg/dL / Ketone: x  / Bili: x / Urobili: x   Blood: x / Protein: x / Nitrite: x   Leuk Esterase: x / RBC: x / WBC x   Sq Epi: x / Non Sq Epi: x / Bacteria: x                                                                                                                                                 ABG - ( 18 Aug 2024 16:00 )  pH, Arterial: 7.48  pH, Blood: x     /  pCO2: 24    /  pO2: 109   / HCO3: 18    / Base Excess: -4.1  /  SaO2: 99.1                MEDICATIONS  (STANDING):  acetaminophen  Suppository .. 650 milliGRAM(s) Rectal once  acetylcysteine 20%  Inhalation 4 milliLiter(s) Inhalation every 6 hours  albuterol/ipratropium for Nebulization 3 milliLiter(s) Nebulizer every 6 hours  cefepime   IVPB 1000 milliGRAM(s) IV Intermittent every 24 hours  chlorhexidine 2% Cloths 1 Application(s) Topical <User Schedule>  clotrimazole 1% Cream 1 Application(s) Topical every 12 hours  heparin   Injectable 5000 Unit(s) SubCutaneous every 12 hours  lactated ringers. 1000 milliLiter(s) (50 mL/Hr) IV Continuous <Continuous>  midodrine 10 milliGRAM(s) Oral every 8 hours  norepinephrine Infusion 0.15 MICROgram(s)/kG/Min (10.2 mL/Hr) IV Continuous <Continuous>    MEDICATIONS  (PRN):          Xrays:  TLC:  OG:  ET tube:                                                                                       ECHO:  CAM ICU:             Patient is a 74y old  Female who presents with a chief complaint of septic shock (19 Aug 2024 14:38)      Over Night Events:  Patient seen and examined.   hypothermic - started on bear hugger   Justice episodes   Restarted levo       ROS:  See HPI    PHYSICAL EXAM    ICU Vital Signs Last 24 Hrs  T(C): 35.2 (20 Aug 2024 07:30), Max: 35.9 (19 Aug 2024 23:30)  T(F): 95.4 (20 Aug 2024 07:30), Max: 96.6 (19 Aug 2024 23:30)  HR: 71 (20 Aug 2024 07:30) (47 - 78)  BP: 112/58 (20 Aug 2024 07:30) (87/48 - 134/66)  BP(mean): 77 (20 Aug 2024 07:30) (62 - 92)  ABP: --  ABP(mean): --  RR: 21 (20 Aug 2024 07:30) (15 - 118)  SpO2: 91% (20 Aug 2024 07:30) (90% - 100%)    O2 Parameters below as of 20 Aug 2024 07:30  Patient On (Oxygen Delivery Method): nasal cannula, high flow            General: at baseline   In no acute distress   HEENT:   MMM     Lymph Nodes: NO cervical LN   Lungs: Bilateral BS, rhonchi   Cardiovascular: Regular S1 S2   Abdomen: Soft, Positive BS  Extremities: No clubbing   Skin: warm   Neurological: at baseline  Musculoskeletal: contracted     I&O's Detail    19 Aug 2024 07:01  -  20 Aug 2024 07:00  --------------------------------------------------------  IN:    dextrose 5%: 700 mL    Enteral Tube Flush: 100 mL    IV PiggyBack: 100 mL    Lactated Ringers: 800 mL    Norepinephrine: 53 mL  Total IN: 1753 mL    OUT:    Indwelling Catheter - Urethral (mL): 1000 mL    Voided (mL): 820 mL  Total OUT: 1820 mL    Total NET: -67 mL      20 Aug 2024 07:01  -  20 Aug 2024 08:02  --------------------------------------------------------  IN:  Total IN: 0 mL    OUT:    Indwelling Catheter - Urethral (mL): 60 mL  Total OUT: 60 mL    Total NET: -60 mL          LABS:                          12.0   17.25 )-----------( 238      ( 19 Aug 2024 08:00 )             36.8         19 Aug 2024 08:00    138    |  107    |  22     ----------------------------<  112    4.0     |  18     |  0.6      Ca    7.3        19 Aug 2024 08:00  Phos  1.7       19 Aug 2024 08:00  Mg     1.6       19 Aug 2024 08:00                                                                                      Urinalysis Basic - ( 19 Aug 2024 08:00 )    Color: x / Appearance: x / SG: x / pH: x  Gluc: 112 mg/dL / Ketone: x  / Bili: x / Urobili: x   Blood: x / Protein: x / Nitrite: x   Leuk Esterase: x / RBC: x / WBC x   Sq Epi: x / Non Sq Epi: x / Bacteria: x                                                                                                                                                 ABG - ( 18 Aug 2024 16:00 )  pH, Arterial: 7.48  pH, Blood: x     /  pCO2: 24    /  pO2: 109   / HCO3: 18    / Base Excess: -4.1  /  SaO2: 99.1                MEDICATIONS  (STANDING):  acetaminophen  Suppository .. 650 milliGRAM(s) Rectal once  acetylcysteine 20%  Inhalation 4 milliLiter(s) Inhalation every 6 hours  albuterol/ipratropium for Nebulization 3 milliLiter(s) Nebulizer every 6 hours  cefepime   IVPB 1000 milliGRAM(s) IV Intermittent every 24 hours  chlorhexidine 2% Cloths 1 Application(s) Topical <User Schedule>  clotrimazole 1% Cream 1 Application(s) Topical every 12 hours  heparin   Injectable 5000 Unit(s) SubCutaneous every 12 hours  lactated ringers. 1000 milliLiter(s) (50 mL/Hr) IV Continuous <Continuous>  midodrine 10 milliGRAM(s) Oral every 8 hours  norepinephrine Infusion 0.15 MICROgram(s)/kG/Min (10.2 mL/Hr) IV Continuous <Continuous>    MEDICATIONS  (PRN):          Xrays:  TLC:  OG:  ET tube:                                                                                    stable    ECHO:  CAM ICU:

## 2024-08-20 NOTE — PROGRESS NOTE ADULT - SUBJECTIVE AND OBJECTIVE BOX
INFECTIOUS DISEASE FOLLOW UP NOTE:    Interval History/ROS: Patient is a 74y old  Female who presents with a chief complaint of septic shock (20 Aug 2024 08:01)      Overnight events:    REVIEW OF SYSTEMS:        Prior hospital charts reviewed [Yes]  Primary team notes reviewed [Yes]  Other consultant notes reviewed [Yes]    Allergies:  ciprofloxacin (Unknown)  sulfa drugs (Unknown)  erythromycin (Unknown)  penicillin (Unknown)  penicillins (Unknown)      ANTIMICROBIALS:   cefepime   IVPB 1000 every 24 hours      OTHER MEDS: MEDICATIONS  (STANDING):  acetaminophen  Suppository .. 650 once  acetylcysteine 20%  Inhalation 4 every 6 hours  albuterol/ipratropium for Nebulization 3 every 6 hours  heparin   Injectable 5000 every 12 hours  midodrine 10 every 8 hours  norepinephrine Infusion 0.15 <Continuous>      Vital Signs Last 24 Hrs  T(F): 95.4 (08-20-24 @ 07:30), Max: 100.9 (08-15-24 @ 05:05)    Vital Signs Last 24 Hrs  HR: 71 (08-20-24 @ 07:30) (47 - 77)  BP: 112/58 (08-20-24 @ 07:30) (87/48 - 134/66)  RR: 21 (08-20-24 @ 07:30)  SpO2: 92% (08-20-24 @ 08:32) (91% - 100%)  Wt(kg): --    EXAM:      Labs:                        12.0   17.25 )-----------( 238      ( 19 Aug 2024 08:00 )             36.8     08-19    138  |  107  |  22<H>  ----------------------------<  112<H>  4.0   |  18  |  0.6<L>    Ca    7.3<L>      19 Aug 2024 08:00  Phos  1.7     08-19  Mg     1.6     08-19    TPro  4.4<L>  /  Alb  2.3<L>  /  TBili  0.4  /  DBili  x   /  AST  19  /  ALT  26  /  AlkPhos  108  08-19      WBC Trend:  WBC Count: 17.25 (08-19-24 @ 08:00)  WBC Count: 15.27 (08-18-24 @ 06:23)  WBC Count: 14.18 (08-17-24 @ 06:56)  WBC Count: 15.83 (08-16-24 @ 06:30)      Creatine Trend:  Creatinine: 0.6 (08-19)  Creatinine: 0.8 (08-18)  Creatinine: 1.0 (08-17)  Creatinine: 1.3 (08-16)      Liver Biochemical Testing Trend:  Alanine Aminotransferase (ALT/SGPT): 26 (08-19)  Alanine Aminotransferase (ALT/SGPT): 27 (08-18)  Alanine Aminotransferase (ALT/SGPT): 30 (08-17)  Alanine Aminotransferase (ALT/SGPT): 32 (08-16)  Alanine Aminotransferase (ALT/SGPT): 32 (08-15)  Aspartate Aminotransferase (AST/SGOT): 19 (08-19-24 @ 08:00)  Aspartate Aminotransferase (AST/SGOT): 19 (08-18-24 @ 06:23)  Aspartate Aminotransferase (AST/SGOT): 20 (08-17-24 @ 06:56)  Aspartate Aminotransferase (AST/SGOT): 24 (08-16-24 @ 06:30)  Aspartate Aminotransferase (AST/SGOT): 25 (08-15-24 @ 21:50)  Bilirubin Total: 0.4 (08-19)  Bilirubin Total: 0.4 (08-18)  Bilirubin Total: 0.4 (08-17)  Bilirubin Total: 0.5 (08-16)  Bilirubin Total: 0.5 (08-15)      Trend LDH      Urinalysis Basic - ( 19 Aug 2024 08:00 )    Color: x / Appearance: x / SG: x / pH: x  Gluc: 112 mg/dL / Ketone: x  / Bili: x / Urobili: x   Blood: x / Protein: x / Nitrite: x   Leuk Esterase: x / RBC: x / WBC x   Sq Epi: x / Non Sq Epi: x / Bacteria: x        MICROBIOLOGY:  Vancomycin Level, Random: 33.8 (08-16 @ 06:30)  Vancomycin Level, Random: 12.1 (08-15 @ 10:53)  Vancomycin Level, Trough: 15.9 (08-15 @ 07:00)    MRSA PCR Result.: Negative (08-14-24 @ 19:10)      Urinalysis with Rflx Culture (collected 14 Aug 2024 08:40)    Culture - Blood (collected 14 Aug 2024 08:30)  Source: .Blood Blood-Peripheral  Final Report:    Growth in aerobic bottle: Staphylococcus epidermidis    "Susceptibilities not performed"    Direct identification is available within approximately 3-5    hours either by Blood Panel Multiplexed PCR or Direct    MALDI-TOF. Details: [email or web address]  Organism: Blood Culture PCR  Organism: Blood Culture PCR    Sensitivities:      -  Staphylococcus epidermidis, Methicillin resistant: Detec      Method Type: PCR    Culture - Blood (collected 14 Aug 2024 08:30)  Source: .Blood Blood-Peripheral  Final Report:    Growth in anaerobic bottle: Staphylococcus epidermidis  Organism: Staphylococcus epidermidis  Organism: Staphylococcus epidermidis    Sensitivities:      -  Clindamycin: S <=0.25      -  Oxacillin: S <=0.25      -  Gentamicin: S <=1 Should not be used as monotherapy      -  Vancomycin: S 1      -  Tetracycline: S <=1      Method Type: RAPHAEL      -  Penicillin: R 1      -  Rifampin: S <=1 Should not be used as monotherapy      -  Erythromycin: R >4      -  Trimethoprim/Sulfamethoxazole: R >2/38      Troponin T, High Sensitivity Result: 140 (08-14)  Troponin T, High Sensitivity Result: 142 (08-14)  Troponin T, High Sensitivity Result: 214 (08-14)    Blood Gas Arterial, Lactate: 2.4 (08-18 @ 16:00)      RADIOLOGY:  imaging below personally reviewed   INFECTIOUS DISEASE FOLLOW UP NOTE:    Interval History/ROS: Patient is a 74y old  Female who presents with a chief complaint of septic shock (20 Aug 2024 08:01)    Overnight events: No overnight event. On HFNC. Opens eyes briefly.    REVIEW OF SYSTEMS:  Unable to provide due to cognitive impairment      Prior hospital charts reviewed [Yes]  Primary team notes reviewed [Yes]  Other consultant notes reviewed [Yes]    Allergies:  ciprofloxacin (Unknown)  sulfa drugs (Unknown)  erythromycin (Unknown)  penicillin (Unknown)  penicillins (Unknown)      ANTIMICROBIALS:   cefepime   IVPB 1000 every 24 hours      OTHER MEDS: MEDICATIONS  (STANDING):  acetaminophen  Suppository .. 650 once  acetylcysteine 20%  Inhalation 4 every 6 hours  albuterol/ipratropium for Nebulization 3 every 6 hours  heparin   Injectable 5000 every 12 hours  midodrine 10 every 8 hours  norepinephrine Infusion 0.15 <Continuous>      Vital Signs Last 24 Hrs  T(F): 95.4 (08-20-24 @ 07:30), Max: 100.9 (08-15-24 @ 05:05)    Vital Signs Last 24 Hrs  HR: 71 (08-20-24 @ 07:30) (47 - 77)  BP: 112/58 (08-20-24 @ 07:30) (87/48 - 134/66)  RR: 21 (08-20-24 @ 07:30)  SpO2: 92% (08-20-24 @ 08:32) (91% - 100%)  Wt(kg): --    EXAM:  GENERAL: NAD, lying in bed. very frail and weak  HEAD: No head lesions  EYES: Conjunctiva pink and cornea white  EAR, NOSE, MOUTH, THROAT: Normal external ears and nose, no discharges; dry mucous membranes; + HFNC  NECK: Supple, no JVD  RESPIRATORY: Diffuse rhonchi  CARDIOVASCULAR: S1 S2, tachycardia  GASTROINTESTINAL: Soft, nontender, nondistended; normoactive bowel sounds  GENITOURINARY: + trujillo catheter  EXTREMITIES: Severely contracted extremities  NERVOUS SYSTEM: Not responsive, not oriented  MUSCULOSKELETAL: No joint erythema, swelling  SKIN: Sacral ulcer stage 2, no purulence. no superficial thrombophlebitis  PSYCH: Lethargic    Labs:                        12.0   17.25 )-----------( 238      ( 19 Aug 2024 08:00 )             36.8     08-19    138  |  107  |  22<H>  ----------------------------<  112<H>  4.0   |  18  |  0.6<L>    Ca    7.3<L>      19 Aug 2024 08:00  Phos  1.7     08-19  Mg     1.6     08-19    TPro  4.4<L>  /  Alb  2.3<L>  /  TBili  0.4  /  DBili  x   /  AST  19  /  ALT  26  /  AlkPhos  108  08-19      WBC Trend:  WBC Count: 17.25 (08-19-24 @ 08:00)  WBC Count: 15.27 (08-18-24 @ 06:23)  WBC Count: 14.18 (08-17-24 @ 06:56)  WBC Count: 15.83 (08-16-24 @ 06:30)      Creatine Trend:  Creatinine: 0.6 (08-19)  Creatinine: 0.8 (08-18)  Creatinine: 1.0 (08-17)  Creatinine: 1.3 (08-16)      Liver Biochemical Testing Trend:  Alanine Aminotransferase (ALT/SGPT): 26 (08-19)  Alanine Aminotransferase (ALT/SGPT): 27 (08-18)  Alanine Aminotransferase (ALT/SGPT): 30 (08-17)  Alanine Aminotransferase (ALT/SGPT): 32 (08-16)  Alanine Aminotransferase (ALT/SGPT): 32 (08-15)  Aspartate Aminotransferase (AST/SGOT): 19 (08-19-24 @ 08:00)  Aspartate Aminotransferase (AST/SGOT): 19 (08-18-24 @ 06:23)  Aspartate Aminotransferase (AST/SGOT): 20 (08-17-24 @ 06:56)  Aspartate Aminotransferase (AST/SGOT): 24 (08-16-24 @ 06:30)  Aspartate Aminotransferase (AST/SGOT): 25 (08-15-24 @ 21:50)  Bilirubin Total: 0.4 (08-19)  Bilirubin Total: 0.4 (08-18)  Bilirubin Total: 0.4 (08-17)  Bilirubin Total: 0.5 (08-16)  Bilirubin Total: 0.5 (08-15)      Trend LDH      Urinalysis Basic - ( 19 Aug 2024 08:00 )    Color: x / Appearance: x / SG: x / pH: x  Gluc: 112 mg/dL / Ketone: x  / Bili: x / Urobili: x   Blood: x / Protein: x / Nitrite: x   Leuk Esterase: x / RBC: x / WBC x   Sq Epi: x / Non Sq Epi: x / Bacteria: x        MICROBIOLOGY:  Vancomycin Level, Random: 33.8 (08-16 @ 06:30)  Vancomycin Level, Random: 12.1 (08-15 @ 10:53)  Vancomycin Level, Trough: 15.9 (08-15 @ 07:00)    MRSA PCR Result.: Negative (08-14-24 @ 19:10)      Urinalysis with Rflx Culture (collected 14 Aug 2024 08:40)    Culture - Blood (collected 14 Aug 2024 08:30)  Source: .Blood Blood-Peripheral  Final Report:    Growth in aerobic bottle: Staphylococcus epidermidis    "Susceptibilities not performed"    Direct identification is available within approximately 3-5    hours either by Blood Panel Multiplexed PCR or Direct    MALDI-TOF. Details: [email or web address]  Organism: Blood Culture PCR  Organism: Blood Culture PCR    Sensitivities:      -  Staphylococcus epidermidis, Methicillin resistant: Detec      Method Type: PCR    Culture - Blood (collected 14 Aug 2024 08:30)  Source: .Blood Blood-Peripheral  Final Report:    Growth in anaerobic bottle: Staphylococcus epidermidis  Organism: Staphylococcus epidermidis  Organism: Staphylococcus epidermidis    Sensitivities:      -  Clindamycin: S <=0.25      -  Oxacillin: S <=0.25      -  Gentamicin: S <=1 Should not be used as monotherapy      -  Vancomycin: S 1      -  Tetracycline: S <=1      Method Type: RAPHAEL      -  Penicillin: R 1      -  Rifampin: S <=1 Should not be used as monotherapy      -  Erythromycin: R >4      -  Trimethoprim/Sulfamethoxazole: R >2/38      Troponin T, High Sensitivity Result: 140 (08-14)  Troponin T, High Sensitivity Result: 142 (08-14)  Troponin T, High Sensitivity Result: 214 (08-14)    Blood Gas Arterial, Lactate: 2.4 (08-18 @ 16:00)      RADIOLOGY:  imaging below personally reviewed    < from: Xray Chest 1 View- PORTABLE-Urgent (Xray Chest 1 View- PORTABLE-Urgent .) (08.19.24 @ 21:20) >  Impression:    Enteric tube terminates below the left hemidiaphragm.    Essentially stable bilateral opacities.    < end of copied text >

## 2024-08-20 NOTE — CONSULT NOTE ADULT - CONSULT REQUESTED DATE/TIME
15-Aug-2024 10:35
15-Aug-2024 13:29
14-Aug-2024 15:52
14-Aug-2024 13:46
20-Aug-2024 12:44
15-Aug-2024 20:12

## 2024-08-20 NOTE — CONSULT NOTE ADULT - ASSESSMENT
74F with severe dementa bedbound aox0 presents by ambulance for increased work of breathing, cough, and decreased responsiveness at home.    Bradycardia: Likely multifactorial with hypothermia and hypoxia at time.   -SpO2 85% at time with rectal temp 95.   -When woken up, SpO2 up to 95+% and HR 60s.   -Can continue low dose levophed for BP and HR.   -Continue with bear-hugger and continue to optimize respiratory status.   -If bradycardia worsens, can consider changing levophed to dopamine.   -No indication for TVP/PPM at this time.   -Check EKG.   -Continue tele monitoring.

## 2024-08-21 NOTE — PROGRESS NOTE ADULT - ASSESSMENT
74F with severe dementia bedbound aox0 presents by ambulance for increased work of breathing, cough, and decreased responsiveness at home.    ID is consulted for sepsis  Hypothermia to 94.5, WBC 11.03  Hypoxemia requiring NRB  Lactate 6.8 > 4.1 > 2.4  BCx 8/14 CoNS, likely procurement contamination  UA WBC 2  COVID +    CXR with Bibasilar opacities  CT A/P wo contrast 8/14  Right basilar pleural effusion with adjacent atelectasis/consolidation.   Apparent occlusion of the right lower lobe bronchus causing atelectasis.   Mild left basilar atelectasis/consolidation. Infection is not excluded.   Consider interval follow-up chest CT with IV contrast for further   evaluation.    Antibiotics:  Cefepime 8/14 ->  Vancomycin 8/14      IMPRESSION:  RLL pneumonia, aspiration?  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  Attending Physician  Division of Infectious Diseases  [facility]  Please contact me via [employer] Teams

## 2024-08-21 NOTE — PROGRESS NOTE ADULT - SUBJECTIVE AND OBJECTIVE BOX
INFECTIOUS DISEASE FOLLOW UP NOTE:    Interval History/ROS: Patient is a 74y old  Female who presents with a chief complaint of septic shock (21 Aug 2024 09:05)      Overnight events:    REVIEW OF SYSTEMS:        Prior hospital charts reviewed [Yes]  Primary team notes reviewed [Yes]  Other consultant notes reviewed [Yes]    Allergies:  ciprofloxacin (Unknown)  sulfa drugs (Unknown)  erythromycin (Unknown)  penicillin (Unknown)  penicillins (Unknown)      ANTIMICROBIALS:   cefepime   IVPB 1000 every 24 hours      OTHER MEDS: MEDICATIONS  (STANDING):  acetylcysteine 20%  Inhalation 4 every 6 hours  albuterol/ipratropium for Nebulization 3 every 6 hours  atropine Injectable 0.5 once  dexAMETHasone  Injectable 6 daily  heparin   Injectable 5000 every 12 hours  midodrine 5 every 8 hours  norepinephrine Infusion 0.15 <Continuous>      Vital Signs Last 24 Hrs  T(F): 97.5 (08-21-24 @ 07:01), Max: 100.9 (08-15-24 @ 05:05)    Vital Signs Last 24 Hrs  HR: 54 (08-21-24 @ 08:05) (42 - 86)  BP: 126/58 (08-21-24 @ 08:05) (86/48 - 126/58)  RR: 20 (08-21-24 @ 08:05)  SpO2: 98% (08-21-24 @ 09:12) (86% - 100%)  Wt(kg): --    EXAM:      Labs:                        12.8   13.83 )-----------( 269      ( 21 Aug 2024 06:32 )             39.2     08-21    144  |  112<H>  |  18  ----------------------------<  137<H>  4.3   |  20  |  0.6<L>    Ca    7.6<L>      21 Aug 2024 06:32  Phos  2.5     08-21  Mg     2.1     08-21    TPro  4.7<L>  /  Alb  2.4<L>  /  TBili  0.4  /  DBili  x   /  AST  28  /  ALT  27  /  AlkPhos  107  08-21      WBC Trend:  WBC Count: 13.83 (08-21-24 @ 06:32)  WBC Count: 15.40 (08-20-24 @ 11:00)  WBC Count: 15.22 (08-20-24 @ 04:30)  WBC Count: 17.25 (08-19-24 @ 08:00)      Creatine Trend:  Creatinine: 0.6 (08-21)  Creatinine: 0.6 (08-20)  Creatinine: 0.5 (08-20)  Creatinine: 0.6 (08-19)      Liver Biochemical Testing Trend:  Alanine Aminotransferase (ALT/SGPT): 27 (08-21)  Alanine Aminotransferase (ALT/SGPT): 27 (08-20)  Alanine Aminotransferase (ALT/SGPT): 26 (08-20)  Alanine Aminotransferase (ALT/SGPT): 26 (08-19)  Alanine Aminotransferase (ALT/SGPT): 27 (08-18)  Aspartate Aminotransferase (AST/SGOT): 28 (08-21-24 @ 06:32)  Aspartate Aminotransferase (AST/SGOT): 22 (08-20-24 @ 11:00)  Aspartate Aminotransferase (AST/SGOT): 19 (08-20-24 @ 04:30)  Aspartate Aminotransferase (AST/SGOT): 19 (08-19-24 @ 08:00)  Aspartate Aminotransferase (AST/SGOT): 19 (08-18-24 @ 06:23)  Bilirubin Total: 0.4 (08-21)  Bilirubin Total: 0.4 (08-20)  Bilirubin Total: 0.4 (08-20)  Bilirubin Total: 0.4 (08-19)  Bilirubin Total: 0.4 (08-18)      Trend LDH      Urinalysis Basic - ( 21 Aug 2024 06:32 )    Color: x / Appearance: x / SG: x / pH: x  Gluc: 137 mg/dL / Ketone: x  / Bili: x / Urobili: x   Blood: x / Protein: x / Nitrite: x   Leuk Esterase: x / RBC: x / WBC x   Sq Epi: x / Non Sq Epi: x / Bacteria: x        MICROBIOLOGY:  Vancomycin Level, Random: 33.8 (08-16 @ 06:30)  Vancomycin Level, Random: 12.1 (08-15 @ 10:53)  Vancomycin Level, Trough: 15.9 (08-15 @ 07:00)    MRSA PCR Result.: Negative (08-14-24 @ 19:10)      Urinalysis with Rflx Culture (collected 14 Aug 2024 08:40)    Culture - Blood (collected 14 Aug 2024 08:30)  Source: .Blood Blood-Peripheral  Final Report:    Growth in aerobic bottle: Staphylococcus epidermidis    "Susceptibilities not performed"    Direct identification is available within approximately 3-5    hours either by Blood Panel Multiplexed PCR or Direct    MALDI-TOF. Details: [email or web address]  Organism: Blood Culture PCR  Organism: Blood Culture PCR    Sensitivities:      -  Staphylococcus epidermidis, Methicillin resistant: Detec      Method Type: PCR    Culture - Blood (collected 14 Aug 2024 08:30)  Source: .Blood Blood-Peripheral  Final Report:    Growth in anaerobic bottle: Staphylococcus epidermidis  Organism: Staphylococcus epidermidis  Organism: Staphylococcus epidermidis    Sensitivities:      -  Clindamycin: S <=0.25      -  Oxacillin: S <=0.25      -  Gentamicin: S <=1 Should not be used as monotherapy      -  Vancomycin: S 1      -  Tetracycline: S <=1      Method Type: RAPHAEL      -  Penicillin: R 1      -  Rifampin: S <=1 Should not be used as monotherapy      -  Erythromycin: R >4      -  Trimethoprim/Sulfamethoxazole: R >2/38      Troponin T, High Sensitivity Result: 140 (08-14)  Troponin T, High Sensitivity Result: 142 (08-14)    Blood Gas Arterial, Lactate: 1.2 (08-20 @ 10:54)  Blood Gas Arterial, Lactate: 2.4 (08-18 @ 16:00)      RADIOLOGY:  imaging below personally reviewed   INFECTIOUS DISEASE FOLLOW UP NOTE:    Interval History/ROS: Patient is a 74y old  Female who presents with a chief complaint of septic shock (21 Aug 2024 09:05)      Overnight events: No overnight event. on HFNC    REVIEW OF SYSTEMS:  Unable to provide due to cognitive impairment      Prior hospital charts reviewed [Yes]  Primary team notes reviewed [Yes]  Other consultant notes reviewed [Yes]    Allergies:  ciprofloxacin (Unknown)  sulfa drugs (Unknown)  erythromycin (Unknown)  penicillin (Unknown)  penicillins (Unknown)      ANTIMICROBIALS:   cefepime   IVPB 1000 every 24 hours      OTHER MEDS: MEDICATIONS  (STANDING):  acetylcysteine 20%  Inhalation 4 every 6 hours  albuterol/ipratropium for Nebulization 3 every 6 hours  atropine Injectable 0.5 once  dexAMETHasone  Injectable 6 daily  heparin   Injectable 5000 every 12 hours  midodrine 5 every 8 hours  norepinephrine Infusion 0.15 <Continuous>      Vital Signs Last 24 Hrs  T(F): 97.5 (08-21-24 @ 07:01), Max: 100.9 (08-15-24 @ 05:05)    Vital Signs Last 24 Hrs  HR: 54 (08-21-24 @ 08:05) (42 - 86)  BP: 126/58 (08-21-24 @ 08:05) (86/48 - 126/58)  RR: 20 (08-21-24 @ 08:05)  SpO2: 98% (08-21-24 @ 09:12) (86% - 100%)  Wt(kg): --    EXAM:  GENERAL: NAD, lying in bed. very frail and weak  HEAD: No head lesions  EYES: Conjunctiva pink and cornea white  EAR, NOSE, MOUTH, THROAT: Normal external ears and nose, no discharges; dry mucous membranes; + HFNC  NECK: Supple, no JVD  RESPIRATORY: Diffuse rhonchi  CARDIOVASCULAR: S1 S2, tachycardia  GASTROINTESTINAL: Soft, nontender, nondistended; normoactive bowel sounds  GENITOURINARY: + trujillo catheter  EXTREMITIES: Severely contracted extremities  NERVOUS SYSTEM: Not responsive, not oriented  MUSCULOSKELETAL: No joint erythema, swelling  SKIN: Sacral ulcer stage 2, no purulence. no superficial thrombophlebitis  PSYCH: Lethargic      Labs:                        12.8   13.83 )-----------( 269      ( 21 Aug 2024 06:32 )             39.2     08-21    144  |  112<H>  |  18  ----------------------------<  137<H>  4.3   |  20  |  0.6<L>    Ca    7.6<L>      21 Aug 2024 06:32  Phos  2.5     08-21  Mg     2.1     08-21    TPro  4.7<L>  /  Alb  2.4<L>  /  TBili  0.4  /  DBili  x   /  AST  28  /  ALT  27  /  AlkPhos  107  08-21      WBC Trend:  WBC Count: 13.83 (08-21-24 @ 06:32)  WBC Count: 15.40 (08-20-24 @ 11:00)  WBC Count: 15.22 (08-20-24 @ 04:30)  WBC Count: 17.25 (08-19-24 @ 08:00)      Creatine Trend:  Creatinine: 0.6 (08-21)  Creatinine: 0.6 (08-20)  Creatinine: 0.5 (08-20)  Creatinine: 0.6 (08-19)      Liver Biochemical Testing Trend:  Alanine Aminotransferase (ALT/SGPT): 27 (08-21)  Alanine Aminotransferase (ALT/SGPT): 27 (08-20)  Alanine Aminotransferase (ALT/SGPT): 26 (08-20)  Alanine Aminotransferase (ALT/SGPT): 26 (08-19)  Alanine Aminotransferase (ALT/SGPT): 27 (08-18)  Aspartate Aminotransferase (AST/SGOT): 28 (08-21-24 @ 06:32)  Aspartate Aminotransferase (AST/SGOT): 22 (08-20-24 @ 11:00)  Aspartate Aminotransferase (AST/SGOT): 19 (08-20-24 @ 04:30)  Aspartate Aminotransferase (AST/SGOT): 19 (08-19-24 @ 08:00)  Aspartate Aminotransferase (AST/SGOT): 19 (08-18-24 @ 06:23)  Bilirubin Total: 0.4 (08-21)  Bilirubin Total: 0.4 (08-20)  Bilirubin Total: 0.4 (08-20)  Bilirubin Total: 0.4 (08-19)  Bilirubin Total: 0.4 (08-18)      Trend LDH      Urinalysis Basic - ( 21 Aug 2024 06:32 )    Color: x / Appearance: x / SG: x / pH: x  Gluc: 137 mg/dL / Ketone: x  / Bili: x / Urobili: x   Blood: x / Protein: x / Nitrite: x   Leuk Esterase: x / RBC: x / WBC x   Sq Epi: x / Non Sq Epi: x / Bacteria: x        MICROBIOLOGY:  Vancomycin Level, Random: 33.8 (08-16 @ 06:30)  Vancomycin Level, Random: 12.1 (08-15 @ 10:53)  Vancomycin Level, Trough: 15.9 (08-15 @ 07:00)    MRSA PCR Result.: Negative (08-14-24 @ 19:10)      Urinalysis with Rflx Culture (collected 14 Aug 2024 08:40)    Culture - Blood (collected 14 Aug 2024 08:30)  Source: .Blood Blood-Peripheral  Final Report:    Growth in aerobic bottle: Staphylococcus epidermidis    "Susceptibilities not performed"    Direct identification is available within approximately 3-5    hours either by Blood Panel Multiplexed PCR or Direct    MALDI-TOF. Details: [email or web address]  Organism: Blood Culture PCR  Organism: Blood Culture PCR    Sensitivities:      -  Staphylococcus epidermidis, Methicillin resistant: Detec      Method Type: PCR    Culture - Blood (collected 14 Aug 2024 08:30)  Source: .Blood Blood-Peripheral  Final Report:    Growth in anaerobic bottle: Staphylococcus epidermidis  Organism: Staphylococcus epidermidis  Organism: Staphylococcus epidermidis    Sensitivities:      -  Clindamycin: S <=0.25      -  Oxacillin: S <=0.25      -  Gentamicin: S <=1 Should not be used as monotherapy      -  Vancomycin: S 1      -  Tetracycline: S <=1      Method Type: RAPHAEL      -  Penicillin: R 1      -  Rifampin: S <=1 Should not be used as monotherapy      -  Erythromycin: R >4      -  Trimethoprim/Sulfamethoxazole: R >2/38      Troponin T, High Sensitivity Result: 140 (08-14)  Troponin T, High Sensitivity Result: 142 (08-14)    Blood Gas Arterial, Lactate: 1.2 (08-20 @ 10:54)  Blood Gas Arterial, Lactate: 2.4 (08-18 @ 16:00)      RADIOLOGY:  imaging below personally reviewed    < from: Xray Chest 1 View-PORTABLE IMMEDIATE (Xray Chest 1 View-PORTABLE IMMEDIATE .) (08.21.24 @ 07:40) >  Impression:    Lung opacities likely unchanged.    NG tube as described.    < end of copied text >

## 2024-08-21 NOTE — PROGRESS NOTE ADULT - SUBJECTIVE AND OBJECTIVE BOX
Patient seen and evaluated this am, remains on HF oxygen and pressors       T(F): 97.7 (08-21-24 @ 15:00), Max: 98.1 (08-20-24 @ 19:01)  HR: 63 (08-21-24 @ 18:00)  BP: 105/53 (08-21-24 @ 18:00)  RR: 20 (08-21-24 @ 18:00)  SpO2: 96% (08-21-24 @ 18:00) (86% - 100%)    PHYSICAL EXAM:  GENERAL: NAD  HEAD:  Atraumatic, Normocephalic  EYES: EOMI, PERRLA, conjunctiva and sclera clear  NERVOUS SYSTEM:  no focal deficits   CHEST/LUNG:  bilateral rales  HEART: Regular rate and rhythm; No murmurs, rubs, or gallops  ABDOMEN: Soft, Nontender, Nondistended; Bowel sounds present  EXTREMITIES:  2+ Peripheral Pulses, No clubbing, cyanosis, or edema    LABS  08-21    144  |  112<H>  |  18  ----------------------------<  137<H>  4.3   |  20  |  0.6<L>    Ca    7.6<L>      21 Aug 2024 06:32  Phos  2.5     08-21  Mg     2.1     08-21    TPro  4.7<L>  /  Alb  2.4<L>  /  TBili  0.4  /  DBili  x   /  AST  28  /  ALT  27  /  AlkPhos  107  08-21                          12.8   13.83 )-----------( 269      ( 21 Aug 2024 06:32 )             39.2       Culture Results:   Growth in aerobic bottle: Staphylococcus epidermidis  "Susceptibilities not performed"  Direct identification is available within approximately 3-5  hours either by Blood Panel Multiplexed PCR or Direct  MALDI-TOF. Details: [email or web address] (08-14-24)  Culture Results:   Growth in anaerobic bottle: Staphylococcus epidermidis (08-14-24)    RADIOLOGY  < from: Xray Chest 1 View-PORTABLE IMMEDIATE (Xray Chest 1 View-PORTABLE IMMEDIATE .) (08.21.24 @ 07:40) >    Impression:    Lung opacities likely unchanged.    < end of copied text >    MEDICATIONS  (STANDING):  acetylcysteine 20%  Inhalation 4 milliLiter(s) Inhalation every 6 hours  albuterol/ipratropium for Nebulization 3 milliLiter(s) Nebulizer every 6 hours  atropine Injectable 0.5 milliGRAM(s) IntraMuscular once  cefepime   IVPB 1000 milliGRAM(s) IV Intermittent every 24 hours  chlorhexidine 2% Cloths 1 Application(s) Topical <User Schedule>  clotrimazole 1% Cream 1 Application(s) Topical every 12 hours  dexAMETHasone  Injectable 6 milliGRAM(s) IV Push daily  heparin   Injectable 5000 Unit(s) SubCutaneous every 12 hours  midodrine 5 milliGRAM(s) Oral every 8 hours  norepinephrine Infusion 0.15 MICROgram(s)/kG/Min (10.2 mL/Hr) IV Continuous <Continuous>  polyethylene glycol 3350 17 Gram(s) Oral two times a day  senna 2 Tablet(s) Oral at bedtime    MEDICATIONS  (PRN):

## 2024-08-21 NOTE — PROGRESS NOTE ADULT - SUBJECTIVE AND OBJECTIVE BOX
Patient is a 74y old  Female who presents with a chief complaint of septic shock (20 Aug 2024 13:53)      Over Night Events:  Patient seen and examined.       ROS:  See HPI    PHYSICAL EXAM    ICU Vital Signs Last 24 Hrs  T(C): 36.4 (21 Aug 2024 07:01), Max: 36.8 (20 Aug 2024 17:01)  T(F): 97.5 (21 Aug 2024 07:01), Max: 98.2 (20 Aug 2024 17:01)  HR: 54 (21 Aug 2024 08:05) (42 - 86)  BP: 126/58 (21 Aug 2024 08:05) (86/48 - 126/58)  BP(mean): 84 (21 Aug 2024 08:05) (62 - 86)  ABP: --  ABP(mean): --  RR: 20 (21 Aug 2024 08:05) (18 - 27)  SpO2: 100% (21 Aug 2024 08:05) (86% - 100%)    O2 Parameters below as of 21 Aug 2024 08:05  Patient On (Oxygen Delivery Method): nasal cannula w/ humidification  O2 Flow (L/min): 45          General:  HEENT:                Lymph Nodes: NO cervical LN   Lungs: Bilateral BS  Cardiovascular: Regular   Abdomen: Soft, Positive BS  Extremities: No clubbing   Skin: warm   Neurological:   Musculoskeletal: move all ext     I&O's Detail    20 Aug 2024 07:01  -  21 Aug 2024 07:00  --------------------------------------------------------  IN:    Enteral Tube Flush: 100 mL    Jevity 1.2: 130 mL    Lactated Ringers: 700 mL    Norepinephrine: 150 mL  Total IN: 1080 mL    OUT:    Indwelling Catheter - Urethral (mL): 780 mL  Total OUT: 780 mL    Total NET: 300 mL      21 Aug 2024 07:01  -  21 Aug 2024 09:09  --------------------------------------------------------  IN:  Total IN: 0 mL    OUT:    Indwelling Catheter - Urethral (mL): 25 mL  Total OUT: 25 mL    Total NET: -25 mL          LABS:                          12.8   13.83 )-----------( 269      ( 21 Aug 2024 06:32 )             39.2         21 Aug 2024 06:32    144    |  112    |  18     ----------------------------<  137    4.3     |  20     |  0.6      Ca    7.6        21 Aug 2024 06:32  Phos  2.5       21 Aug 2024 06:32  Mg     2.1       21 Aug 2024 06:32    TPro  4.7    /  Alb  2.4    /  TBili  0.4    /  DBili  x      /  AST  28     /  ALT  27     /  AlkPhos  107    21 Aug 2024 06:32  Amylase x     lipase x                                                                                        Urinalysis Basic - ( 21 Aug 2024 06:32 )    Color: x / Appearance: x / SG: x / pH: x  Gluc: 137 mg/dL / Ketone: x  / Bili: x / Urobili: x   Blood: x / Protein: x / Nitrite: x   Leuk Esterase: x / RBC: x / WBC x   Sq Epi: x / Non Sq Epi: x / Bacteria: x                                                                                                                                                 ABG - ( 20 Aug 2024 10:54 )  pH, Arterial: 7.49  pH, Blood: x     /  pCO2: 29    /  pO2: 77    / HCO3: 22    / Base Excess: -0.4  /  SaO2: 97.2                MEDICATIONS  (STANDING):  acetylcysteine 20%  Inhalation 4 milliLiter(s) Inhalation every 6 hours  albuterol/ipratropium for Nebulization 3 milliLiter(s) Nebulizer every 6 hours  atropine Injectable 0.5 milliGRAM(s) IntraMuscular once  cefepime   IVPB 1000 milliGRAM(s) IV Intermittent every 24 hours  chlorhexidine 2% Cloths 1 Application(s) Topical <User Schedule>  clotrimazole 1% Cream 1 Application(s) Topical every 12 hours  dexAMETHasone  Injectable 6 milliGRAM(s) IV Push daily  heparin   Injectable 5000 Unit(s) SubCutaneous every 12 hours  norepinephrine Infusion 0.15 MICROgram(s)/kG/Min (10.2 mL/Hr) IV Continuous <Continuous>    MEDICATIONS  (PRN):          Xrays:  TLC:  OG:  ET tube:                                                                                       ECHO:  CAM ICU:    IMPRESSION:      PLAN:    CNS:    HEENT:    PULMONARY:    CARDIOVASCULAR:    GI: GI prophylaxis.  Feeding     RENAL:    INFECTIOUS DISEASE:    HEMATOLOGICAL:  DVT prophylaxis.    ENDOCRINE:  Follow up FS.  Insulin protocol if needed.    MUSCULOSKELETAL:         Patient is a 74y old  Female who presents with a chief complaint of septic shock (20 Aug 2024 13:53)      Over Night Events:  Patient seen and examined.   NG tube came out and was replaced   X ray reviewed       ROS:  See HPI    PHYSICAL EXAM    ICU Vital Signs Last 24 Hrs  T(C): 36.4 (21 Aug 2024 07:01), Max: 36.8 (20 Aug 2024 17:01)  T(F): 97.5 (21 Aug 2024 07:01), Max: 98.2 (20 Aug 2024 17:01)  HR: 54 (21 Aug 2024 08:05) (42 - 86)  BP: 126/58 (21 Aug 2024 08:05) (86/48 - 126/58)  BP(mean): 84 (21 Aug 2024 08:05) (62 - 86)  ABP: --  ABP(mean): --  RR: 20 (21 Aug 2024 08:05) (18 - 27)  SpO2: 100% (21 Aug 2024 08:05) (86% - 100%)    O2 Parameters below as of 21 Aug 2024 08:05  Patient On (Oxygen Delivery Method): nasal cannula w/ humidification  O2 Flow (L/min): 45          General:  HEENT:   MMM  Lymph Nodes: NO cervical LN   Lungs: Bilateral BS  Cardiovascular: Regular   Abdomen: Soft, Positive BS  Extremities: No clubbing   Skin: warm   Neurological: at baseline, not minimally responsive  Musculoskeletal: move all ext     I&O's Detail    20 Aug 2024 07:01  -  21 Aug 2024 07:00  --------------------------------------------------------  IN:    Enteral Tube Flush: 100 mL    Jevity 1.2: 130 mL    Lactated Ringers: 700 mL    Norepinephrine: 150 mL  Total IN: 1080 mL    OUT:    Indwelling Catheter - Urethral (mL): 780 mL  Total OUT: 780 mL    Total NET: 300 mL      21 Aug 2024 07:01  -  21 Aug 2024 09:09  --------------------------------------------------------  IN:  Total IN: 0 mL    OUT:    Indwelling Catheter - Urethral (mL): 25 mL  Total OUT: 25 mL    Total NET: -25 mL          LABS:                          12.8   13.83 )-----------( 269      ( 21 Aug 2024 06:32 )             39.2         21 Aug 2024 06:32    144    |  112    |  18     ----------------------------<  137    4.3     |  20     |  0.6      Ca    7.6        21 Aug 2024 06:32  Phos  2.5       21 Aug 2024 06:32  Mg     2.1       21 Aug 2024 06:32    TPro  4.7    /  Alb  2.4    /  TBili  0.4    /  DBili  x      /  AST  28     /  ALT  27     /  AlkPhos  107    21 Aug 2024 06:32  Amylase x     lipase x                                                                                        Urinalysis Basic - ( 21 Aug 2024 06:32 )    Color: x / Appearance: x / SG: x / pH: x  Gluc: 137 mg/dL / Ketone: x  / Bili: x / Urobili: x   Blood: x / Protein: x / Nitrite: x   Leuk Esterase: x / RBC: x / WBC x   Sq Epi: x / Non Sq Epi: x / Bacteria: x                                                                                                                                                 ABG - ( 20 Aug 2024 10:54 )  pH, Arterial: 7.49  pH, Blood: x     /  pCO2: 29    /  pO2: 77    / HCO3: 22    / Base Excess: -0.4  /  SaO2: 97.2                MEDICATIONS  (STANDING):  acetylcysteine 20%  Inhalation 4 milliLiter(s) Inhalation every 6 hours  albuterol/ipratropium for Nebulization 3 milliLiter(s) Nebulizer every 6 hours  atropine Injectable 0.5 milliGRAM(s) IntraMuscular once  cefepime   IVPB 1000 milliGRAM(s) IV Intermittent every 24 hours  chlorhexidine 2% Cloths 1 Application(s) Topical <User Schedule>  clotrimazole 1% Cream 1 Application(s) Topical every 12 hours  dexAMETHasone  Injectable 6 milliGRAM(s) IV Push daily  heparin   Injectable 5000 Unit(s) SubCutaneous every 12 hours  norepinephrine Infusion 0.15 MICROgram(s)/kG/Min (10.2 mL/Hr) IV Continuous <Continuous>    MEDICATIONS  (PRN):

## 2024-08-21 NOTE — PROGRESS NOTE ADULT - ASSESSMENT
Impression:   Sepsis  Bacteremia staph epi contaminant   severe end stage dementia   DEIRDRE   Transaminitis   Hypernatremia from dehydration   Dysphagia   Aspiration pneumonia   COVID positive   Bradycardia       PLAN:    CNS: AMS, dementia is advanced at baseline.     HEENT: Oral care    PULMONARY:  HOB @ 45 degrees. CXR with likely aspiration pneumonia. On HF c/w     CARDIOVASCULAR: Needs maintenance fluids. Goal MAP more than 60mmhg. still on levo, silvio while sleeping, can lower midodrine to 5mg     GI: GI prophylaxis. NG tube feeds . BMs monitor, and bowel regimen as needed     RENAL:  Follow up lytes.  Correct as needed. DEIRDRE noted. No hydro on CT. c/w LR    ID: decadron, bacteremia was a likely contaminant, c/w abx per ID     HEMATOLOGICAL:  DVT prophylaxis.     ENDOCRINE:  Follow up FS.  Insulin protocol if needed.    MUSCULOSKELETAL: Bedbound at baseline.    Prognosis is dismal; comfort measures conversation should be revisited.     [facility] Palliative consult     hospice appropriate    The patient is critically ill with a high probability of deterioration.

## 2024-08-21 NOTE — CHART NOTE - NSCHARTNOTEFT_GEN_A_CORE
Registered Dietitian Follow-Up     Patient Profile Reviewed                           Yes [X]   No []     Nutrition History Previously Obtained        Yes []  No []       Pertinent  Information:   pt is 74 year old female with hx of severe dementia, bedbound A+O x O p/w cough and decreased responsiveness at home. pt admitted with septic shock 2/2 possible aspiration PNA, severe DEIRDRE, elevated LFTs, hyponatremia. 15 s/p RR 2/2 hypotension, upgraded to CCU COVID +/sepsis.  s/p NGT placement tolerating EN thus far. BIPAP no longer warranted presently on HF     Diet, NPO with Tube Feed:   Tube Feeding Modality: Nasogastric  Jevity 1.2 Bob (JEVITY1.2RTH)  Total Volume for 24 Hours (mL): 720  Continuous  Starting Tube Feed Rate {mL per Hour}: 10  Increase Tube Feed Rate by (mL): 10     Every 4 hours  Until Goal Tube Feed Rate (mL per Hour): 30  Tube Feed Duration (in Hours): 24  Tube Feed Start Time: 13:00  Tube Feed Stop Time: 23:30  Free Water Flush  Bolus   Total Volume per Flush (mL): 100   Frequency: Every 4 Hours  Prosource Gelatein 20 Sugar Free     Qty per Day:  1 (-24 @ 12:40) [Active]      Daily Weight in k.4 (-21), Weight in k.8 (08-20), Weight in k.8 (08-16)  % Weight Change    MEDICATIONS  (STANDING):  acetylcysteine 20%  Inhalation 4 milliLiter(s) Inhalation every 6 hours  albuterol/ipratropium for Nebulization 3 milliLiter(s) Nebulizer every 6 hours  atropine Injectable 0.5 milliGRAM(s) IntraMuscular once  cefepime   IVPB 1000 milliGRAM(s) IV Intermittent every 24 hours  chlorhexidine 2% Cloths 1 Application(s) Topical <User Schedule>  clotrimazole 1% Cream 1 Application(s) Topical every 12 hours  dexAMETHasone  Injectable 6 milliGRAM(s) IV Push daily  heparin   Injectable 5000 Unit(s) SubCutaneous every 12 hours  midodrine 5 milliGRAM(s) Oral every 8 hours  norepinephrine Infusion 0.15 MICROgram(s)/kG/Min (10.2 mL/Hr) IV Continuous <Continuous>  polyethylene glycol 3350 17 Gram(s) Oral two times a day  senna 2 Tablet(s) Oral at bedtime    MEDICATIONS  (PRN):    Pertinent Labs:  @ 06:32: Na 144, BUN 18, Cr 0.6<L>, <H>, K+ 4.3, Phos 2.5, Mg 2.1, Alk Phos 107, ALT/SGPT 27, AST/SGOT 28, HbA1c --    Finger Sticks:    Physical Findings:  - Appearance: resting in bed  - GI function: +BS, BM Noted   - Tubes: NGT   - Oral/Mouth cavity:  - Skin: unstagable to R buttock, open blisters to B/L lateral heels  - Edema: generalized      Nutrition Requirements:  Weight Used: 36.3 kgs     Estimated Energy Needs   9494-8251 kcals (30-35 kcal/kg/BW)  43.5-51g protein (1.2-1.4g/kg/BW)  1;1 kcal for estimated fluid needs     Nutrient Intake: present EN regimen provides pt with 864+60 kcals, 40+15g protein and 720 ml total volume meeting >80% of estimated needs    [] Previous Nutrition Diagnosis:            [] Ongoing          []X Resolved    inadequate pro-energy intake resolved     [] No active nutrition diagnosis identified at this time     Nutrition Education: n/a      Goal/Expected Outcome: pt to tolerate EN and meet >80% of estimated needs within 3-5 days     Indicator/Monitoring: tolerance to EN, labs/meds, NFPF    Recommendation: change EN to glucerna 1.2 at 30 ml/hr increase as tolerated to goal rate of 50 ml/hrx18 hrs with H20 flushes of 100 ml pre and post feed will provide pt with 1080 kcals, 53g protein and 900+200 ml total volume,  high risk Registered Dietitian Follow-Up     Patient Profile Reviewed                           Yes [X]   No []     Nutrition History Previously Obtained        Yes []  No []       Pertinent  Information:   pt is 74 year old female with hx of severe dementia, bedbound A+O x O p/w cough and decreased responsiveness at home. pt admitted with septic shock 2/2 possible aspiration PNA, severe DEIRDRE, elevated LFTs, hyponatremia. 15 s/p RR 2/2 hypotension, upgraded to CCU COVID +/sepsis.  s/p NGT placement tolerating EN thus far. BIPAP no longer warranted presently on HF     Diet, NPO with Tube Feed:   Tube Feeding Modality: Nasogastric  Jevity 1.2 Bob (JEVITY1.2RTH)  Total Volume for 24 Hours (mL): 720  Continuous  Starting Tube Feed Rate {mL per Hour}: 10  Increase Tube Feed Rate by (mL): 10     Every 4 hours  Until Goal Tube Feed Rate (mL per Hour): 30  Tube Feed Duration (in Hours): 24  Tube Feed Start Time: 13:00  Tube Feed Stop Time: 23:30  Free Water Flush  Bolus   Total Volume per Flush (mL): 100   Frequency: Every 4 Hours  Prosource Gelatein 20 Sugar Free     Qty per Day:  1 (-24 @ 12:40) [Active]      Daily Weight in k.4 (-21), Weight in k.8 (08-20), Weight in k.8 (08-16)  % Weight Change    MEDICATIONS  (STANDING):  acetylcysteine 20%  Inhalation 4 milliLiter(s) Inhalation every 6 hours  albuterol/ipratropium for Nebulization 3 milliLiter(s) Nebulizer every 6 hours  atropine Injectable 0.5 milliGRAM(s) IntraMuscular once  cefepime   IVPB 1000 milliGRAM(s) IV Intermittent every 24 hours  chlorhexidine 2% Cloths 1 Application(s) Topical <User Schedule>  clotrimazole 1% Cream 1 Application(s) Topical every 12 hours  dexAMETHasone  Injectable 6 milliGRAM(s) IV Push daily  heparin   Injectable 5000 Unit(s) SubCutaneous every 12 hours  midodrine 5 milliGRAM(s) Oral every 8 hours  norepinephrine Infusion 0.15 MICROgram(s)/kG/Min (10.2 mL/Hr) IV Continuous <Continuous>  polyethylene glycol 3350 17 Gram(s) Oral two times a day  senna 2 Tablet(s) Oral at bedtime    MEDICATIONS  (PRN):    Pertinent Labs:  @ 06:32: Na 144, BUN 18, Cr 0.6<L>, <H>, K+ 4.3, Phos 2.5, Mg 2.1, Alk Phos 107, ALT/SGPT 27, AST/SGOT 28, HbA1c --    Finger Sticks:    Physical Findings:  - Appearance: resting in bed  - GI function: +BS, BM Noted   - Tubes: NGT   - Oral/Mouth cavity:  - Skin: unstagable to R buttock, open blisters to B/L lateral heels  - Edema: generalized      Nutrition Requirements:  Weight Used: 36.3 kgs     Estimated Energy Needs   3968-6736 kcals (30-35 kcal/kg/BW)  43.5-51g protein (1.2-1.4g/kg/BW)  1;1 kcal for estimated fluid needs     Nutrient Intake: present EN regimen provides pt with 864+60 kcals, 40+15g protein and 720 ml total volume meeting >80% of estimated needs    [] Previous Nutrition Diagnosis:            [] Ongoing          []X Resolved    inadequate pro-energy intake resolved     [] No active nutrition diagnosis identified at this time     Nutrition Education: n/a      Goal/Expected Outcome: pt to tolerate EN and meet >80% of estimated needs within 3-5 days     Indicator/Monitoring: tolerance to EN, labs/meds, NFPF    Recommendation: change EN to glucerna 1.2 240 ml q 6 hrs with H20 flushes of 50 ml pre and post feed will provide pt with 1152 kcals, 57g protein and 960+400 ml total volume meeting >80% of estimated needs.

## 2024-08-21 NOTE — PROGRESS NOTE ADULT - ASSESSMENT
74F with severe dementia bedbound aox 0 presents by ambulance for increased work of breathing, cough, and decreased responsiveness at home and found with COVID and pneumonia     acute respiratory failure / COVID / possible gram negative pneumonia     - 5 days RDV   - complete 10 days of decadron   - complete course of Cefepime   - titrate oxygen down as tolerated   - DVT prophylaxis   - poor prognosis

## 2024-08-22 NOTE — PROGRESS NOTE ADULT - ASSESSMENT
Impression:   Sepsis- stable   Hypoxic respiratory failure- unable to wean the HF  Aspiration pneumonia - unchanged on X ray   COVID positive   Bacteremia staph epi contaminant -  severe end stage dementia   DEIRDRE - resolved   Transaminitis   Hypernatremia from dehydration - resovolved  Dysphagia - has NGT  nocturnal Bradycardia - improved with NIV       PLAN:    CNS: AMS, dementia is advanced at baseline.     HEENT: Oral care    PULMONARY:  HOB @ 45 degrees. On HF c/w unable to wean needs c/w HF NIV at night     CARDIOVASCULAR: Goal MAP more than 60mmhg. still on levo,c/w to wean, continue midodrine    GI: GI prophylaxis. NG tube feeds . BMs monitor, and bowel regimen as needed    RENAL:  Follow up lytes.  Correct as needed. DEIRDRE noted. No hydro on CT. c/w LR    ID: decadron to be completed, and cefepime pending completion. ID recs appreciated.     HEMATOLOGICAL:  DVT prophylaxis.     ENDOCRINE:  Follow up FS.  Insulin protocol if needed.    MUSCULOSKELETAL: Bedbound at baseline.    Prognosis is dismal; comfort measures conversation should be revisited.     [facility] Palliative consult     hospice appropriate    The patient is critically ill with a high probability of deterioration.             Impression:   Sepsis- stable   Hypoxic respiratory failure- unable to wean the HF  Aspiration pneumonia - unchanged on X ray   COVID positive   Bacteremia staph epi contaminant -  severe end stage dementia   DEIRDRE - resolved   Transaminitis   Hypernatremia from dehydration - resovolved  Dysphagia - has NGT  nocturnal Bradycardia - improved with NIV       PLAN:    CNS: AMS, dementia is advanced at baseline.     HEENT: Oral care    PULMONARY:  HOB @ 45 degrees. On HF c/w unable to wean needs c/w HF NIV at night trial of NC today     CARDIOVASCULAR: Goal MAP more than 60mmhg. still on levo,c/w to wean, continue midodrine    GI: GI prophylaxis. NG tube feeds . BMs monitor, and bowel regimen as needed    RENAL:  Follow up lytes.  Correct as needed. DEIRDRE noted. No hydro on CT. c/w LR    ID: decadron to be completed, and cefepime pending completion. ID recs appreciated.     HEMATOLOGICAL:  DVT prophylaxis.     ENDOCRINE:  Follow up FS.  Insulin protocol if needed.    MUSCULOSKELETAL: Bedbound at baseline.    Prognosis is dismal; comfort measures conversation should be revisited.     [facility] Palliative consult     hospice appropriate    The patient is critically ill with a high probability of deterioration.

## 2024-08-22 NOTE — PROGRESS NOTE ADULT - SUBJECTIVE AND OBJECTIVE BOX
INFECTIOUS DISEASE FOLLOW UP NOTE:    Interval History/ROS: Patient is a 74y old  Female who presents with a chief complaint of septic shock (22 Aug 2024 08:20)    Overnight events: On HFNC. Opens her eyes.     REVIEW OF SYSTEMS:  Unable to provide due to cognitive impairment      Prior hospital charts reviewed [Yes]  Primary team notes reviewed [Yes]  Other consultant notes reviewed [Yes]    Allergies:  ciprofloxacin (Unknown)  sulfa drugs (Unknown)  erythromycin (Unknown)  penicillin (Unknown)  penicillins (Unknown)      ANTIMICROBIALS:   cefepime   IVPB 1000 every 24 hours      OTHER MEDS: MEDICATIONS  (STANDING):  acetylcysteine 20%  Inhalation 4 every 6 hours  albuterol/ipratropium for Nebulization 3 every 6 hours  atropine Injectable 0.5 once  dexAMETHasone  Injectable 6 daily  heparin   Injectable 5000 every 12 hours  midodrine 10 every 8 hours  norepinephrine Infusion 0.15 <Continuous>  polyethylene glycol 3350 17 two times a day  senna 2 at bedtime      Vital Signs Last 24 Hrs  T(F): 98.1 (08-22-24 @ 15:00), Max: 98.2 (08-19-24 @ 06:00)    Vital Signs Last 24 Hrs  HR: 122 (08-22-24 @ 17:00) (45 - 122)  BP: 128/79 (08-22-24 @ 17:00) (87/49 - 147/65)  RR: 41 (08-22-24 @ 17:00)  SpO2: 96% (08-22-24 @ 17:00) (89% - 100%)  Wt(kg): --    EXAM:  GENERAL: NAD, lying in bed. very frail and weak  HEAD: No head lesions  EYES: Conjunctiva pink and cornea white  EAR, NOSE, MOUTH, THROAT: Normal external ears and nose, no discharges; dry mucous membranes; + HFNC  NECK: Supple, no JVD  RESPIRATORY: Diffuse rhonchi  CARDIOVASCULAR: S1 S2, tachycardia  GASTROINTESTINAL: Soft, nontender, nondistended; normoactive bowel sounds  GENITOURINARY: + trujillo catheter  EXTREMITIES: Severely contracted extremities  NERVOUS SYSTEM: Not responsive, not oriented  MUSCULOSKELETAL: No joint erythema, swelling  SKIN: Sacral ulcer stage 2, no purulence. no superficial thrombophlebitis  PSYCH: Lethargic    Labs:                        12.2   12.03 )-----------( 344      ( 22 Aug 2024 06:43 )             37.2     08-22    144  |  111<H>  |  20  ----------------------------<  131<H>  4.1   |  22  |  0.6<L>    Ca    7.6<L>      22 Aug 2024 06:43  Phos  2.2     08-22  Mg     1.9     08-22    TPro  4.6<L>  /  Alb  2.3<L>  /  TBili  0.4  /  DBili  x   /  AST  21  /  ALT  23  /  AlkPhos  102  08-22      WBC Trend:  WBC Count: 12.03 (08-22-24 @ 06:43)  WBC Count: 13.83 (08-21-24 @ 06:32)  WBC Count: 15.40 (08-20-24 @ 11:00)  WBC Count: 15.22 (08-20-24 @ 04:30)      Creatine Trend:  Creatinine: 0.6 (08-22)  Creatinine: 0.6 (08-21)  Creatinine: 0.6 (08-20)  Creatinine: 0.5 (08-20)      Liver Biochemical Testing Trend:  Alanine Aminotransferase (ALT/SGPT): 23 (08-22)  Alanine Aminotransferase (ALT/SGPT): 27 (08-21)  Alanine Aminotransferase (ALT/SGPT): 27 (08-20)  Alanine Aminotransferase (ALT/SGPT): 26 (08-20)  Alanine Aminotransferase (ALT/SGPT): 26 (08-19)  Aspartate Aminotransferase (AST/SGOT): 21 (08-22-24 @ 06:43)  Aspartate Aminotransferase (AST/SGOT): 28 (08-21-24 @ 06:32)  Aspartate Aminotransferase (AST/SGOT): 22 (08-20-24 @ 11:00)  Aspartate Aminotransferase (AST/SGOT): 19 (08-20-24 @ 04:30)  Aspartate Aminotransferase (AST/SGOT): 19 (08-19-24 @ 08:00)  Bilirubin Total: 0.4 (08-22)  Bilirubin Total: 0.4 (08-21)  Bilirubin Total: 0.4 (08-20)  Bilirubin Total: 0.4 (08-20)  Bilirubin Total: 0.4 (08-19)      Trend LDH      Urinalysis Basic - ( 22 Aug 2024 06:43 )    Color: x / Appearance: x / SG: x / pH: x  Gluc: 131 mg/dL / Ketone: x  / Bili: x / Urobili: x   Blood: x / Protein: x / Nitrite: x   Leuk Esterase: x / RBC: x / WBC x   Sq Epi: x / Non Sq Epi: x / Bacteria: x        MICROBIOLOGY:  Vancomycin Level, Random: 33.8 (08-16 @ 06:30)  Vancomycin Level, Random: 12.1 (08-15 @ 10:53)  Vancomycin Level, Trough: 15.9 (08-15 @ 07:00)    MRSA PCR Result.: Negative (08-14-24 @ 19:10)      Urinalysis with Rflx Culture (collected 14 Aug 2024 08:40)    Culture - Blood (collected 14 Aug 2024 08:30)  Source: .Blood Blood-Peripheral  Final Report:    Growth in aerobic bottle: Staphylococcus epidermidis    "Susceptibilities not performed"    Direct identification is available within approximately 3-5    hours either by Blood Panel Multiplexed PCR or Direct    MALDI-TOF. Details: [email or web address]  Organism: Blood Culture PCR  Organism: Blood Culture PCR    Sensitivities:      -  Staphylococcus epidermidis, Methicillin resistant: Detec      Method Type: PCR    Culture - Blood (collected 14 Aug 2024 08:30)  Source: .Blood Blood-Peripheral  Final Report:    Growth in anaerobic bottle: Staphylococcus epidermidis  Organism: Staphylococcus epidermidis  Organism: Staphylococcus epidermidis    Sensitivities:      -  Clindamycin: S <=0.25      -  Oxacillin: S <=0.25      -  Gentamicin: S <=1 Should not be used as monotherapy      -  Vancomycin: S 1      -  Tetracycline: S <=1      Method Type: RAPHAEL      -  Penicillin: R 1      -  Rifampin: S <=1 Should not be used as monotherapy      -  Erythromycin: R >4      -  Trimethoprim/Sulfamethoxazole: R >2/38    Procalcitonin: 0.16 (08-21)    Blood Gas Arterial, Lactate: 1.2 (08-20 @ 10:54)      RADIOLOGY:  imaging below personally reviewed    < from: Xray Chest 1 View- PORTABLE-Routine (Xray Chest 1 View- PORTABLE-Routine in AM.) (08.22.24 @ 07:35) >  Impression:    Stable bilateral lung opacities    < end of copied text >

## 2024-08-22 NOTE — PROGRESS NOTE ADULT - SUBJECTIVE AND OBJECTIVE BOX
HPI: 73 yo F with severe dementia bedbound aox0 presents by ambulance for increased work of breathing, cough, and decreased responsiveness at home.     INTERVAL EVENTS  Patient remains on HFNC and unable to participate in exam    ADVANCE DIRECTIVES:    [ ] Full Code [ x] DNR  MOLST  [ ]  Living Will  [ ]   DECISION MAKER(s):  [ ] Health Care Proxy(s)  [x ] Surrogate(s)  [ ] Guardian           Name(s): Phone Number(s): Children including son Gerardo     BASELINE (I)ADL(s) (prior to admission):  Natalia: [ ]Total  [ ] Moderate [ ]Dependent  Palliative Performance Status Version 2:         %    [email or web address]    Allergies    ciprofloxacin (Unknown)  sulfa drugs (Unknown)  erythromycin (Unknown)  penicillin (Unknown)  penicillins (Unknown)    Intolerances    MEDICATIONS  (STANDING):  acetylcysteine 20%  Inhalation 4 milliLiter(s) Inhalation every 6 hours  albuterol/ipratropium for Nebulization 3 milliLiter(s) Nebulizer every 6 hours  atropine Injectable 0.5 milliGRAM(s) IntraMuscular once  chlorhexidine 2% Cloths 1 Application(s) Topical <User Schedule>  clotrimazole 1% Cream 1 Application(s) Topical every 12 hours  dexAMETHasone  Injectable 6 milliGRAM(s) IV Push daily  heparin   Injectable 5000 Unit(s) SubCutaneous every 12 hours  midodrine 10 milliGRAM(s) Oral every 8 hours  norepinephrine Infusion 0.15 MICROgram(s)/kG/Min (10.2 mL/Hr) IV Continuous <Continuous>  polyethylene glycol 3350 17 Gram(s) Oral two times a day  senna 2 Tablet(s) Oral at bedtime    MEDICATIONS  (PRN):      PRESENT SYMPTOMS: [x ]Unable to obtain due to poor mentation   Source if other than patient:  [ ]Family   [ ]Team     Pain: [ ]yes [ ]no  QOL impact -   Location -                    Aggravating factors -  Quality -  Radiation -  Timing-  Severity (0-10 scale):  Minimal acceptable level (0-10 scale):     CPOT:  0  https://www.[facility].org/getattachment/won42k64-7k4j-1p0l-1i0m-8004o3090c9n/Critical-Care-Pain-Observation-Tool-(CPOT)    PAIN AD Score:   [email or web address]/painad.pdf (press ctrl +  left click to view)    Dyspnea:                           [ ]None[ ]Mild [ ]Moderate [ ]Severe     Respiratory Distress Observation Scale (RDOS): 0  A score of 0 to 2 signifies little or no respiratory distress, 3 signifies mild distress, scores 4 to 6 indicate moderate distress, and scores greater than 7 signify severe distress  [email or web address]-kqrsujnzrht-[email or web address]-usbrm.[email or web address]    Anxiety:                             [ ]None[ ]Mild [ ]Moderate [ ]Severe   Fatigue:                             [ ]None[ ]Mild [ ]Moderate [ ]Severe   Nausea:                             [ ]None[ ]Mild [ ]Moderate [ ]Severe   Loss of appetite:              [ ]None[ ]Mild [ ]Moderate [ ]Severe   Constipation:                    [ ]None[ ]Mild [ ]Moderate [ ]Severe    Other Symptoms:  [ ]All other review of systems negative     Palliative Performance Status Version 2:        20 %    [email or web address]_[email or web address]_[email or web address].pdf  PHYSICAL EXAM:  Vital Signs Last 24 Hrs  T(C): 36.9 (22 Aug 2024 19:00), Max: 36.9 (22 Aug 2024 19:00)  T(F): 98.4 (22 Aug 2024 19:00), Max: 98.4 (22 Aug 2024 19:00)  HR: 116 (22 Aug 2024 20:00) (45 - 124)  BP: 119/56 (22 Aug 2024 20:00) (87/49 - 147/65)  BP(mean): 80 (22 Aug 2024 20:00) (63 - 99)  RR: 18 (22 Aug 2024 21:00) (14 - 41)  SpO2: 99% (22 Aug 2024 20:00) (89% - 100%)    Parameters below as of 22 Aug 2024 21:00  Patient On (Oxygen Delivery Method): BiPAP/CPAP        GENERAL:  [X ] No acute distress [ ]Lethargic  [ ]Unarousable  [ ]Verbal  [ ]Non-Verbal [ ]Cachexia    BEHAVIORAL/PSYCH:  [ ]Alert and Oriented x  [ ] Anxiety [ ] Delirium [ ] Agitation [X ] Calm   EYES: [ ] No scleral icterus [ ] Scleral icterus [ ] Closed  ENMT:  [ ]Dry mouth  [ ]No external oral lesions [ ] No external ear or nose lesions  CARDIOVASCULAR:  [ ]Regular [ ]Irregular [ ]Tachy [ ]Not Tachy  [ ]Justice [ ] Edema [ ] No edema  PULMONARY:  [ ]Tachypnea  [ ]Audible excessive secretions [X ] No labored breathing [ ] labored breathing  GASTROINTESTINAL: [ ]Soft  [ ]Distended  [ X]Not distended [ ]Non tender [ ]Tender  MUSCULOSKELETAL: [ ]No clubbing [ ] clubbing  [ ] No cyanosis [ ] cyanosis  NEUROLOGIC: [ ]No focal deficits  [ ]Follows commands  [x ]Does not follow commands  [x ]Cognitive impairment  [ ]Dysphagia  [ ]Dysarthria  [ ]Paresis   SKIN: [ ] Jaundiced [X ] Non-jaundiced [ ]Rash [ ]No Rash [ ] Warm [ ] Dry  MISC/LINES: [ ] ET tube [ ] Trach [x ]NGT/OGT [ ]PEG [ ]Fischer    LABS: reviewed by me, leukocytosis                                   12.2   12.03 )-----------( 344      ( 22 Aug 2024 06:43 )             37.2       08-22    144  |  111<H>  |  20  ----------------------------<  131<H>  4.1   |  22  |  0.6<L>    Ca    7.6<L>      22 Aug 2024 06:43  Phos  2.2     08-22  Mg     1.9     08-22    TPro  4.6<L>  /  Alb  2.3<L>  /  TBili  0.4  /  DBili  x   /  AST  21  /  ALT  23  /  AlkPhos  102  08-22              Urinalysis Basic - ( 22 Aug 2024 06:43 )    Color: x / Appearance: x / SG: x / pH: x  Gluc: 131 mg/dL / Ketone: x  / Bili: x / Urobili: x   Blood: x / Protein: x / Nitrite: x   Leuk Esterase: x / RBC: x / WBC x   Sq Epi: x / Non Sq Epi: x / Bacteria: x                  CAPILLARY BLOOD GLUCOSE                  RADIOLOGY & ADDITIONAL STUDIES: reviewed by me    < from: Xray Chest 1 View- PORTABLE-Routine (Xray Chest 1 View- PORTABLE-Routine in AM.) (08.22.24 @ 07:35) >  Impression:    Stable bilateral lung opacities    < end of copied text >    < from: 12 Lead ECG (08.20.24 @ 09:59) >    Ventricular Rate 49 BPM    Atrial Rate 49 BPM    P-R Interval 134 ms    QRS Duration 70 ms    Q-T Interval 550 ms    QTC Calculation(Bazett) 496 ms    P Axis 10 degrees    R Axis 15 degrees    T Axis 221 degrees    Diagnosis Line Sinus bradycardia  Low voltage QRS  T wave abnormality, consider inferior ischemia  Abnormal ECG    < end of copied text >      PROTEIN CALORIE MALNUTRITION PRESENT: [ ]mild [ ]moderate [ ]severe [ ]underweight [ ]morbid obesity  https://www.[facility].org/vault/2440/web/files/ONC/Table_Clinical%20Characteristics%20to%20Document%20Malnutrition-White%20JV%20et%20al%244604.pdf    Height (cm): 147.3 (08-14-24 @ 07:49)  Weight (kg): 36.3 (08-14-24 @ 07:49)  BMI (kg/m2): 16.7 (08-14-24 @ 07:49)    [ ]PPSV2 < or = to 30% [ ]significant weight loss  [ ]poor nutritional intake  [ ]anasarca      [ ]Artificial Nutrition          Palliative Care Spiritual/Emotional Screening Tool Question  Severity (0-4):                    OR                    [X ] Unable to determine/NA  Score of 2 or greater indicates recommendation of Chaplaincy referral  Chaplaincy Referral: [ ] Yes [ ] Refused [ ] Following     Caregiver [place]:  [ ] Yes [ ] No    OR    [x ] Unable to determine. Will assess at later time if appropriate.  Social Work Referral [ ]  Patient and Family Centered Care Referral [ ]    Anticipatory Grief Present: [ ] Yes [ ] No    OR     [ x] Unable to determine. Will assess at later time if appropriate.  Social Work Referral [ ]  Patient and Family Centered Care Referral [ ]    REFERRALS:   [ ]Chaplaincy  [ ]Hospice  [ ]Child Life  [ ]Social Work  [ ]Case management [ ]Holistic Therapy     Palliative care education provided to patient and/or family

## 2024-08-22 NOTE — PROGRESS NOTE ADULT - ASSESSMENT
74F with severe dementia bedbound aox 0 presents by ambulance for increased work of breathing, cough, and decreased responsiveness at home and found with COVID and pneumonia     acute respiratory failure / COVID / possible gram negative pneumonia     - completed 5 days RDV   - complete 10 days of decadron   - complete course of Cefepime   - titrate pressors to maintain MAP>65   - titrate oxygen down as tolerated   - DVT prophylaxis   - poor prognosis

## 2024-08-22 NOTE — PROGRESS NOTE ADULT - ASSESSMENT
75 yo F with severe dementia bedbound aox0 presents by ambulance for increased work of breathing, cough, and decreased responsiveness at home.    Patient remains on HFNC with NGT in place.  Attempted to call patient's son and unable to leave message.    - pressors weaned; continue midodrine  - on HFNC  - family interested in NGT trial for now, but not PEG per last discussion  - will follow    [phone] PRN

## 2024-08-22 NOTE — PROGRESS NOTE ADULT - SUBJECTIVE AND OBJECTIVE BOX
Patient is a 74y old  Female who presents with a chief complaint of septic shock (21 Aug 2024 18:13)      Over Night Events:  Patient seen and examined.       ROS:  See HPI    PHYSICAL EXAM    ICU Vital Signs Last 24 Hrs  T(C): 36.3 (22 Aug 2024 07:00), Max: 36.7 (21 Aug 2024 19:01)  T(F): 97.3 (22 Aug 2024 07:00), Max: 98.1 (21 Aug 2024 19:01)  HR: 61 (22 Aug 2024 07:01) (48 - 91)  BP: 147/65 (22 Aug 2024 07:01) (97/54 - 147/65)  BP(mean): 94 (22 Aug 2024 07:01) (74 - 94)  ABP: --  ABP(mean): --  RR: 26 (22 Aug 2024 07:01) (18 - 262)  SpO2: 98% (22 Aug 2024 07:01) (96% - 99%)    O2 Parameters below as of 22 Aug 2024 07:01  Patient On (Oxygen Delivery Method): nasal cannula, high flow  O2 Flow (L/min): 45  O2 Concentration (%): 45        General:  HEENT:                Lymph Nodes: NO cervical LN   Lungs: Bilateral BS  Cardiovascular: Regular   Abdomen: Soft, Positive BS  Extremities: No clubbing   Skin: warm   Neurological:   Musculoskeletal: move all ext     I&O's Detail    21 Aug 2024 07:01  -  22 Aug 2024 07:00  --------------------------------------------------------  IN:    Enteral Tube Flush: 100 mL    IV PiggyBack: 50 mL    Jevity 1.2: 100 mL    Norepinephrine: 104 mL  Total IN: 354 mL    OUT:    Indwelling Catheter - Urethral (mL): 450 mL  Total OUT: 450 mL    Total NET: -96 mL          LABS:                          12.2   12.03 )-----------( 344      ( 22 Aug 2024 06:43 )             37.2         22 Aug 2024 06:43    144    |  111    |  20     ----------------------------<  131    4.1     |  22     |  0.6      Ca    7.6        22 Aug 2024 06:43  Phos  2.2       22 Aug 2024 06:43  Mg     1.9       22 Aug 2024 06:43    TPro  4.6    /  Alb  2.3    /  TBili  0.4    /  DBili  x      /  AST  21     /  ALT  23     /  AlkPhos  102    22 Aug 2024 06:43  Amylase x     lipase x                                                                                        Urinalysis Basic - ( 22 Aug 2024 06:43 )    Color: x / Appearance: x / SG: x / pH: x  Gluc: 131 mg/dL / Ketone: x  / Bili: x / Urobili: x   Blood: x / Protein: x / Nitrite: x   Leuk Esterase: x / RBC: x / WBC x   Sq Epi: x / Non Sq Epi: x / Bacteria: x                                                                                                                                                 ABG - ( 20 Aug 2024 10:54 )  pH, Arterial: 7.49  pH, Blood: x     /  pCO2: 29    /  pO2: 77    / HCO3: 22    / Base Excess: -0.4  /  SaO2: 97.2                MEDICATIONS  (STANDING):  acetylcysteine 20%  Inhalation 4 milliLiter(s) Inhalation every 6 hours  albuterol/ipratropium for Nebulization 3 milliLiter(s) Nebulizer every 6 hours  atropine Injectable 0.5 milliGRAM(s) IntraMuscular once  cefepime   IVPB 1000 milliGRAM(s) IV Intermittent every 24 hours  chlorhexidine 2% Cloths 1 Application(s) Topical <User Schedule>  clotrimazole 1% Cream 1 Application(s) Topical every 12 hours  dexAMETHasone  Injectable 6 milliGRAM(s) IV Push daily  heparin   Injectable 5000 Unit(s) SubCutaneous every 12 hours  midodrine 5 milliGRAM(s) Oral every 8 hours  norepinephrine Infusion 0.15 MICROgram(s)/kG/Min (10.2 mL/Hr) IV Continuous <Continuous>  polyethylene glycol 3350 17 Gram(s) Oral two times a day  senna 2 Tablet(s) Oral at bedtime    MEDICATIONS  (PRN):               Patient is a 74y old  Female who presents with a chief complaint of septic shock (21 Aug 2024 18:13)      Over Night Events:  Patient seen and examined.   on NC   still low dose levo     ROS:  See HPI    PHYSICAL EXAM    ICU Vital Signs Last 24 Hrs  T(C): 36.3 (22 Aug 2024 07:00), Max: 36.7 (21 Aug 2024 19:01)  T(F): 97.3 (22 Aug 2024 07:00), Max: 98.1 (21 Aug 2024 19:01)  HR: 61 (22 Aug 2024 07:01) (48 - 91)  BP: 147/65 (22 Aug 2024 07:01) (97/54 - 147/65)  BP(mean): 94 (22 Aug 2024 07:01) (74 - 94)  ABP: --  ABP(mean): --  RR: 26 (22 Aug 2024 07:01) (18 - 262)  SpO2: 98% (22 Aug 2024 07:01) (96% - 99%)    O2 Parameters below as of 22 Aug 2024 07:01  Patient On (Oxygen Delivery Method): nasal cannula, high flow  O2 Flow (L/min): 45  O2 Concentration (%): 45        General: awake   HEENT:           nasal cannula      Lymph Nodes: NO cervical LN   Lungs: Bilateral BS  Cardiovascular: Regular   Abdomen: Soft, Positive BS  Extremities: No clubbing   Skin: warm   Neurological:   Musculoskeletal: move all ext     I&O's Detail    21 Aug 2024 07:01  -  22 Aug 2024 07:00  --------------------------------------------------------  IN:    Enteral Tube Flush: 100 mL    IV PiggyBack: 50 mL    Jevity 1.2: 100 mL    Norepinephrine: 104 mL  Total IN: 354 mL    OUT:    Indwelling Catheter - Urethral (mL): 450 mL  Total OUT: 450 mL    Total NET: -96 mL          LABS:                          12.2   12.03 )-----------( 344      ( 22 Aug 2024 06:43 )             37.2         22 Aug 2024 06:43    144    |  111    |  20     ----------------------------<  131    4.1     |  22     |  0.6      Ca    7.6        22 Aug 2024 06:43  Phos  2.2       22 Aug 2024 06:43  Mg     1.9       22 Aug 2024 06:43    TPro  4.6    /  Alb  2.3    /  TBili  0.4    /  DBili  x      /  AST  21     /  ALT  23     /  AlkPhos  102    22 Aug 2024 06:43  Amylase x     lipase x                                                                                        Urinalysis Basic - ( 22 Aug 2024 06:43 )    Color: x / Appearance: x / SG: x / pH: x  Gluc: 131 mg/dL / Ketone: x  / Bili: x / Urobili: x   Blood: x / Protein: x / Nitrite: x   Leuk Esterase: x / RBC: x / WBC x   Sq Epi: x / Non Sq Epi: x / Bacteria: x                                                                                                                                                 ABG - ( 20 Aug 2024 10:54 )  pH, Arterial: 7.49  pH, Blood: x     /  pCO2: 29    /  pO2: 77    / HCO3: 22    / Base Excess: -0.4  /  SaO2: 97.2                MEDICATIONS  (STANDING):  acetylcysteine 20%  Inhalation 4 milliLiter(s) Inhalation every 6 hours  albuterol/ipratropium for Nebulization 3 milliLiter(s) Nebulizer every 6 hours  atropine Injectable 0.5 milliGRAM(s) IntraMuscular once  cefepime   IVPB 1000 milliGRAM(s) IV Intermittent every 24 hours  chlorhexidine 2% Cloths 1 Application(s) Topical <User Schedule>  clotrimazole 1% Cream 1 Application(s) Topical every 12 hours  dexAMETHasone  Injectable 6 milliGRAM(s) IV Push daily  heparin   Injectable 5000 Unit(s) SubCutaneous every 12 hours  midodrine 5 milliGRAM(s) Oral every 8 hours  norepinephrine Infusion 0.15 MICROgram(s)/kG/Min (10.2 mL/Hr) IV Continuous <Continuous>  polyethylene glycol 3350 17 Gram(s) Oral two times a day  senna 2 Tablet(s) Oral at bedtime    MEDICATIONS  (PRN):

## 2024-08-22 NOTE — PROGRESS NOTE ADULT - ASSESSMENT
74F with severe dementia bedbound aox0 presents by ambulance for increased work of breathing, cough, and decreased responsiveness at home.    ID is consulted for sepsis  Hypothermia to 94.5, WBC 11.03  Hypoxemia requiring NRB  Lactate 6.8 > 4.1 > 2.4  BCx 8/14 CoNS, likely procurement contamination  UA WBC 2  COVID +    CXR with Bibasilar opacities  CT A/P wo contrast 8/14  Right basilar pleural effusion with adjacent atelectasis/consolidation.   Apparent occlusion of the right lower lobe bronchus causing atelectasis.   Mild left basilar atelectasis/consolidation. Infection is not excluded.   Consider interval follow-up chest CT with IV contrast for further   evaluation.    Antibiotics:  Cefepime 8/14 ->  Vancomycin 8/14      IMPRESSION:  RLL pneumonia, aspiration?  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  Attending Physician  Division of Infectious Diseases  [facility]  Please contact me via [employer] Teams

## 2024-08-22 NOTE — PROGRESS NOTE ADULT - SUBJECTIVE AND OBJECTIVE BOX
Patient seen and evaluated this am, remains on HF oxygen and low dose levophed       T(F): 98.1 (08-22-24 @ 15:00), Max: 98.1 (08-21-24 @ 19:01)  HR: 122 (08-22-24 @ 17:00)  BP: 128/79 (08-22-24 @ 17:00)  RR: 22  SpO2: 96% (08-22-24 @ 17:00) (89% - 100%)    PHYSICAL EXAM:  GENERAL: NAD  HEAD:  Atraumatic, Normocephalic  EYES: EOMI, PERRLA, conjunctiva and sclera clear  NERVOUS SYSTEM: no focal deficits   CHEST/LUNG:  bilateral rales  HEART: Regular rate and rhythm; No murmurs, rubs, or gallops  ABDOMEN: Soft, Nontender, Nondistended; Bowel sounds present  EXTREMITIES:  2+ Peripheral Pulses, No clubbing, cyanosis, or edema    LABS  08-22    144  |  111<H>  |  20  ----------------------------<  131<H>  4.1   |  22  |  0.6<L>    Ca    7.6<L>      22 Aug 2024 06:43  Phos  2.2     08-22  Mg     1.9     08-22    TPro  4.6<L>  /  Alb  2.3<L>  /  TBili  0.4  /  DBili  x   /  AST  21  /  ALT  23  /  AlkPhos  102  08-22                          12.2   12.03 )-----------( 344      ( 22 Aug 2024 06:43 )             37.2       Culture Results:   Growth in aerobic bottle: Staphylococcus epidermidis  "Susceptibilities not performed"  Direct identification is available within approximately 3-5  hours either by Blood Panel Multiplexed PCR or Direct  MALDI-TOF. Details: [email or web address] (08-14-24)  Culture Results:   Growth in anaerobic bottle: Staphylococcus epidermidis (08-14-24)    RADIOLOGY  < from: Xray Chest 1 View- PORTABLE-Routine (Xray Chest 1 View- PORTABLE-Routine in AM.) (08.22.24 @ 07:35) >  Impression:    Stable bilateral lung opacities      < end of copied text >    MEDICATIONS  (STANDING):  acetylcysteine 20%  Inhalation 4 milliLiter(s) Inhalation every 6 hours  albuterol/ipratropium for Nebulization 3 milliLiter(s) Nebulizer every 6 hours  atropine Injectable 0.5 milliGRAM(s) IntraMuscular once  chlorhexidine 2% Cloths 1 Application(s) Topical <User Schedule>  clotrimazole 1% Cream 1 Application(s) Topical every 12 hours  dexAMETHasone  Injectable 6 milliGRAM(s) IV Push daily  heparin   Injectable 5000 Unit(s) SubCutaneous every 12 hours  midodrine 10 milliGRAM(s) Oral every 8 hours  norepinephrine Infusion 0.15 MICROgram(s)/kG/Min (10.2 mL/Hr) IV Continuous <Continuous>  polyethylene glycol 3350 17 Gram(s) Oral two times a day  senna 2 Tablet(s) Oral at bedtime    MEDICATIONS  (PRN):

## 2024-08-23 NOTE — PROGRESS NOTE ADULT - SUBJECTIVE AND OBJECTIVE BOX
INFECTIOUS DISEASE FOLLOW UP NOTE:    Interval History/ROS: Patient is a 74y old  Female who presents with a chief complaint of septic shock (23 Aug 2024 08:30)      Overnight events:    REVIEW OF SYSTEMS:        Prior hospital charts reviewed [Yes]  Primary team notes reviewed [Yes]  Other consultant notes reviewed [Yes]    Allergies:  ciprofloxacin (Unknown)  sulfa drugs (Unknown)  erythromycin (Unknown)  penicillin (Unknown)  penicillins (Unknown)      ANTIMICROBIALS:       OTHER MEDS: MEDICATIONS  (STANDING):  acetylcysteine 20%  Inhalation 4 every 6 hours  albuterol/ipratropium for Nebulization 3 every 6 hours  dexAMETHasone  Injectable 6 daily  heparin   Injectable 5000 every 12 hours  midodrine 10 every 8 hours  norepinephrine Infusion 0.15 <Continuous>  polyethylene glycol 3350 17 two times a day  senna 2 at bedtime      Vital Signs Last 24 Hrs  T(F): 97.3 (08-23-24 @ 03:00), Max: 98.6 (08-22-24 @ 23:01)    Vital Signs Last 24 Hrs  HR: 76 (08-23-24 @ 07:00) (44 - 124)  BP: 153/65 (08-23-24 @ 07:00) (84/46 - 153/65)  RR: 22 (08-23-24 @ 08:51)  SpO2: 98% (08-23-24 @ 08:51) (89% - 100%)  Wt(kg): --    EXAM:      Labs:                        10.3   14.72 )-----------( 258      ( 23 Aug 2024 05:49 )             31.7     08-23    144  |  112<H>  |  19  ----------------------------<  117<H>  4.7   |  23  |  0.6<L>    Ca    7.6<L>      23 Aug 2024 05:49  Phos  2.8     08-23  Mg     1.9     08-23    TPro  4.3<L>  /  Alb  2.2<L>  /  TBili  0.5  /  DBili  x   /  AST  26  /  ALT  21  /  AlkPhos  84  08-23      WBC Trend:  WBC Count: 14.72 (08-23-24 @ 05:49)  WBC Count: 12.03 (08-22-24 @ 06:43)  WBC Count: 13.83 (08-21-24 @ 06:32)  WBC Count: 15.40 (08-20-24 @ 11:00)      Creatine Trend:  Creatinine: 0.6 (08-23)  Creatinine: 0.6 (08-22)  Creatinine: 0.6 (08-21)  Creatinine: 0.6 (08-20)      Liver Biochemical Testing Trend:  Alanine Aminotransferase (ALT/SGPT): 21 (08-23)  Alanine Aminotransferase (ALT/SGPT): 23 (08-22)  Alanine Aminotransferase (ALT/SGPT): 27 (08-21)  Alanine Aminotransferase (ALT/SGPT): 27 (08-20)  Alanine Aminotransferase (ALT/SGPT): 26 (08-20)  Aspartate Aminotransferase (AST/SGOT): 26 (08-23-24 @ 05:49)  Aspartate Aminotransferase (AST/SGOT): 21 (08-22-24 @ 06:43)  Aspartate Aminotransferase (AST/SGOT): 28 (08-21-24 @ 06:32)  Aspartate Aminotransferase (AST/SGOT): 22 (08-20-24 @ 11:00)  Aspartate Aminotransferase (AST/SGOT): 19 (08-20-24 @ 04:30)  Bilirubin Total: 0.5 (08-23)  Bilirubin Total: 0.4 (08-22)  Bilirubin Total: 0.4 (08-21)  Bilirubin Total: 0.4 (08-20)  Bilirubin Total: 0.4 (08-20)      Trend LDH      Urinalysis Basic - ( 23 Aug 2024 05:49 )    Color: x / Appearance: x / SG: x / pH: x  Gluc: 117 mg/dL / Ketone: x  / Bili: x / Urobili: x   Blood: x / Protein: x / Nitrite: x   Leuk Esterase: x / RBC: x / WBC x   Sq Epi: x / Non Sq Epi: x / Bacteria: x        MICROBIOLOGY:  Vancomycin Level, Random: 33.8 (08-16 @ 06:30)  Vancomycin Level, Random: 12.1 (08-15 @ 10:53)  Vancomycin Level, Trough: 15.9 (08-15 @ 07:00)    MRSA PCR Result.: Negative (08-14-24 @ 19:10)      Urinalysis with Rflx Culture (collected 14 Aug 2024 08:40)    Culture - Blood (collected 14 Aug 2024 08:30)  Source: .Blood Blood-Peripheral  Final Report:    Growth in aerobic bottle: Staphylococcus epidermidis    "Susceptibilities not performed"    Direct identification is available within approximately 3-5    hours either by Blood Panel Multiplexed PCR or Direct    MALDI-TOF. Details: [email or web address]  Organism: Blood Culture PCR  Organism: Blood Culture PCR    Sensitivities:      Method Type: PCR      -  Staphylococcus epidermidis, Methicillin resistant: Detec    Culture - Blood (collected 14 Aug 2024 08:30)  Source: .Blood Blood-Peripheral  Final Report:    Growth in anaerobic bottle: Staphylococcus epidermidis  Organism: Staphylococcus epidermidis  Organism: Staphylococcus epidermidis    Sensitivities:      Method Type: RAPHAEL      -  Clindamycin: S <=0.25      -  Erythromycin: R >4      -  Gentamicin: S <=1 Should not be used as monotherapy      -  Oxacillin: S <=0.25      -  Penicillin: R 1      -  Rifampin: S <=1 Should not be used as monotherapy      -  Tetracycline: S <=1      -  Trimethoprim/Sulfamethoxazole: R >2/38      -  Vancomycin: S 1    Procalcitonin: 0.16 (08-21)    Blood Gas Arterial, Lactate: 1.2 (08-20 @ 10:54)      RADIOLOGY:  imaging below personally reviewed   INFECTIOUS DISEASE FOLLOW UP NOTE:    Interval History/ROS: Patient is a 74y old  Female who presents with a chief complaint of septic shock (23 Aug 2024 08:30)    Overnight events: Stable on NC. Look very comfortable. Opens her eyes briefly.    REVIEW OF SYSTEMS:  Unable to provide due to cognitive impairment      Prior hospital charts reviewed [Yes]  Primary team notes reviewed [Yes]  Other consultant notes reviewed [Yes]    Allergies:  ciprofloxacin (Unknown)  sulfa drugs (Unknown)  erythromycin (Unknown)  penicillin (Unknown)  penicillins (Unknown)      ANTIMICROBIALS:       OTHER MEDS: MEDICATIONS  (STANDING):  acetylcysteine 20%  Inhalation 4 every 6 hours  albuterol/ipratropium for Nebulization 3 every 6 hours  dexAMETHasone  Injectable 6 daily  heparin   Injectable 5000 every 12 hours  midodrine 10 every 8 hours  norepinephrine Infusion 0.15 <Continuous>  polyethylene glycol 3350 17 two times a day  senna 2 at bedtime      Vital Signs Last 24 Hrs  T(F): 97.3 (08-23-24 @ 03:00), Max: 98.6 (08-22-24 @ 23:01)    Vital Signs Last 24 Hrs  HR: 76 (08-23-24 @ 07:00) (44 - 124)  BP: 153/65 (08-23-24 @ 07:00) (84/46 - 153/65)  RR: 22 (08-23-24 @ 08:51)  SpO2: 98% (08-23-24 @ 08:51) (89% - 100%)  Wt(kg): --    EXAM:  GENERAL: NAD, lying in bed. very frail and weak  HEAD: No head lesions  EYES: Conjunctiva pink and cornea white  EAR, NOSE, MOUTH, THROAT: Normal external ears and nose, no discharges; dry mucous membranes; + NC  NECK: Supple, no JVD  RESPIRATORY: Diffuse rhonchi  CARDIOVASCULAR: S1 S2, tachycardia  GASTROINTESTINAL: Soft, nontender, nondistended; normoactive bowel sounds  GENITOURINARY: + trujillo catheter  EXTREMITIES: Severely contracted extremities  NERVOUS SYSTEM: Not responsive, not oriented, opens her eyes briefly  MUSCULOSKELETAL: No joint erythema, swelling  SKIN: Sacral ulcer stage 2, no purulence. no superficial thrombophlebitis  PSYCH: Lethargic    Labs:                        10.3   14.72 )-----------( 258      ( 23 Aug 2024 05:49 )             31.7     08-23    144  |  112<H>  |  19  ----------------------------<  117<H>  4.7   |  23  |  0.6<L>    Ca    7.6<L>      23 Aug 2024 05:49  Phos  2.8     08-23  Mg     1.9     08-23    TPro  4.3<L>  /  Alb  2.2<L>  /  TBili  0.5  /  DBili  x   /  AST  26  /  ALT  21  /  AlkPhos  84  08-23      WBC Trend:  WBC Count: 14.72 (08-23-24 @ 05:49)  WBC Count: 12.03 (08-22-24 @ 06:43)  WBC Count: 13.83 (08-21-24 @ 06:32)  WBC Count: 15.40 (08-20-24 @ 11:00)      Creatine Trend:  Creatinine: 0.6 (08-23)  Creatinine: 0.6 (08-22)  Creatinine: 0.6 (08-21)  Creatinine: 0.6 (08-20)      Liver Biochemical Testing Trend:  Alanine Aminotransferase (ALT/SGPT): 21 (08-23)  Alanine Aminotransferase (ALT/SGPT): 23 (08-22)  Alanine Aminotransferase (ALT/SGPT): 27 (08-21)  Alanine Aminotransferase (ALT/SGPT): 27 (08-20)  Alanine Aminotransferase (ALT/SGPT): 26 (08-20)  Aspartate Aminotransferase (AST/SGOT): 26 (08-23-24 @ 05:49)  Aspartate Aminotransferase (AST/SGOT): 21 (08-22-24 @ 06:43)  Aspartate Aminotransferase (AST/SGOT): 28 (08-21-24 @ 06:32)  Aspartate Aminotransferase (AST/SGOT): 22 (08-20-24 @ 11:00)  Aspartate Aminotransferase (AST/SGOT): 19 (08-20-24 @ 04:30)  Bilirubin Total: 0.5 (08-23)  Bilirubin Total: 0.4 (08-22)  Bilirubin Total: 0.4 (08-21)  Bilirubin Total: 0.4 (08-20)  Bilirubin Total: 0.4 (08-20)      Trend LDH      Urinalysis Basic - ( 23 Aug 2024 05:49 )    Color: x / Appearance: x / SG: x / pH: x  Gluc: 117 mg/dL / Ketone: x  / Bili: x / Urobili: x   Blood: x / Protein: x / Nitrite: x   Leuk Esterase: x / RBC: x / WBC x   Sq Epi: x / Non Sq Epi: x / Bacteria: x        MICROBIOLOGY:  Vancomycin Level, Random: 33.8 (08-16 @ 06:30)  Vancomycin Level, Random: 12.1 (08-15 @ 10:53)  Vancomycin Level, Trough: 15.9 (08-15 @ 07:00)    MRSA PCR Result.: Negative (08-14-24 @ 19:10)      Urinalysis with Rflx Culture (collected 14 Aug 2024 08:40)    Culture - Blood (collected 14 Aug 2024 08:30)  Source: .Blood Blood-Peripheral  Final Report:    Growth in aerobic bottle: Staphylococcus epidermidis    "Susceptibilities not performed"    Direct identification is available within approximately 3-5    hours either by Blood Panel Multiplexed PCR or Direct    MALDI-TOF. Details: [email or web address]  Organism: Blood Culture PCR  Organism: Blood Culture PCR    Sensitivities:      Method Type: PCR      -  Staphylococcus epidermidis, Methicillin resistant: Detec    Culture - Blood (collected 14 Aug 2024 08:30)  Source: .Blood Blood-Peripheral  Final Report:    Growth in anaerobic bottle: Staphylococcus epidermidis  Organism: Staphylococcus epidermidis  Organism: Staphylococcus epidermidis    Sensitivities:      Method Type: RAPHAEL      -  Clindamycin: S <=0.25      -  Erythromycin: R >4      -  Gentamicin: S <=1 Should not be used as monotherapy      -  Oxacillin: S <=0.25      -  Penicillin: R 1      -  Rifampin: S <=1 Should not be used as monotherapy      -  Tetracycline: S <=1      -  Trimethoprim/Sulfamethoxazole: R >2/38      -  Vancomycin: S 1    Procalcitonin: 0.16 (08-21)    Blood Gas Arterial, Lactate: 1.2 (08-20 @ 10:54)      RADIOLOGY:  imaging below personally reviewed    < from: Xray Chest 1 View- PORTABLE-Routine (Xray Chest 1 View- PORTABLE-Routine in AM.) (08.22.24 @ 07:35) >  Impression:    Stable bilateral lung opacities    < end of copied text >

## 2024-08-23 NOTE — PROGRESS NOTE ADULT - SUBJECTIVE AND OBJECTIVE BOX
Patient seen and evaluated this am, comfortable in bed on NC oxygen      T(F): 95.7 (08-23-24 @ 11:00), Max: 98.6 (08-22-24 @ 23:01)  HR: 87 (08-23-24 @ 14:15)  BP: 78/38 (08-23-24 @ 14:15)  RR: 17 (08-23-24 @ 14:15)  SpO2: 100% (08-23-24 @ 14:15) (86% - 100%)    PHYSICAL EXAM:  GENERAL: NAD  HEAD:  Atraumatic, Normocephalic  EYES: EOMI, PERRLA, conjunctiva and sclera clear  NERVOUS SYSTEM:  no focal deficits   CHEST/LUNG:  bilateral rhonchi  HEART: Regular rate and rhythm; No murmurs, rubs, or gallops  ABDOMEN: Soft, Nontender, Nondistended; Bowel sounds present  EXTREMITIES:  2+ Peripheral Pulses, No clubbing, cyanosis, or edema    LABS  08-23    144  |  112<H>  |  19  ----------------------------<  117<H>  4.7   |  23  |  0.6<L>    Ca    7.6<L>      23 Aug 2024 05:49  Phos  2.8     08-23  Mg     1.9     08-23    TPro  4.3<L>  /  Alb  2.2<L>  /  TBili  0.5  /  DBili  x   /  AST  26  /  ALT  21  /  AlkPhos  84  08-23                          10.3   14.72 )-----------( 258      ( 23 Aug 2024 05:49 )             31.7     < from: TTE Echo Complete w/o Contrast w/ Doppler (08.16.24 @ 07:48) >    Summary:   1. Technically limited study.   2. Left ventricular ejection fraction, by visual estimation, is 55 to   60%.   3. Normal left atrial size.   4. Mild mitral valve regurgitation.   5. Structurally normal mitral valve, with normal leaflet excursion.   6. Normal trileaflet aortic valve with normal opening.   7. Sclerotic aortic valve with normal opening.    < end of copied text >      Culture Results:   Growth in aerobic bottle: Staphylococcus epidermidis  "Susceptibilities not performed"  Direct identification is available within approximately 3-5  hours either by Blood Panel Multiplexed PCR or Direct  MALDI-TOF. Details: [email or web address] (08-14-24)  Culture Results:   Growth in anaerobic bottle: Staphylococcus epidermidis (08-14-24)    RADIOLOGY  < from: Xray Chest 1 View- PORTABLE-Routine (Xray Chest 1 View- PORTABLE-Routine in AM.) (08.22.24 @ 07:35) >    Impression:    Stable bilateral lung opacities      < end of copied text >    MEDICATIONS  (STANDING):  acetylcysteine 20%  Inhalation 4 milliLiter(s) Inhalation every 6 hours  albuterol/ipratropium for Nebulization 3 milliLiter(s) Nebulizer every 6 hours  chlorhexidine 2% Cloths 1 Application(s) Topical <User Schedule>  clotrimazole 1% Cream 1 Application(s) Topical every 12 hours  dexAMETHasone  Injectable 6 milliGRAM(s) IV Push daily  heparin   Injectable 5000 Unit(s) SubCutaneous every 12 hours  midodrine 10 milliGRAM(s) Oral every 8 hours  norepinephrine Infusion 0.05 MICROgram(s)/kG/Min (3.4 mL/Hr) IV Continuous <Continuous>  polyethylene glycol 3350 17 Gram(s) Oral two times a day  senna 2 Tablet(s) Oral at bedtime    MEDICATIONS  (PRN):

## 2024-08-23 NOTE — PROGRESS NOTE ADULT - SUBJECTIVE AND OBJECTIVE BOX
Patient is a 74y old  Female who presents with a chief complaint of septic shock (22 Aug 2024 18:23)      Over Night Events:  Patient seen and examined.       ROS:  See HPI    PHYSICAL EXAM    ICU Vital Signs Last 24 Hrs  T(C): 36.3 (23 Aug 2024 03:00), Max: 37 (22 Aug 2024 23:01)  T(F): 97.3 (23 Aug 2024 03:00), Max: 98.6 (22 Aug 2024 23:01)  HR: 76 (23 Aug 2024 07:00) (44 - 124)  BP: 153/65 (23 Aug 2024 07:00) (84/46 - 153/65)  BP(mean): 94 (23 Aug 2024 07:00) (59 - 99)  ABP: --  ABP(mean): --  RR: 24 (23 Aug 2024 07:00) (14 - 41)  SpO2: 100% (23 Aug 2024 07:00) (89% - 100%)    O2 Parameters below as of 23 Aug 2024 07:00  Patient On (Oxygen Delivery Method): BiPAP/CPAP    O2 Concentration (%): 45        General: NAD   HEENT:   MMM             Lymph Nodes: NO cervical LN   Lungs: Bilateral BS  Cardiovascular: Regular, normal S1 s2  Abdomen: Soft, Positive BS  Extremities: No clubbing   Skin: warm, no new rashes   Neurological: at baseline, minimally responsive   Musculoskeletal: does not move, is contracted    I&O's Detail    22 Aug 2024 07:01  -  23 Aug 2024 07:00  --------------------------------------------------------  IN:    Enteral Tube Flush: 100 mL    Enteral Tube Flush: 100 mL    Glucerna: 240 mL    IV PiggyBack: 100 mL    Norepinephrine: 42 mL  Total IN: 582 mL    OUT:    Indwelling Catheter - Urethral (mL): 545 mL  Total OUT: 545 mL    Total NET: 37 mL      23 Aug 2024 07:01  -  23 Aug 2024 08:30  --------------------------------------------------------  IN:  Total IN: 0 mL    OUT:    Indwelling Catheter - Urethral (mL): 0 mL  Total OUT: 0 mL    Total NET: 0 mL          LABS:                          10.3   14.72 )-----------( 258      ( 23 Aug 2024 05:49 )             31.7         23 Aug 2024 05:49    144    |  112    |  19     ----------------------------<  117    4.7     |  23     |  0.6      Ca    7.6        23 Aug 2024 05:49  Phos  2.8       23 Aug 2024 05:49  Mg     1.9       23 Aug 2024 05:49    TPro  4.3    /  Alb  2.2    /  TBili  0.5    /  DBili  x      /  AST  26     /  ALT  21     /  AlkPhos  84     23 Aug 2024 05:49  Amylase x     lipase x                                                                                        Urinalysis Basic - ( 23 Aug 2024 05:49 )    Color: x / Appearance: x / SG: x / pH: x  Gluc: 117 mg/dL / Ketone: x  / Bili: x / Urobili: x   Blood: x / Protein: x / Nitrite: x   Leuk Esterase: x / RBC: x / WBC x   Sq Epi: x / Non Sq Epi: x / Bacteria: x                                                                                                                                                     MEDICATIONS  (STANDING):  acetylcysteine 20%  Inhalation 4 milliLiter(s) Inhalation every 6 hours  albuterol/ipratropium for Nebulization 3 milliLiter(s) Nebulizer every 6 hours  atropine Injectable 0.5 milliGRAM(s) IntraMuscular once  chlorhexidine 2% Cloths 1 Application(s) Topical <User Schedule>  clotrimazole 1% Cream 1 Application(s) Topical every 12 hours  dexAMETHasone  Injectable 6 milliGRAM(s) IV Push daily  heparin   Injectable 5000 Unit(s) SubCutaneous every 12 hours  midodrine 10 milliGRAM(s) Oral every 8 hours  norepinephrine Infusion 0.15 MICROgram(s)/kG/Min (10.2 mL/Hr) IV Continuous <Continuous>  polyethylene glycol 3350 17 Gram(s) Oral two times a day  senna 2 Tablet(s) Oral at bedtime    MEDICATIONS  (PRN):               Patient is a 74y old  Female who presents with a chief complaint of septic shock (22 Aug 2024 18:23)      Over Night Events:  Patient seen and examined.   on NC   off pressors     ROS:  See HPI    PHYSICAL EXAM    ICU Vital Signs Last 24 Hrs  T(C): 36.3 (23 Aug 2024 03:00), Max: 37 (22 Aug 2024 23:01)  T(F): 97.3 (23 Aug 2024 03:00), Max: 98.6 (22 Aug 2024 23:01)  HR: 76 (23 Aug 2024 07:00) (44 - 124)  BP: 153/65 (23 Aug 2024 07:00) (84/46 - 153/65)  BP(mean): 94 (23 Aug 2024 07:00) (59 - 99)  ABP: --  ABP(mean): --  RR: 24 (23 Aug 2024 07:00) (14 - 41)  SpO2: 100% (23 Aug 2024 07:00) (89% - 100%)    O2 Parameters below as of 23 Aug 2024 07:00  Patient On (Oxygen Delivery Method): BiPAP/CPAP    O2 Concentration (%): 45        General: NAD   HEENT:   MMM             Lymph Nodes: NO cervical LN   Lungs: Bilateral BS  Cardiovascular: Regular, normal S1 s2  Abdomen: Soft, Positive BS  Extremities: No clubbing   Skin: warm, no new rashes   Neurological: at baseline, minimally responsive   Musculoskeletal: does not move, is contracted    I&O's Detail    22 Aug 2024 07:01  -  23 Aug 2024 07:00  --------------------------------------------------------  IN:    Enteral Tube Flush: 100 mL    Enteral Tube Flush: 100 mL    Glucerna: 240 mL    IV PiggyBack: 100 mL    Norepinephrine: 42 mL  Total IN: 582 mL    OUT:    Indwelling Catheter - Urethral (mL): 545 mL  Total OUT: 545 mL    Total NET: 37 mL      23 Aug 2024 07:01  -  23 Aug 2024 08:30  --------------------------------------------------------  IN:  Total IN: 0 mL    OUT:    Indwelling Catheter - Urethral (mL): 0 mL  Total OUT: 0 mL    Total NET: 0 mL          LABS:                          10.3   14.72 )-----------( 258      ( 23 Aug 2024 05:49 )             31.7         23 Aug 2024 05:49    144    |  112    |  19     ----------------------------<  117    4.7     |  23     |  0.6      Ca    7.6        23 Aug 2024 05:49  Phos  2.8       23 Aug 2024 05:49  Mg     1.9       23 Aug 2024 05:49    TPro  4.3    /  Alb  2.2    /  TBili  0.5    /  DBili  x      /  AST  26     /  ALT  21     /  AlkPhos  84     23 Aug 2024 05:49  Amylase x     lipase x                                                                                        Urinalysis Basic - ( 23 Aug 2024 05:49 )    Color: x / Appearance: x / SG: x / pH: x  Gluc: 117 mg/dL / Ketone: x  / Bili: x / Urobili: x   Blood: x / Protein: x / Nitrite: x   Leuk Esterase: x / RBC: x / WBC x   Sq Epi: x / Non Sq Epi: x / Bacteria: x                                                                                                                                                     MEDICATIONS  (STANDING):  acetylcysteine 20%  Inhalation 4 milliLiter(s) Inhalation every 6 hours  albuterol/ipratropium for Nebulization 3 milliLiter(s) Nebulizer every 6 hours  atropine Injectable 0.5 milliGRAM(s) IntraMuscular once  chlorhexidine 2% Cloths 1 Application(s) Topical <User Schedule>  clotrimazole 1% Cream 1 Application(s) Topical every 12 hours  dexAMETHasone  Injectable 6 milliGRAM(s) IV Push daily  heparin   Injectable 5000 Unit(s) SubCutaneous every 12 hours  midodrine 10 milliGRAM(s) Oral every 8 hours  norepinephrine Infusion 0.15 MICROgram(s)/kG/Min (10.2 mL/Hr) IV Continuous <Continuous>  polyethylene glycol 3350 17 Gram(s) Oral two times a day  senna 2 Tablet(s) Oral at bedtime    MEDICATIONS  (PRN):

## 2024-08-23 NOTE — PROGRESS NOTE ADULT - NS ATTEST RISK PROBLEM GEN_ALL_CORE FT
The patient's injury acutely impairs one or more vital organ systems, and there is a high probability of imminent or life threatening deterioration in the patient’s condition. The care of this patient requires complex medical decision making in the field of Infectious Diseases and extensive interpretation of laboratory, microbiological, and radiographic data
The patient's injury acutely impairs one or more vital organ systems, and there is a high probability of imminent or life threatening deterioration in the patient’s condition. The care of this patient requires complex medical decision making in the field of Infectious Diseases and extensive interpretation of laboratory, microbiological, and radiographic data

## 2024-08-23 NOTE — CHART NOTE - NSCHARTNOTEFT_GEN_A_CORE
Pt looked comfortable. No family were at bedside at time of visit. I will follow up for support as needed

## 2024-08-23 NOTE — SWALLOW BEDSIDE ASSESSMENT ADULT - SWALLOW EVAL: DIAGNOSIS
Not appropriate for PO intake at this time 2/2 to decreased arousability. Unaware of feeding situation.

## 2024-08-23 NOTE — PROGRESS NOTE ADULT - ASSESSMENT
74F with severe dementia bedbound aox 0 presents by ambulance for increased work of breathing, cough, and decreased responsiveness at home and found with COVID and pneumonia     acute respiratory failure / COVID / possible gram negative pneumonia     - completed 5 days RDV   - complete 10 days of decadron   - complete course of Cefepime as per ID   - titrate pressors to maintain MAP>65   - titrate oxygen down as tolerated   - DVT prophylaxis   - poor prognosis   - may DG to tele   - I discussed plan with pulm/CC   - 50 minutes total spent today on patient care

## 2024-08-23 NOTE — PROGRESS NOTE ADULT - ASSESSMENT
74F with severe dementia bedbound aox0 presents by ambulance for increased work of breathing, cough, and decreased responsiveness at home.    ID is consulted for sepsis  Hypothermia to 94.5, WBC 11.03  Hypoxemia requiring NRB  Lactate 6.8 > 4.1 > 2.4  BCx 8/14 CoNS, likely procurement contamination  UA WBC 2  COVID +    CXR with Bibasilar opacities  CT A/P wo contrast 8/14  Right basilar pleural effusion with adjacent atelectasis/consolidation.   Apparent occlusion of the right lower lobe bronchus causing atelectasis.   Mild left basilar atelectasis/consolidation. Infection is not excluded.   Consider interval follow-up chest CT with IV contrast for further   evaluation.    Antibiotics:  Cefepime 8/14 ->  Vancomycin 8/14      IMPRESSION:  RLL pneumonia, aspiration?  Severe COVID  Acute hypoxemic respiratory failure  Lactic acidosis  Sacral ulcer, no sign of infection  Intertrigo  Constipation  Bedbound  Dementia  Immunosuppression / Immunosenescence secondary to multiple comorbidities which could result in poor clinical outcome      RECOMMENDATIONS:  - Completed 10 days of cefepime; monitor off abx  - Completed 5 days of RDV and 10 days of Decadron  - Continue topical clotrimazole  - Bowel regimen  - Offloading and frequent position changes, aspiration precaution  - Trend WBC, fever curve, transaminases, creatinine daily  - Extremely poor prognosis  - GOC with family      Imani Morrow D.O.  Attending Physician  Division of Infectious Diseases  [facility] - [facility]  Please contact me via [employer] Teams   74F with severe dementia bedbound aox0 presents by ambulance for increased work of breathing, cough, and decreased responsiveness at home.    ID is consulted for sepsis  Hypothermia to 94.5, WBC 11.03  Hypoxemia requiring NRB  Lactate 6.8 > 4.1 > 2.4  BCx 8/14 CoNS, likely procurement contamination  UA WBC 2  COVID +    CXR with Bibasilar opacities  CT A/P wo contrast 8/14  Right basilar pleural effusion with adjacent atelectasis/consolidation.   Apparent occlusion of the right lower lobe bronchus causing atelectasis.   Mild left basilar atelectasis/consolidation. Infection is not excluded.   Consider interval follow-up chest CT with IV contrast for further   evaluation.    Antibiotics:  Cefepime 8/14 ->  Vancomycin 8/14      IMPRESSION:  RLL pneumonia, aspiration?  Severe COVID  Sacral ulcer, no sign of infection  Intertrigo  Constipation  Bedbound  Dementia  Immunosuppression / Immunosenescence secondary to multiple comorbidities which could result in poor clinical outcome      RECOMMENDATIONS:  - Completed 10 days of cefepime; monitor off abx  - Completed 5 days of RDV and 10 days of Decadron  - Continue topical clotrimazole  - Bowel regimen  - Offloading and frequent position changes, aspiration precaution  - Trend WBC, fever curve, transaminases, creatinine daily  - Extremely poor prognosis  - GOC with family      Imani Morrow D.O.  Attending Physician  Division of Infectious Diseases  [facility] - [facility]  Please contact me via [employer] Teams

## 2024-08-23 NOTE — PROGRESS NOTE ADULT - ASSESSMENT
Impression:   Sepsis- stable   Hypoxic respiratory failure- now improved on NC, and NIV at night   Aspiration pneumonia - unchanged on X ray   COVID positive   Bacteremia staph epi contaminant -  severe end stage dementia   DEIRDRE - resolved   Transaminitis resolved   Hypernatremia from dehydration - resovolved  Dysphagia - has NGT  nocturnal Bradycardia - improved with NIV       PLAN:    CNS: AMS, dementia is advanced at baseline.     HEENT: Oral care    PULMONARY:  HOB @ 45 degrees. tolerated NC, BIPAP at night     CARDIOVASCULAR: Goal MAP more than 60mmhg. still on levo,c/w to wean, continue midodrine    GI: GI prophylaxis. NG tube feeds . BMs monitor, and bowel regimen as needed    RENAL:  Follow up lytes.  Correct as needed.      ID: cefepime completed. Decadron to be completed, today, leukocytosis noted likely tue to steroids, monitor for no.     HEMATOLOGICAL:  DVT prophylaxis.     ENDOCRINE:  Follow up FS.  Insulin protocol if needed.    MUSCULOSKELETAL: Bedbound at baseline.    Prognosis is dismal; comfort measures conversation should be revisited.     C Palliative consult     hospice appropriate    The patient is critically ill with a high probability of deterioration.

## 2024-08-24 NOTE — SWALLOW BEDSIDE ASSESSMENT ADULT - ADDITIONAL RECOMMENDATIONS
GOC discussion
ongoing GOC conversation.   SLP to f/u.
te. Rec: GOC discussion w/palliative team re: PEG vs. CMO.
GOC discussion
te. Rec: GOC discussion w/palliative team re: PEG vs. CMO.

## 2024-08-24 NOTE — PROGRESS NOTE ADULT - SUBJECTIVE AND OBJECTIVE BOX
SUBJECTIVE:    Patient is a 74y old Female who presents with a chief complaint of septic shock (24 Aug 2024 06:56)    Currently admitted to medicine with the primary diagnosis of Sepsis       Today is hospital day 10d. This morning she is resting comfortably in bed          PAST MEDICAL & SURGICAL HISTORY  Dementia      SOCIAL HISTORY:    ALLERGIES:  ciprofloxacin (Unknown)  sulfa drugs (Unknown)  erythromycin (Unknown)  penicillin (Unknown)  penicillins (Unknown)    MEDICATIONS:  STANDING MEDICATIONS  acetylcysteine 20%  Inhalation 4 milliLiter(s) Inhalation every 6 hours  albuterol/ipratropium for Nebulization 3 milliLiter(s) Nebulizer every 6 hours  chlorhexidine 2% Cloths 1 Application(s) Topical <User Schedule>  clotrimazole 1% Cream 1 Application(s) Topical every 12 hours  heparin   Injectable 5000 Unit(s) SubCutaneous every 12 hours  midodrine 10 milliGRAM(s) Oral every 8 hours  norepinephrine Infusion 0.05 MICROgram(s)/kG/Min IV Continuous <Continuous>  polyethylene glycol 3350 17 Gram(s) Oral two times a day  senna 2 Tablet(s) Oral at bedtime    PRN MEDICATIONS    VITALS:   T(F): 97.5  HR: 75  BP: 89/51  RR: 18  SpO2: 97%    LABS:  Negative for smoking/alcohol/drug use.                         10.3   14.72 )-----------( 258      ( 23 Aug 2024 05:49 )             31.7     08-23    144  |  112<H>  |  19  ----------------------------<  117<H>  4.7   |  23  |  0.6<L>    Ca    7.6<L>      23 Aug 2024 05:49  Phos  2.8     08-23  Mg     1.9     08-23    TPro  4.3<L>  /  Alb  2.2<L>  /  TBili  0.5  /  DBili  x   /  AST  26  /  ALT  21  /  AlkPhos  84  08-23      Urinalysis Basic - ( 23 Aug 2024 05:49 )    Color: x / Appearance: x / SG: x / pH: x  Gluc: 117 mg/dL / Ketone: x  / Bili: x / Urobili: x   Blood: x / Protein: x / Nitrite: x   Leuk Esterase: x / RBC: x / WBC x   Sq Epi: x / Non Sq Epi: x / Bacteria: x                RADIOLOGY:    PHYSICAL EXAM:  GEN: No acute distress  HEENT: normocephalic, atraumatic, aniceteric  LUNGS: Clear to auscultation bilaterally, no rales/wheezing/ rhonchi  HEART: S1/S2 present. RRR, no murmurs  ABD: Soft, non-tender, non-distended. Bowel sounds present  EXT: warm   NEURO: somnolent       ASSESSMENT AND PLAN:    74F with severe dementia bedbound aox 0 presents by ambulance for increased work of breathing, cough, and decreased responsiveness at home and found with COVID and pneumonia     # Acute respiratory failure w/ hypoxia - improved on 4L NC now   # COVID19   # R/O gram negative pneumonia RLL r/o Aspiration   # H/O Dementia   # H/O Sacral ulcer     PLAN:    - bedbound at baseline   - on 4 L NC   - wean levophed, keep MAP > 65 , cw midodrine   - completed 5 days RDV and 10 days decadron   - Speech and swallow fu appreciated - not appropriate for PO diet , cw NGT feeds   - Reached out to palliative -will follow up   - Wound care eval appreciated-   Clean wound with soap and water, pat dry apply triad with Allevyn foam dressing for autolytic wound debridement   Pressure  injury  preventive  measures    dvt/ gi ppx/diet  dispo: acute     discussed with cc team     very poor prognosis

## 2024-08-24 NOTE — PROGRESS NOTE ADULT - ASSESSMENT
Impression:   Sepsis- stable   Hypoxic respiratory failure- now improved on NC, and NIV at night   Aspiration pneumonia - unchanged on X ray   COVID positive   Bacteremia staph epi contaminant -  severe end stage dementia   DEIRDRE - resolved   Transaminitis resolved   Hypernatremia from dehydration - resovolved  Dysphagia - has NGT  nocturnal Bradycardia - improved with NIV       PLAN:    CNS: AMS, dementia is advanced at baseline.     HEENT: Oral care    PULMONARY:  HOB @ 45 degrees. tolerated NC, BIPAP at night     CARDIOVASCULAR: Goal MAP more than 60mmhg. still on levo,c/w to wean, continue midodrine    GI: GI prophylaxis. NG tube feeds . BMs monitor, and bowel regimen as needed    RENAL:  Follow up lytes.  Correct as needed.      ID: cefepime completed. Decadron to be completed, today, leukocytosis noted likely tue to steroids, monitor for no.     HEMATOLOGICAL:  DVT prophylaxis.     ENDOCRINE:  Follow up FS.  Insulin protocol if needed.    MUSCULOSKELETAL: Bedbound at baseline.    Prognosis is dismal; comfort measures conversation should be revisited.     [facility] Palliative consult     hospice appropriate    The patient is critically ill with a high probability of deterioration.    SDU while on pressors

## 2024-08-24 NOTE — PROGRESS NOTE ADULT - SUBJECTIVE AND OBJECTIVE BOX
Patient is a 74y old  Female who presents with a chief complaint of septic shock (23 Aug 2024 15:06)      Over Night Events:  Patient seen and examined.   on NC   on small dose levo on and off     ROS:  See HPI    PHYSICAL EXAM    ICU Vital Signs Last 24 Hrs  T(C): 36.1 (24 Aug 2024 06:00), Max: 36.1 (23 Aug 2024 20:10)  T(F): 97 (24 Aug 2024 06:00), Max: 97 (23 Aug 2024 20:10)  HR: 71 (24 Aug 2024 05:05) (49 - 126)  BP: 91/67 (24 Aug 2024 05:05) (71/39 - 153/65)  BP(mean): 66 (24 Aug 2024 05:05) (50 - 94)  ABP: --  ABP(mean): --  RR: 18 (24 Aug 2024 05:05) (17 - 37)  SpO2: 100% (24 Aug 2024 05:05) (86% - 100%)    O2 Parameters below as of 24 Aug 2024 04:00  Patient On (Oxygen Delivery Method): nasal cannula  O2 Flow (L/min): 4          General:awake   HEENT:                Lymph Nodes: NO cervical LN   Lungs: Bilateral BS  Cardiovascular: Regular   Abdomen: Soft, Positive BS  Extremities: No clubbing   Skin: warm   Neurological:   Musculoskeletal: move all ext     I&O's Detail    22 Aug 2024 07:01  -  23 Aug 2024 07:00  --------------------------------------------------------  IN:    Enteral Tube Flush: 100 mL    Enteral Tube Flush: 100 mL    Glucerna: 240 mL    IV PiggyBack: 100 mL    Norepinephrine: 42 mL  Total IN: 582 mL    OUT:    Indwelling Catheter - Urethral (mL): 545 mL  Total OUT: 545 mL    Total NET: 37 mL      23 Aug 2024 07:01  -  24 Aug 2024 06:56  --------------------------------------------------------  IN:    Enteral Tube Flush: 100 mL    Glucerna: 480 mL    Lactated Ringers Bolus: 500 mL    Norepinephrine: 60 mL  Total IN: 1140 mL    OUT:    Indwelling Catheter - Urethral (mL): 65 mL  Total OUT: 65 mL    Total NET: 1075 mL          LABS:                          10.3   14.72 )-----------( 258      ( 23 Aug 2024 05:49 )             31.7         23 Aug 2024 05:49    144    |  112    |  19     ----------------------------<  117    4.7     |  23     |  0.6      Ca    7.6        23 Aug 2024 05:49  Phos  2.8       23 Aug 2024 05:49  Mg     1.9       23 Aug 2024 05:49                                                                                      Urinalysis Basic - ( 23 Aug 2024 05:49 )    Color: x / Appearance: x / SG: x / pH: x  Gluc: 117 mg/dL / Ketone: x  / Bili: x / Urobili: x   Blood: x / Protein: x / Nitrite: x   Leuk Esterase: x / RBC: x / WBC x   Sq Epi: x / Non Sq Epi: x / Bacteria: x                                                                                                                                                     MEDICATIONS  (STANDING):  acetylcysteine 20%  Inhalation 4 milliLiter(s) Inhalation every 6 hours  albuterol/ipratropium for Nebulization 3 milliLiter(s) Nebulizer every 6 hours  chlorhexidine 2% Cloths 1 Application(s) Topical <User Schedule>  clotrimazole 1% Cream 1 Application(s) Topical every 12 hours  heparin   Injectable 5000 Unit(s) SubCutaneous every 12 hours  midodrine 10 milliGRAM(s) Oral every 8 hours  norepinephrine Infusion 0.05 MICROgram(s)/kG/Min (3.4 mL/Hr) IV Continuous <Continuous>  polyethylene glycol 3350 17 Gram(s) Oral two times a day  senna 2 Tablet(s) Oral at bedtime    MEDICATIONS  (PRN):          Xrays:  TLC:  OG:  ET tube:                                                                                       ECHO:  [facility] ICU:

## 2024-08-24 NOTE — CHART NOTE - NSCHARTNOTEFT_GEN_A_CORE
2 second pause on telemetry. HR high 30s-40s. ekg ordered showing sinus silvio without prolonged HI interval. asymptomatic when speaking with patient. is awake alert, oriented to name, location, time. patient has amio-> so was d/c-ed.   spoke with nurse and 1:1 sitter to let me know if she feels confused.

## 2024-08-25 NOTE — PROGRESS NOTE ADULT - ASSESSMENT
Impression:   Sepsis- stable   Hypoxic respiratory failure- now improved on NC, and NIV at night   Aspiration pneumonia - unchanged on X ray   COVID positive   Bacteremia staph epi contaminant -  severe end stage dementia   DEIRDRE - resolved   Transaminitis resolved   Hypernatremia from dehydration - resovolved  Dysphagia - has NGT  nocturnal Bradycardia - improved with NIV       PLAN:    CNS: AMS, dementia is advanced at baseline.     HEENT: Oral care    PULMONARY:  HOB @ 45 degrees. tolerated NC, BIPAP at night     CARDIOVASCULAR: Goal MAP more than 60mmhg. still on levo,c/w to wean, continue midodrine    GI: GI prophylaxis. NG tube feeds . BMs monitor, and bowel regimen as needed    RENAL:  Follow up lytes.  Correct as needed.      ID: cefepime completed. off Decadron   increase wbc no fever   check procal for now   follow cbc     HEMATOLOGICAL:  DVT prophylaxis.     ENDOCRINE:  Follow up FS.  Insulin protocol if needed.    MUSCULOSKELETAL: Bedbound at baseline.    Prognosis is dismal; comfort measures conversation should be revisited.     [facility] Palliative consult     hospice appropriate    The patient is critically ill with a high probability of deterioration.  SDU    SDU while on pressors

## 2024-08-25 NOTE — PROGRESS NOTE ADULT - SUBJECTIVE AND OBJECTIVE BOX
SUBJECTIVE:    Patient is a 74y old Female who presents with a chief complaint of septic shock (25 Aug 2024 10:04)    Currently admitted to medicine with the primary diagnosis of Sepsis       Today is hospital day 11d. This morning she is resting comfortably in bed          PAST MEDICAL & SURGICAL HISTORY  Dementia      SOCIAL HISTORY:    ALLERGIES:  ciprofloxacin (Unknown)  sulfa drugs (Unknown)  erythromycin (Unknown)  penicillin (Unknown)  penicillins (Unknown)    MEDICATIONS:  STANDING MEDICATIONS  acetylcysteine 20%  Inhalation 4 milliLiter(s) Inhalation every 6 hours  albuterol/ipratropium for Nebulization 3 milliLiter(s) Nebulizer every 6 hours  baclofen 5 milliGRAM(s) Oral every 8 hours  chlorhexidine 2% Cloths 1 Application(s) Topical <User Schedule>  clotrimazole 1% Cream 1 Application(s) Topical every 12 hours  heparin   Injectable 5000 Unit(s) SubCutaneous every 12 hours  midodrine 15 milliGRAM(s) Oral every 8 hours  norepinephrine Infusion 0.05 MICROgram(s)/kG/Min IV Continuous <Continuous>  polyethylene glycol 3350 17 Gram(s) Oral two times a day  senna 2 Tablet(s) Oral at bedtime    PRN MEDICATIONS    VITALS:   T(F): 96.9  HR: 68  BP: 93/52  RR: 23  SpO2: 98%    LABS:  Negative for smoking/alcohol/drug use.                         9.9    28.41 )-----------( 262      ( 25 Aug 2024 09:40 )             31.3     08-25    145  |  109  |  24<H>  ----------------------------<  122<H>  4.6   |  25  |  0.5<L>    Ca    8.0<L>      25 Aug 2024 09:40  Phos  2.3     08-25  Mg     1.9     08-25    TPro  4.6<L>  /  Alb  2.7<L>  /  TBili  0.5  /  DBili  x   /  AST  31  /  ALT  35  /  AlkPhos  93  08-25      Urinalysis Basic - ( 25 Aug 2024 09:40 )    Color: x / Appearance: x / SG: x / pH: x  Gluc: 122 mg/dL / Ketone: x  / Bili: x / Urobili: x   Blood: x / Protein: x / Nitrite: x   Leuk Esterase: x / RBC: x / WBC x   Sq Epi: x / Non Sq Epi: x / Bacteria: x                RADIOLOGY:    PHYSICAL EXAM:  GEN: No acute distress  HEENT: normocephalic, atraumatic, aniceteric  LUNGS: bl breath sounds  HEART: S1/S2 present. RRR, no murmurs  ABD: Soft, non-tender, non-distended. Bowel sounds present  EXT: warm   NEURO: lethargic       ASSESSMENT AND PLAN:    74F with severe dementia bedbound aox 0 presents by ambulance for increased work of breathing, cough, and decreased responsiveness at home and found with COVID and pneumonia     # Acute respiratory failure w/ hypoxia - improved on 4L NC now   # Worsening leukocytosis   # COVID19   # R/O gram negative pneumonia RLL r/o Aspiration   # H/O Dementia   # H/O Sacral ulcer     PLAN:    - bedbound at baseline   - on 4 L NC   - wean levophed, keep MAP > 65 , cw midodrine   - completed 5 days RDV and 10 days decadron   - Speech and swallow fu appreciated - not appropriate for PO diet , cw NGT feeds   - Reached out to palliative -will follow up   - fu procalcitonin   - Wound care eval appreciated-   Clean wound with soap and water, pat dry apply triad with Allevyn foam dressing for autolytic wound debridement   Pressure  injury  preventive  measures    dvt/ gi ppx/diet  dispo: acute   handoff: palliative fu - reached out to service to follow  ;wean levophed  ; repeat cbc elevated wbc - fu procalcitonin     very poor prognosis     discussed with cc team     very poor prognosis

## 2024-08-25 NOTE — PROGRESS NOTE ADULT - SUBJECTIVE AND OBJECTIVE BOX
Patient is a 74y old  Female who presents with a chief complaint of septic shock (24 Aug 2024 15:28)      Over Night Events:  Patient seen and examined.   on levo small dose   on NC     ROS:  See HPI    PHYSICAL EXAM    ICU Vital Signs Last 24 Hrs  T(C): 36.1 (25 Aug 2024 03:00), Max: 36.1 (25 Aug 2024 03:00)  T(F): 96.9 (25 Aug 2024 03:00), Max: 96.9 (25 Aug 2024 03:00)  HR: 85 (25 Aug 2024 07:15) (52 - 125)  BP: 83/52 (25 Aug 2024 07:15) (63/40 - 123/60)  BP(mean): 63 (25 Aug 2024 07:15) (42 - 86)  ABP: --  ABP(mean): --  RR: 25 (25 Aug 2024 07:15) (18 - 43)  SpO2: 95% (25 Aug 2024 07:15) (79% - 100%)    O2 Parameters below as of 25 Aug 2024 07:15  Patient On (Oxygen Delivery Method): nasal cannula  O2 Flow (L/min): 4          General:awake   HEENT:                Lymph Nodes: NO cervical LN   Lungs: Bilateral BS  Cardiovascular: Regular   Abdomen: Soft, Positive BS  Extremities: No clubbing   Skin: warm   Neurological:  no focal        I&O's Detail    24 Aug 2024 07:01  -  25 Aug 2024 07:00  --------------------------------------------------------  IN:    Enteral Tube Flush: 200 mL    Glucerna: 480 mL    Norepinephrine: 55 mL  Total IN: 735 mL    OUT:    Rectal Tube (mL): 100 mL  Total OUT: 100 mL    Total NET: 635 mL          LABS:                          9.9    28.41 )-----------( 262      ( 25 Aug 2024 09:40 )             31.3                                                                                                                                                                                                                                                MEDICATIONS  (STANDING):  acetylcysteine 20%  Inhalation 4 milliLiter(s) Inhalation every 6 hours  albuterol/ipratropium for Nebulization 3 milliLiter(s) Nebulizer every 6 hours  baclofen 5 milliGRAM(s) Oral every 8 hours  chlorhexidine 2% Cloths 1 Application(s) Topical <User Schedule>  clotrimazole 1% Cream 1 Application(s) Topical every 12 hours  heparin   Injectable 5000 Unit(s) SubCutaneous every 12 hours  midodrine 15 milliGRAM(s) Oral every 8 hours  norepinephrine Infusion 0.05 MICROgram(s)/kG/Min (3.4 mL/Hr) IV Continuous <Continuous>  polyethylene glycol 3350 17 Gram(s) Oral two times a day  senna 2 Tablet(s) Oral at bedtime    MEDICATIONS  (PRN):          Xrays:  TLC:  OG:  ET tube:                                                                                    rotated    ECHO:  [facility] ICU:

## 2024-08-26 NOTE — PROGRESS NOTE ADULT - SUBJECTIVE AND OBJECTIVE BOX
INFECTIOUS DISEASE FOLLOW UP NOTE:    Interval History/ROS: Patient is a 74y old  Female who presents with a chief complaint of septic shock (26 Aug 2024 12:08)    Overnight events: Afebrile, off pressor. Satting well on NC.    REVIEW OF SYSTEMS:  Unable to provide due to cognitive impairment      Prior hospital charts reviewed [Yes]  Primary team notes reviewed [Yes]  Other consultant notes reviewed [Yes]    Allergies:  ciprofloxacin (Unknown)  sulfa drugs (Unknown)  erythromycin (Unknown)  penicillin (Unknown)  penicillins (Unknown)      ANTIMICROBIALS:       OTHER MEDS: MEDICATIONS  (STANDING):  acetylcysteine 20%  Inhalation 4 every 6 hours  albuterol/ipratropium for Nebulization 3 every 6 hours  baclofen 5 every 8 hours  heparin   Injectable 5000 every 12 hours  midodrine 15 every 8 hours      Vital Signs Last 24 Hrs  T(F): 97.4 (08-26-24 @ 15:00), Max: 98.6 (08-22-24 @ 23:01)    Vital Signs Last 24 Hrs  HR: 86 (08-26-24 @ 12:10) (67 - 123)  BP: 95/53 (08-26-24 @ 12:10) (67/46 - 106/64)  RR: 26 (08-26-24 @ 15:00)  SpO2: 93% (08-26-24 @ 12:10) (92% - 100%)  Wt(kg): --    EXAM:  GENERAL: NAD, lying in bed. very frail and weak  HEAD: No head lesions  EYES: Conjunctiva pink and cornea white  EAR, NOSE, MOUTH, THROAT: Normal external ears and nose, no discharges; dry mucous membranes; + NC  NECK: Supple, no JVD  RESPIRATORY: Diffuse rhonchi  CARDIOVASCULAR: S1 S2, tachycardia  GASTROINTESTINAL: Soft, nontender, nondistended; normoactive bowel sounds  GENITOURINARY: no trujillo catheter; + Primafit  EXTREMITIES: Severely contracted extremities  NERVOUS SYSTEM: Not responsive, not oriented, opens her eyes briefly  MUSCULOSKELETAL: No joint erythema, swelling  SKIN: Sacral ulcer stage 2, no purulence. Small subQ hematoma on RUE from pIV  PSYCH: Lethargic    Labs:                        9.8    22.38 )-----------( 218      ( 26 Aug 2024 11:51 )             31.6     08-25    145  |  109  |  24<H>  ----------------------------<  122<H>  4.6   |  25  |  0.5<L>    Ca    8.0<L>      25 Aug 2024 09:40  Phos  2.3     08-25  Mg     1.9     08-25    TPro  4.6<L>  /  Alb  2.7<L>  /  TBili  0.5  /  DBili  x   /  AST  31  /  ALT  35  /  AlkPhos  93  08-25      WBC Trend:  WBC Count: 22.38 (08-26-24 @ 11:51)  WBC Count: 28.41 (08-25-24 @ 09:40)  WBC Count: 14.72 (08-23-24 @ 05:49)  WBC Count: 12.03 (08-22-24 @ 06:43)      Creatine Trend:  Creatinine: 0.5 (08-25)  Creatinine: 0.6 (08-23)  Creatinine: 0.6 (08-22)  Creatinine: 0.6 (08-21)      Liver Biochemical Testing Trend:  Alanine Aminotransferase (ALT/SGPT): 35 (08-25)  Alanine Aminotransferase (ALT/SGPT): 21 (08-23)  Alanine Aminotransferase (ALT/SGPT): 23 (08-22)  Alanine Aminotransferase (ALT/SGPT): 27 (08-21)  Alanine Aminotransferase (ALT/SGPT): 27 (08-20)  Aspartate Aminotransferase (AST/SGOT): 31 (08-25-24 @ 09:40)  Aspartate Aminotransferase (AST/SGOT): 26 (08-23-24 @ 05:49)  Aspartate Aminotransferase (AST/SGOT): 21 (08-22-24 @ 06:43)  Aspartate Aminotransferase (AST/SGOT): 28 (08-21-24 @ 06:32)  Aspartate Aminotransferase (AST/SGOT): 22 (08-20-24 @ 11:00)  Bilirubin Total: 0.5 (08-25)  Bilirubin Total: 0.5 (08-23)  Bilirubin Total: 0.4 (08-22)  Bilirubin Total: 0.4 (08-21)  Bilirubin Total: 0.4 (08-20)      Trend LDH      Urinalysis Basic - ( 25 Aug 2024 09:40 )    Color: x / Appearance: x / SG: x / pH: x  Gluc: 122 mg/dL / Ketone: x  / Bili: x / Urobili: x   Blood: x / Protein: x / Nitrite: x   Leuk Esterase: x / RBC: x / WBC x   Sq Epi: x / Non Sq Epi: x / Bacteria: x        MICROBIOLOGY:  Vancomycin Level, Random: 33.8 (08-16 @ 06:30)  Vancomycin Level, Random: 12.1 (08-15 @ 10:53)  Vancomycin Level, Trough: 15.9 (08-15 @ 07:00)    MRSA PCR Result.: Negative (08-14-24 @ 19:10)      Urinalysis with Rflx Culture (collected 14 Aug 2024 08:40)    Culture - Blood (collected 14 Aug 2024 08:30)  Source: .Blood Blood-Peripheral  Final Report:    Growth in aerobic bottle: Staphylococcus epidermidis    "Susceptibilities not performed"    Direct identification is available within approximately 3-5    hours either by Blood Panel Multiplexed PCR or Direct    MALDI-TOF. Details: [email or web address]  Organism: Blood Culture PCR  Organism: Blood Culture PCR    Sensitivities:      -  Staphylococcus epidermidis, Methicillin resistant: Detec      Method Type: PCR    Culture - Blood (collected 14 Aug 2024 08:30)  Source: .Blood Blood-Peripheral  Final Report:    Growth in anaerobic bottle: Staphylococcus epidermidis  Organism: Staphylococcus epidermidis  Organism: Staphylococcus epidermidis    Sensitivities:      -  Clindamycin: S <=0.25      -  Oxacillin: S <=0.25      -  Gentamicin: S <=1 Should not be used as monotherapy      -  Vancomycin: S 1      -  Tetracycline: S <=1      Method Type: RAPHAEL      -  Penicillin: R 1      -  Rifampin: S <=1 Should not be used as monotherapy      -  Erythromycin: R >4      -  Trimethoprim/Sulfamethoxazole: R >2/38      Procalcitonin: 0.16 (08-21)      RADIOLOGY:  imaging below personally reviewed    < from: Xray Chest 1 View AP/PA (08.25.24 @ 07:27) >  Impression:  Grossly stable bilateral opacities and effusions without definite   evidence of pneumothorax    < end of copied text >

## 2024-08-26 NOTE — PROGRESS NOTE ADULT - ATTENDING COMMENTS
Attending Statement: I have personally performed a face to face diagnostic evaluation on this patient. The patient is suffering from:  Sepsis-   Hypoxic respiratory failure-   Aspiration pneumonia -  COVID positive   Bacteremia staph epi contaminant  severe end stage dementia   DEIRDRE - resolved   Transaminitis resolved   Hypernatremia from dehydration -   Dysphagia   I have made amendments to the documentation where necessary. I have personally seen and examined this patient.  I have fully participated in the care of this patient.  I have reviewed all pertinent clinical information, including history, physical exam, plan and note.
patient seen and examined agree above note   taper pressors for map 60   midodrine    continue abx follow ID   now on NC   aspiration precaution   downgrade to tele
patient seen and examined agree above note   taper pressors for map 60   midodrine    continue abx
patient seen examined and agree above note   speech and swallow eval   taper oxygen to keep pox 92%   NIv as needed
Attending Statement: I have personally performed a face to face diagnostic evaluation on this patient. The patient is suffering from:  Sepsis  Bacteremia staph epi contaminant   severe end stage dementia   DEIRDRE   Transaminitis   Hypernatremia from dehydration   Dysphagia   Aspiration pneumonia   COVID positive   I have made amendments to the documentation where necessary. I have personally seen and examined this patient.  I have fully participated in the care of this patient.  I have reviewed all pertinent clinical information, including history, physical exam, plan and note.
patient seen and examined agree above note   taper pressors for map 60   cardiology eval for bradycardia     midodrine    continue abx
patient seen and examined agree above note   taper pressors for map 60   cardiology eval for bradycardia   lower midodrine for bradycardia   continue abx

## 2024-08-26 NOTE — PROGRESS NOTE ADULT - PROBLEM SELECTOR PROBLEM 4
Advanced care planning/counseling discussion
Advanced care planning/counseling discussion
Encounter for palliative care
Encounter for palliative care

## 2024-08-26 NOTE — PROGRESS NOTE ADULT - SUBJECTIVE AND OBJECTIVE BOX
Patient seen and evaluated this am, laying in bed, tachypneic       T(F): 97.6 (08-26-24 @ 05:00), Max: 98.6 (08-26-24 @ 04:00)  HR: 90 (08-26-24 @ 08:00)  BP: 103/69 (08-26-24 @ 08:00)  RR: 28 (08-26-24 @ 08:00)  SpO2: 100% (08-26-24 @ 08:00) (92% - 100%)    PHYSICAL EXAM:  GENERAL: NAD  HEAD:  Atraumatic, Normocephalic  EYES: EOMI, PERRLA, conjunctiva and sclera clear  NERVOUS SYSTEM: no focal deficits   CHEST/LUNG:  bilateral rales  HEART: Regular rate and rhythm; No murmurs, rubs, or gallops  ABDOMEN: Soft, Nontender, Nondistended; Bowel sounds present  EXTREMITIES:  2+ Peripheral Pulses, No clubbing, cyanosis, or edema    LABS  08-25    145  |  109  |  24<H>  ----------------------------<  122<H>  4.6   |  25  |  0.5<L>    Ca    8.0<L>      25 Aug 2024 09:40  Phos  2.3     08-25  Mg     1.9     08-25    TPro  4.6<L>  /  Alb  2.7<L>  /  TBili  0.5  /  DBili  x   /  AST  31  /  ALT  35  /  AlkPhos  93  08-25                          9.9    28.41 )-----------( 262      ( 25 Aug 2024 09:40 )             31.3       Culture Results:   Growth in aerobic bottle: Staphylococcus epidermidis  "Susceptibilities not performed"  Direct identification is available within approximately 3-5  hours either by Blood Panel Multiplexed PCR or Direct  MALDI-TOF. Details: [email or web address] (08-14-24)  Culture Results:   Growth in anaerobic bottle: Staphylococcus epidermidis (08-14-24)    RADIOLOGY  < from: Xray Chest 1 View AP/PA (08.25.24 @ 07:27) >    Impression:  Grossly stable bilateral opacities and effusions without definite   evidence of pneumothorax    < end of copied text >    MEDICATIONS  (STANDING):  acetylcysteine 20%  Inhalation 4 milliLiter(s) Inhalation every 6 hours  albuterol/ipratropium for Nebulization 3 milliLiter(s) Nebulizer every 6 hours  baclofen 5 milliGRAM(s) Oral every 8 hours  chlorhexidine 2% Cloths 1 Application(s) Topical <User Schedule>  clotrimazole 1% Cream 1 Application(s) Topical every 12 hours  heparin   Injectable 5000 Unit(s) SubCutaneous every 12 hours  midodrine 15 milliGRAM(s) Oral every 8 hours  nystatin Powder 1 Application(s) Topical three times a day    MEDICATIONS  (PRN):

## 2024-08-26 NOTE — PROGRESS NOTE ADULT - ASSESSMENT
73 yo F with severe dementia bedbound aox0 presents by ambulance for increased work of breathing, cough, and decreased responsiveness at home.    Patient on nasal cannula and NGT remains in place.  Spoke with primary team. They will reach out to the family with an update and palliative care will follow up with family after primary team updates them.    - pressors weaned; continue midodrine  - on nasal cannula  - family previously  interested in NGT trial for now, but not PEG per last discussion ; will follow up after primary team updates family  - will follow    [phone] PRN

## 2024-08-26 NOTE — PROGRESS NOTE ADULT - PROBLEM SELECTOR PROBLEM 1
Acute respiratory failure with hypoxia
Septic shock
Septic shock
Acute respiratory failure with hypoxia

## 2024-08-26 NOTE — PROGRESS NOTE ADULT - SUBJECTIVE AND OBJECTIVE BOX
HPI: 73 yo F with severe dementia bedbound aox0 presents by ambulance for increased work of breathing, cough, and decreased responsiveness at home.     INTERVAL EVENTS  -Patient is on nasal cannula and unable to participate in exam    ADVANCE DIRECTIVES:    [ ] Full Code [ x] DNR  MOLST  [ ]  Living Will  [ ]   DECISION MAKER(s):  [ ] Health Care Proxy(s)  [x ] Surrogate(s)  [ ] Guardian           Name(s): Phone Number(s): Children including son Gerardo     BASELINE (I)ADL(s) (prior to admission):  [place]: [ ]Total  [ ] Moderate [ ]Dependent  Palliative Performance Status Version 2:         %    [email or web address]_performance_scale_[email or web address].pdf    Allergies    ciprofloxacin (Unknown)  sulfa drugs (Unknown)  erythromycin (Unknown)  penicillin (Unknown)  penicillins (Unknown)    Intolerances    MEDICATIONS  (STANDING):  acetylcysteine 20%  Inhalation 4 milliLiter(s) Inhalation every 6 hours  albuterol/ipratropium for Nebulization 3 milliLiter(s) Nebulizer every 6 hours  baclofen 5 milliGRAM(s) Oral every 8 hours  chlorhexidine 2% Cloths 1 Application(s) Topical <User Schedule>  clotrimazole 1% Cream 1 Application(s) Topical every 12 hours  heparin   Injectable 5000 Unit(s) SubCutaneous every 12 hours  midodrine 15 milliGRAM(s) Oral every 8 hours  nystatin Powder 1 Application(s) Topical three times a day    MEDICATIONS  (PRN):        PRESENT SYMPTOMS: [x ]Unable to obtain due to poor mentation   Source if other than patient:  [ ]Family   [ ]Team     Pain: [ ]yes [ ]no  QOL impact -   Location -                    Aggravating factors -  Quality -  Radiation -  Timing-  Severity (0-10 scale):  Minimal acceptable level (0-10 scale):     CPOT:  0  https://www.scc.org/getattachment/dxu38l17-3x3r-0b6d-2t7c-9780f0572j6i/Critical-Care-Pain-Observation-Tool-(CPOT)    PAIN AD Score:   http://geriatrictoolkit.[facility]/cog/painad.pdf (press ctrl +  left click to view)    Dyspnea:                           [ ]None[ ]Mild [ ]Moderate [ ]Severe     Respiratory Distress Observation Scale (RDOS): 0  A score of 0 to 2 signifies little or no respiratory distress, 3 signifies mild distress, scores 4 to 6 indicate moderate distress, and scores greater than 7 signify severe distress  https://www.[email or web address]/sites/default/files/PDFS/005621-oinfhaahxsr-eztuzmci-apotjqmrcmt-soruu.pdf    Anxiety:                             [ ]None[ ]Mild [ ]Moderate [ ]Severe   Fatigue:                             [ ]None[ ]Mild [ ]Moderate [ ]Severe   Nausea:                             [ ]None[ ]Mild [ ]Moderate [ ]Severe   Loss of appetite:              [ ]None[ ]Mild [ ]Moderate [ ]Severe   Constipation:                    [ ]None[ ]Mild [ ]Moderate [ ]Severe    Other Symptoms:  [ ]All other review of systems negative     Palliative Performance Status Version 2:        20 %    http://npcrc.org/files/news/palliative_performance_scale_ppsv2.pdf  PHYSICAL EXAM:  Vital Signs Last 24 Hrs  T(C): 36.3 (26 Aug 2024 15:00), Max: 37 (26 Aug 2024 04:00)  T(F): 97.4 (26 Aug 2024 15:00), Max: 98.6 (26 Aug 2024 04:00)  HR: 78 (26 Aug 2024 17:33) (67 - 126)  BP: 130/59 (26 Aug 2024 17:00) (67/46 - 130/59)  BP(mean): 85 (26 Aug 2024 17:00) (52 - 85)  RR: 22 (26 Aug 2024 17:33) (18 - 55)  SpO2: 96% (26 Aug 2024 17:33) (92% - 100%)    Parameters below as of 26 Aug 2024 17:00  Patient On (Oxygen Delivery Method): nasal cannula w/ humidification  O2 Flow (L/min): 4      GENERAL:  [X ] No acute distress [ ]Lethargic  [ ]Unarousable  [ ]Verbal  [ ]Non-Verbal [ ]Cachexia    BEHAVIORAL/PSYCH:  [ ]Alert and Oriented x  [ ] Anxiety [ ] Delirium [ ] Agitation [X ] Calm   EYES: [ ] No scleral icterus [ ] Scleral icterus [ ] Closed  ENMT:  [ ]Dry mouth  [ ]No external oral lesions [ ] No external ear or nose lesions  CARDIOVASCULAR:  [ ]Regular [ ]Irregular [ ]Tachy [ ]Not Tachy  [ ]Justice [ ] Edema [ ] No edema  PULMONARY:  [ ]Tachypnea  [ ]Audible excessive secretions [X ] No labored breathing [ ] labored breathing  GASTROINTESTINAL: [ ]Soft  [ ]Distended  [ X]Not distended [ ]Non tender [ ]Tender  MUSCULOSKELETAL: [ ]No clubbing [ ] clubbing  [ ] No cyanosis [ ] cyanosis  NEUROLOGIC: [ ]No focal deficits  [ ]Follows commands  [x ]Does not follow commands  [x ]Cognitive impairment  [ ]Dysphagia  [ ]Dysarthria  [ ]Paresis   SKIN: [ ] Jaundiced [X ] Non-jaundiced [ ]Rash [ ]No Rash [ ] Warm [ ] Dry  MISC/LINES: [ ] ET tube [ ] Trach [x ]NGT/OGT [ ]PEG [ ]Fischer    LABS: reviewed by me, leukocytosis                                   9.8    22.38 )-----------( 218      ( 26 Aug 2024 11:51 )             31.6       08-25    145  |  109  |  24<H>  ----------------------------<  122<H>  4.6   |  25  |  0.5<L>    Ca    8.0<L>      25 Aug 2024 09:40  Phos  2.3     08-25  Mg     1.9     08-25    TPro  4.6<L>  /  Alb  2.7<L>  /  TBili  0.5  /  DBili  x   /  AST  31  /  ALT  35  /  AlkPhos  93  08-25              Urinalysis Basic - ( 25 Aug 2024 09:40 )    Color: x / Appearance: x / SG: x / pH: x  Gluc: 122 mg/dL / Ketone: x  / Bili: x / Urobili: x   Blood: x / Protein: x / Nitrite: x   Leuk Esterase: x / RBC: x / WBC x   Sq Epi: x / Non Sq Epi: x / Bacteria: x                  CAPILLARY BLOOD GLUCOSE                  RADIOLOGY & ADDITIONAL STUDIES: reviewed by me    < from: Xray Chest 1 View AP/PA (08.25.24 @ 07:27) >  Impression:  Grossly stable bilateral opacities and effusions without definite   evidence of pneumothorax    < end of copied text >    < from: 12 Lead ECG (08.20.24 @ 09:59) >    Ventricular Rate 49 BPM    Atrial Rate 49 BPM    P-R Interval 134 ms    QRS Duration 70 ms    Q-T Interval 550 ms    QTC Calculation(Bazett) 496 ms    P Axis 10 degrees    R Axis 15 degrees    T Axis 221 degrees    Diagnosis Line Sinus bradycardia  Low voltage QRS  T wave abnormality, consider inferior ischemia  Abnormal ECG    < end of copied text >        PROTEIN CALORIE MALNUTRITION PRESENT: [ ]mild [ ]moderate [ ]severe [ ]underweight [ ]morbid obesity  https://www.andeal.org/vault/2440/web/files/ONC/Table_Clinical%20Characteristics%20to%20Document%20Malnutrition-White%20JV%20et%20al%202012.pdf    Height (cm): 147.3 (08-14-24 @ 07:49)  Weight (kg): 36.3 (08-14-24 @ 07:49)  BMI (kg/m2): 16.7 (08-14-24 @ 07:49)    [ ]PPSV2 < or = to 30% [ ]significant weight loss  [ ]poor nutritional intake  [ ]anasarca      [ ]Artificial Nutrition          Palliative Care Spiritual/Emotional Screening Tool Question  Severity (0-4):                    OR                    [X ] Unable to determine/NA  Score of 2 or greater indicates recommendation of Chaplaincy referral  Chaplaincy Referral: [ ] Yes [ ] Refused [ ] Following     Caregiver [place]:  [ ] Yes [ ] No    OR    [x ] Unable to determine. Will assess at later time if appropriate.  Social Work Referral [ ]  Patient and Family Centered Care Referral [ ]    Anticipatory Grief Present: [ ] Yes [ ] No    OR     [ x] Unable to determine. Will assess at later time if appropriate.  Social Work Referral [ ]  Patient and Family Centered Care Referral [ ]    REFERRALS:   [ ]Chaplaincy  [ ]Hospice  [ ]Child Life  [ ]Social Work  [ ]Case management [ ]Holistic Therapy     Palliative care education provided to patient and/or family

## 2024-08-26 NOTE — CHART NOTE - NSCHARTNOTEFT_GEN_A_CORE
My Asthma Action Plan    Name: Barbara Yates   YOB: 1981  Date: 10/16/2023   My doctor: MANJULA Guerrero CNP   My clinic: [facility]        My Control Medicine: Montelukast (Singulair) -  10 mg daily  My Rescue Medicine: Albuterol (Proair/Ventolin/Proventil HFA) 2-4 puffs EVERY 4 HOURS as needed. Use a spacer if recommended by your provider.   My Asthma Severity:   Mild Persistent  Know your asthma triggers: upper respiratory infections, dust mites, pollens, and cold air               GREEN ZONE   Good Control  I feel good  No cough or wheeze  Can work, sleep and play without asthma symptoms       Take your asthma control medicine every day.     If exercise triggers your asthma, take your rescue medication  15 minutes before exercise or sports, and  During exercise if you have asthma symptoms  Spacer to use with inhaler: If you have a spacer, make sure to use it with your inhaler             YELLOW ZONE Getting Worse  I have ANY of these:  I do not feel good  Cough or wheeze  Chest feels tight  Wake up at night   Keep taking your Green Zone medications  Start taking your rescue medicine:  every 20 minutes for up to 1 hour. Then every 4 hours for 24-48 hours.  If you stay in the Yellow Zone for more than 12-24 hours, contact your doctor.  If you do not return to the Green Zone in 12-24 hours or you get worse, start taking your oral steroid medicine if prescribed by your provider.           RED ZONE Medical Alert - Get Help  I have ANY of these:  I feel awful  Medicine is not helping  Breathing getting harder  Trouble walking or talking  Nose opens wide to breathe       Take your rescue medicine NOW  If your provider has prescribed an oral steroid medicine, start taking it NOW  Call your doctor NOW  If you are still in the Red Zone after 20 minutes and you have not reached your doctor:  Take your rescue medicine again and  Call 911 or go to the emergency room right  Pt looked comfortable. Pt is not awake. No family was president at bedside. I will follow up as needed. away    See your regular doctor within 2 weeks of an Emergency Room or Urgent Care visit for follow-up treatment.          Annual Reminders:  Meet with Asthma Educator,  Flu Shot in the Fall, consider Pneumonia Vaccination for patients with asthma (aged 19 and older).    Pharmacy:    [facility]  [id number] [address] AT [facility]    Electronically signed by MANJULA Guerrero CNP   Date: 10/16/23                      Asthma Triggers  How To Control Things That Make Your Asthma Worse    Triggers are things that make your asthma worse.  Look at the list below to help you find your triggers and what you can do about them.  You can help prevent asthma flare-ups by staying away from your triggers.      Trigger                                                          What you can do   Cigarette Smoke  Tobacco smoke can make asthma worse. Do not allow smoking in your home, car or around you.  Be sure no one smokes at a child s day care or school.  If you smoke, ask your health care provider for ways to help you quit.  Ask family members to quit too.  Ask your health care provider for a referral to Quit Plan to help you quit smoking, or call 5-525-942-PLAN.     Colds, Flu, Bronchitis  These are common triggers of asthma. Wash your hands often.  Don t touch your eyes, nose or mouth.  Get a flu shot every year.     Dust Mites  These are tiny bugs that live in cloth or carpet. They are too small to see. Wash sheets and blankets in hot water every week.   Encase pillows and mattress in dust mite proof covers.  Avoid having carpet if you can. If you have carpet, vacuum weekly.   Use a dust mask and HEPA vacuum.   Pollen and Outdoor Mold  Some people are allergic to trees, grass, or weed pollen, or molds. Try to keep your windows closed.  Limit time out doors when pollen count is high.   Ask you health care provider about taking  medicine during allergy season.     Animal Dander  Some people are allergic to skin flakes, urine or saliva from pets with fur or feathers. Keep pets with fur or feathers out of your home.    If you can t keep the pet outdoors, then keep the pet out of your bedroom.  Keep the bedroom door closed.  Keep pets off cloth furniture and away from stuffed toys.     Mice, Rats, and Cockroaches   Some people are allergic to the waste from these pests.   Cover food and garbage.  Clean up spills and food crumbs.  Store grease in the refrigerator.   Keep food out of the bedroom.   Indoor Mold  This can be a trigger if your home has high moisture. Fix leaking faucets, pipes, or other sources of water.   Clean moldy surfaces.  Dehumidify basement if it is damp and smelly.   Smoke, Strong Odors, and Sprays  These can reduce air quality. Stay away from strong odors and sprays, such as perfume, powder, hair spray, paints, smoke incense, paint, cleaning products, candles and new carpet.   Exercise or Sports  Some people with asthma have this trigger. Be active!  Ask your doctor about taking medicine before sports or exercise to prevent symptoms.    Warm up for 5-10 minutes before and after sports or exercise.     Other Triggers of Asthma  Cold air:  Cover your nose and mouth with a scarf.  Sometimes laughing or crying can be a trigger.  Some medicines and food can trigger asthma.

## 2024-08-26 NOTE — PROGRESS NOTE ADULT - ASSESSMENT
74F with severe dementia bedbound aox0 presents by ambulance for increased work of breathing, cough, and decreased responsiveness at home.    ID is consulted for sepsis  Hypothermia to 94.5, WBC 11.03  Hypoxemia requiring NRB  Lactate 6.8 > 4.1 > 2.4  BCx 8/14 CoNS, likely procurement contamination  UA WBC 2  COVID +    CXR with Bibasilar opacities  CT A/P wo contrast 8/14  Right basilar pleural effusion with adjacent atelectasis/consolidation.   Apparent occlusion of the right lower lobe bronchus causing atelectasis.   Mild left basilar atelectasis/consolidation. Infection is not excluded.   Consider interval follow-up chest CT with IV contrast for further   evaluation.    Antibiotics:  Cefepime 8/14 - 8/23  Vancomycin 8/14      IMPRESSION:  RUE subQ hematoma  Severe COVID  Sacral ulcer, no sign of infection  Intertrigo  Constipation  Bedbound  Dementia  Immunosuppression / Immunosenescence secondary to multiple comorbidities which could result in poor clinical outcome      RECOMMENDATIONS:  - Leukocytosis could be 2/2 RUE hematoma vs microaspiration? Patient is having difficulty clearing oral secretion  - Monitor off abx  - Continue topical clotrimazole  - Bowel regimen  - Offloading and frequent position changes, aspiration precaution  - Trend WBC, fever curve, transaminases, creatinine daily  - Extremely poor prognosis  - GOC with family      Imani Morrow D.O.  Attending Physician  Division of Infectious Diseases  [facility]  Please contact me via Microsoft Teams

## 2024-08-26 NOTE — PROGRESS NOTE ADULT - ASSESSMENT
Impression:     Sepsis- stable   Hypoxic respiratory failure- now improved on NC  Aspiration pneumonia - unchanged on X ray   COVID positive   Bacteremia staph epi contaminant  severe end stage dementia   DEIRDRE - resolved   Transaminitis resolved   Hypernatremia from dehydration - resolved   Dysphagia - has NGT  nocturnal Bradycardia - improved with NIV       PLAN:    CNS: AMS, dementia is advanced at baseline.     HEENT: Oral care    PULMONARY:  HOB @ 45 degrees. tolerated NC, wean off o2, aspiration precautions, CXR, chest PT, saline nebs    CARDIOVASCULAR: Goal MAP more than 60mmhg. off pressors, decrease midodrine 10mg q8hrs    GI: GI prophylaxis. NG tube feeds . BMs monitor, and bowel regimen as needed    RENAL:  Follow up lytes.  Correct as needed.      ID: cefepime completed. off Decadron   increase wbc no fever   procal pending  follow cbc     HEMATOLOGICAL:  DVT prophylaxis.     ENDOCRINE:  Follow up FS.  Insulin protocol if needed.    MUSCULOSKELETAL: Bedbound at baseline.    Prognosis is dismal; comfort measures conversation should be revisited.     [facility] Palliative consult     hospice appropriate    The patient is critically ill with a high probability of deterioration.  SDU  DGTF in PM

## 2024-08-26 NOTE — PROGRESS NOTE ADULT - PROBLEM SELECTOR PROBLEM 3
Advanced care planning/counseling discussion
Dysphagia
Dysphagia
Advanced care planning/counseling discussion

## 2024-08-26 NOTE — PROGRESS NOTE ADULT - ASSESSMENT
74F with severe dementia bedbound aox 0 presents by ambulance for increased work of breathing, cough, and decreased responsiveness at home and found with COVID and pneumonia     acute respiratory failure / COVID / diarrhea     - DC senna and Miralax   - check repeat wbc today   - completed 5 days RDV and  10 days of decadron   - completed course of Cefepime as per ID   - titrate pressors to maintain MAP>65   - titrate oxygen down as tolerated   - DVT prophylaxis   - poor prognosis   - I discussed plan with pulm/CC   - 50 minutes total spent today on patient care

## 2024-08-27 NOTE — PROGRESS NOTE ADULT - SUBJECTIVE AND OBJECTIVE BOX
Patient seen and evaluated this, non verbal, still with loose BM's, now on NIPPV      T(F): 98.4 (08-27-24 @ 15:01), Max: 98.4 (08-27-24 @ 15:01)  HR: 98 (08-27-24 @ 15:34)  BP: 108/73 (08-27-24 @ 15:34)  RR: 23 (08-27-24 @ 15:34)  SpO2: 97% (08-27-24 @ 15:34) (97% - 100%)    PHYSICAL EXAM:  GENERAL: NAD  HEAD:  Atraumatic, Normocephalic  EYES: EOMI, PERRLA, conjunctiva and sclera clear  NERVOUS SYSTEM:   no focal deficits   CHEST/LUNG:  bilateral rhonchi  HEART: Regular rate and rhythm; No murmurs, rubs, or gallops  ABDOMEN: Soft, Nontender, Nondistended; Bowel sounds present  EXTREMITIES:  2+ Peripheral Pulses, No clubbing, cyanosis, or edema    LABS  08-27    138  |  103  |  23<H>  ----------------------------<  93  5.2<H>   |  27  |  0.5<L>    Ca    8.1<L>      27 Aug 2024 05:45  Mg     1.8     08-27    TPro  4.7<L>  /  Alb  2.7<L>  /  TBili  0.5  /  DBili  x   /  AST  27  /  ALT  39  /  AlkPhos  100  08-27                          9.3    15.96 )-----------( 190      ( 27 Aug 2024 05:45 )             29.6     < from: TTE Echo Complete w/o Contrast w/ Doppler (08.16.24 @ 07:48) >  Summary:   1. Technically limited study.   2. Left ventricular ejection fraction, by visual estimation, is 55 to   60%.   3. Normal left atrial size.   4. Mild mitral valve regurgitation.   5. Structurally normal mitral valve, with normal leaflet excursion.   6. Normal trileaflet aortic valve with normal opening.   7. Sclerotic aortic valve with normal opening.      < end of copied text >      Culture Results:   Growth in aerobic bottle: Staphylococcus epidermidis  "Susceptibilities not performed"  Direct identification is available within approximately 3-5  hours either by Blood Panel Multiplexed PCR or Direct  MALDI-TOF. Details: [email or web address] (08-14-24)  Culture Results:   Growth in anaerobic bottle: Staphylococcus epidermidis (08-14-24)    RADIOLOGY  < from: Xray Chest 1 View-PORTABLE IMMEDIATE (Xray Chest 1 View-PORTABLE IMMEDIATE .) (08.27.24 @ 12:58) >  Impression:    Increased right lung opacities.    < end of copied text >    MEDICATIONS  (STANDING):  acetylcysteine 20%  Inhalation 4 milliLiter(s) Inhalation every 6 hours  albuterol/ipratropium for Nebulization 3 milliLiter(s) Nebulizer every 6 hours  baclofen 5 milliGRAM(s) Oral every 8 hours  chlorhexidine 2% Cloths 1 Application(s) Topical <User Schedule>  clotrimazole 1% Cream 1 Application(s) Topical every 12 hours  heparin   Injectable 5000 Unit(s) SubCutaneous every 12 hours  midodrine 15 milliGRAM(s) Oral every 8 hours  nystatin Powder 1 Application(s) Topical three times a day    MEDICATIONS  (PRN):

## 2024-08-27 NOTE — PROGRESS NOTE ADULT - ASSESSMENT
74F with severe dementia bedbound aox0 presents by ambulance for increased work of breathing, cough, and decreased responsiveness at home.    ID is consulted for sepsis  Hypothermia to 94.5, WBC 11.03  Hypoxemia requiring NRB  Lactate 6.8 > 4.1 > 2.4  BCx 8/14 CoNS, likely procurement contamination  UA WBC 2  COVID +    CXR with Bibasilar opacities  CT A/P wo contrast 8/14  Right basilar pleural effusion with adjacent atelectasis/consolidation.   Apparent occlusion of the right lower lobe bronchus causing atelectasis.   Mild left basilar atelectasis/consolidation. Infection is not excluded.   Consider interval follow-up chest CT with IV contrast for further   evaluation.    Antibiotics:  Cefepime 8/14 - 8/23  Vancomycin 8/14      IMPRESSION:  RUE subQ hematoma  Severe COVID  Sacral ulcer, no sign of infection  Intertrigo  Constipation  Bedbound  Dementia  Immunosuppression / Immunosenescence secondary to multiple comorbidities which could result in poor clinical outcome      RECOMMENDATIONS:  - Leukocytosis could be 2/2 RUE hematoma vs microaspiration? Patient is having difficulty clearing oral secretion  - Monitor off abx  - Continue topical clotrimazole  - Bowel regimen  - Offloading and frequent position changes, aspiration precaution; patient is at high risk of recurrent aspiration  - Trend WBC, fever curve, transaminases, creatinine daily  - Extremely poor prognosis  - GOC with family  - Will sign off. Please recall ID PRN for further questions      Imani Morrow D.O.  Attending Physician  Division of Infectious Diseases  [facility]  Please contact me via [employer] Teams

## 2024-08-27 NOTE — CHART NOTE - NSCHARTNOTEFT_GEN_A_CORE
Palliative care chart note - patient not seen    Spoke with primary team.  They spoke with the patient's son over the phone and he noted that he wouldn't want at PEG, but wants to take the patient home and trialing oral feeding at home.     Called and spoke with son Gerardo over the phone. Palliative care reintroduced. He confirmed that he and his siblings wouldn't want at PEG, but wants to take the patient home and trialing oral feeding at home. He noted that if she doesn't improve, they would likely pursue hospice. She has been on hospice before with [facility], and is not interested in an additional hospice consult.    We talked about code status and DNI again. He noted that he and his siblings likely wouldn't want the patient intubated again, but wants to speak further with them. All questions answered.    Will follow    [phone] PRN

## 2024-08-27 NOTE — PROGRESS NOTE ADULT - ASSESSMENT
74F with severe dementia bedbound aox 0 presents by ambulance for increased work of breathing, cough, and decreased responsiveness at home and found with COVID and pneumonia     acute respiratory failure / COVID / diarrhea     - senna and Miralax stopped   - wbc today improved    - completed 5 days RDV and  10 days of decadron   - completed course of Cefepime as per ID   - midodrine   - NPO while on NIPPV   - very poor prognosis    - DVT prophylaxis

## 2024-08-27 NOTE — PROGRESS NOTE ADULT - TIME BILLING
- Patient has an illness which poses a threat to life or bodily function without treatment  - I independently interpreted the most recent microbiological data CXR  - I discussed my recommendations with the primary team housestaff / Attending   - I assisted in the decision regarding continued need for hospitalization or escalation of hospital level of care
I have personally seen and examined this patient.    I have reviewed all pertinent clinical information and reviewed all relevant imaging and diagnostic studies personally.   I discussed recommendations with the primary team.
I have personally seen and examined this patient.    I have reviewed all pertinent clinical information and reviewed all relevant imaging and diagnostic studies personally.   I discussed recommendations with the primary team.
- Patient has an illness which poses a threat to life or bodily function without treatment  - I independently interpreted the most recent microbiological data CXR  - I discussed my recommendations with the primary team housestaff / Attending   - I assisted in the decision regarding continued need for hospitalization or escalation of hospital level of care
I have personally seen and examined this patient.    I have reviewed all pertinent clinical information and reviewed all relevant imaging and diagnostic studies personally.   I discussed recommendations with the primary team.
I have personally seen and examined this patient.    I have reviewed all pertinent clinical information and reviewed all relevant imaging and diagnostic studies personally.   I discussed recommendations with the primary team.
Time spent on total encounter assessing patient, examination, chart review, counseling and coordinating care by the attending physician/nurse/care manager as well as GOC discussion.
I have personally seen and examined this patient.    I have reviewed all pertinent clinical information and reviewed all relevant imaging and diagnostic studies personally.   I discussed recommendations with the primary team.

## 2024-08-27 NOTE — PROGRESS NOTE ADULT - SUBJECTIVE AND OBJECTIVE BOX
INFECTIOUS DISEASE FOLLOW UP NOTE:    Interval History/ROS: Patient is a 74y old  Female who presents with a chief complaint of septic shock (26 Aug 2024 16:46)      Overnight events:    REVIEW OF SYSTEMS:        Prior hospital charts reviewed [Yes]  Primary team notes reviewed [Yes]  Other consultant notes reviewed [Yes]    Allergies:  ciprofloxacin (Unknown)  sulfa drugs (Unknown)  erythromycin (Unknown)  penicillin (Unknown)  penicillins (Unknown)      ANTIMICROBIALS:       OTHER MEDS: MEDICATIONS  (STANDING):  acetylcysteine 20%  Inhalation 4 every 6 hours  albuterol/ipratropium for Nebulization 3 every 6 hours  baclofen 5 every 8 hours  heparin   Injectable 5000 every 12 hours  midodrine 15 every 8 hours      Vital Signs Last 24 Hrs  T(F): 98 (08-27-24 @ 07:00), Max: 98.8 (08-26-24 @ 19:40)    Vital Signs Last 24 Hrs  HR: 99 (08-27-24 @ 06:00) (71 - 126)  BP: 160/74 (08-27-24 @ 06:00) (79/40 - 160/74)  RR: 25 (08-27-24 @ 06:00)  SpO2: 100% (08-27-24 @ 06:00) (92% - 100%)  Wt(kg): --    EXAM:      Labs:                        9.3    15.96 )-----------( 190      ( 27 Aug 2024 05:45 )             29.6     08-27    138  |  103  |  23<H>  ----------------------------<  93  5.2<H>   |  27  |  0.5<L>    Ca    8.1<L>      27 Aug 2024 05:45  Phos  2.3     08-25  Mg     1.8     08-27    TPro  4.7<L>  /  Alb  2.7<L>  /  TBili  0.5  /  DBili  x   /  AST  27  /  ALT  39  /  AlkPhos  100  08-27      WBC Trend:  WBC Count: 15.96 (08-27-24 @ 05:45)  WBC Count: 22.38 (08-26-24 @ 11:51)  WBC Count: 28.41 (08-25-24 @ 09:40)  WBC Count: 14.72 (08-23-24 @ 05:49)      Creatine Trend:  Creatinine: 0.5 (08-27)  Creatinine: 0.5 (08-25)  Creatinine: 0.6 (08-23)  Creatinine: 0.6 (08-22)      Liver Biochemical Testing Trend:  Alanine Aminotransferase (ALT/SGPT): 39 (08-27)  Alanine Aminotransferase (ALT/SGPT): 35 (08-25)  Alanine Aminotransferase (ALT/SGPT): 21 (08-23)  Alanine Aminotransferase (ALT/SGPT): 23 (08-22)  Alanine Aminotransferase (ALT/SGPT): 27 (08-21)  Aspartate Aminotransferase (AST/SGOT): 27 (08-27-24 @ 05:45)  Aspartate Aminotransferase (AST/SGOT): 31 (08-25-24 @ 09:40)  Aspartate Aminotransferase (AST/SGOT): 26 (08-23-24 @ 05:49)  Aspartate Aminotransferase (AST/SGOT): 21 (08-22-24 @ 06:43)  Aspartate Aminotransferase (AST/SGOT): 28 (08-21-24 @ 06:32)  Bilirubin Total: 0.5 (08-27)  Bilirubin Total: 0.5 (08-25)  Bilirubin Total: 0.5 (08-23)  Bilirubin Total: 0.4 (08-22)  Bilirubin Total: 0.4 (08-21)      Trend LDH      Urinalysis Basic - ( 27 Aug 2024 05:45 )    Color: x / Appearance: x / SG: x / pH: x  Gluc: 93 mg/dL / Ketone: x  / Bili: x / Urobili: x   Blood: x / Protein: x / Nitrite: x   Leuk Esterase: x / RBC: x / WBC x   Sq Epi: x / Non Sq Epi: x / Bacteria: x        MICROBIOLOGY:  Vancomycin Level, Random: 33.8 (08-16 @ 06:30)  Vancomycin Level, Random: 12.1 (08-15 @ 10:53)  Vancomycin Level, Trough: 15.9 (08-15 @ 07:00)    MRSA PCR Result.: Negative (08-14-24 @ 19:10)      Urinalysis with Rflx Culture (collected 14 Aug 2024 08:40)    Culture - Blood (collected 14 Aug 2024 08:30)  Source: .Blood Blood-Peripheral  Final Report:    Growth in aerobic bottle: Staphylococcus epidermidis    "Susceptibilities not performed"    Direct identification is available within approximately 3-5    hours either by Blood Panel Multiplexed PCR or Direct    MALDI-TOF. Details: [email or web address]  Organism: Blood Culture PCR  Organism: Blood Culture PCR    Sensitivities:      -  Staphylococcus epidermidis, Methicillin resistant: Detec      Method Type: PCR    Culture - Blood (collected 14 Aug 2024 08:30)  Source: .Blood Blood-Peripheral  Final Report:    Growth in anaerobic bottle: Staphylococcus epidermidis  Organism: Staphylococcus epidermidis  Organism: Staphylococcus epidermidis    Sensitivities:      -  Clindamycin: S <=0.25      -  Oxacillin: S <=0.25      -  Gentamicin: S <=1 Should not be used as monotherapy      -  Vancomycin: S 1      -  Tetracycline: S <=1      Method Type: RAPHAEL      -  Penicillin: R 1      -  Rifampin: S <=1 Should not be used as monotherapy      -  Erythromycin: R >4      -  Trimethoprim/Sulfamethoxazole: R >2/38                              Procalcitonin: 0.08 (08-26)  Procalcitonin: 0.16 (08-21)                    RADIOLOGY:  imaging below personally reviewed   INFECTIOUS DISEASE FOLLOW UP NOTE:    Interval History/ROS: Patient is a 74y old  Female who presents with a chief complaint of septic shock (26 Aug 2024 16:46)    Overnight events: No event overnight. Stable on NC. Being downgraded to floor    REVIEW OF SYSTEMS:  Unable to provide due to cognitive impairment      Prior hospital charts reviewed [Yes]  Primary team notes reviewed [Yes]  Other consultant notes reviewed [Yes]    Allergies:  ciprofloxacin (Unknown)  sulfa drugs (Unknown)  erythromycin (Unknown)  penicillin (Unknown)  penicillins (Unknown)      ANTIMICROBIALS:       OTHER MEDS: MEDICATIONS  (STANDING):  acetylcysteine 20%  Inhalation 4 every 6 hours  albuterol/ipratropium for Nebulization 3 every 6 hours  baclofen 5 every 8 hours  heparin   Injectable 5000 every 12 hours  midodrine 15 every 8 hours      Vital Signs Last 24 Hrs  T(F): 98 (08-27-24 @ 07:00), Max: 98.8 (08-26-24 @ 19:40)    Vital Signs Last 24 Hrs  HR: 99 (08-27-24 @ 06:00) (71 - 126)  BP: 160/74 (08-27-24 @ 06:00) (79/40 - 160/74)  RR: 25 (08-27-24 @ 06:00)  SpO2: 100% (08-27-24 @ 06:00) (92% - 100%)  Wt(kg): --    EXAM:  GENERAL: NAD, lying in bed. very frail and weak  HEAD: No head lesions  EYES: Conjunctiva pink and cornea white  EAR, NOSE, MOUTH, THROAT: Normal external ears and nose, no discharges; dry mucous membranes; + NC  NECK: Supple, no JVD  RESPIRATORY: Diffuse rhonchi  CARDIOVASCULAR: S1 S2, tachycardia  GASTROINTESTINAL: Soft, nontender, nondistended; normoactive bowel sounds  GENITOURINARY: no trujillo catheter; + Primafit  EXTREMITIES: Severely contracted extremities  NERVOUS SYSTEM: Not responsive, not oriented, opens her eyes briefly  MUSCULOSKELETAL: No joint erythema, swelling  SKIN: Sacral ulcer stage 2, no purulence. Small subQ hematoma on RUE from pIV  PSYCH: Lethargic      Labs:                        9.3    15.96 )-----------( 190      ( 27 Aug 2024 05:45 )             29.6     08-27    138  |  103  |  23<H>  ----------------------------<  93  5.2<H>   |  27  |  0.5<L>    Ca    8.1<L>      27 Aug 2024 05:45  Phos  2.3     08-25  Mg     1.8     08-27    TPro  4.7<L>  /  Alb  2.7<L>  /  TBili  0.5  /  DBili  x   /  AST  27  /  ALT  39  /  AlkPhos  100  08-27      WBC Trend:  WBC Count: 15.96 (08-27-24 @ 05:45)  WBC Count: 22.38 (08-26-24 @ 11:51)  WBC Count: 28.41 (08-25-24 @ 09:40)  WBC Count: 14.72 (08-23-24 @ 05:49)      Creatine Trend:  Creatinine: 0.5 (08-27)  Creatinine: 0.5 (08-25)  Creatinine: 0.6 (08-23)  Creatinine: 0.6 (08-22)      Liver Biochemical Testing Trend:  Alanine Aminotransferase (ALT/SGPT): 39 (08-27)  Alanine Aminotransferase (ALT/SGPT): 35 (08-25)  Alanine Aminotransferase (ALT/SGPT): 21 (08-23)  Alanine Aminotransferase (ALT/SGPT): 23 (08-22)  Alanine Aminotransferase (ALT/SGPT): 27 (08-21)  Aspartate Aminotransferase (AST/SGOT): 27 (08-27-24 @ 05:45)  Aspartate Aminotransferase (AST/SGOT): 31 (08-25-24 @ 09:40)  Aspartate Aminotransferase (AST/SGOT): 26 (08-23-24 @ 05:49)  Aspartate Aminotransferase (AST/SGOT): 21 (08-22-24 @ 06:43)  Aspartate Aminotransferase (AST/SGOT): 28 (08-21-24 @ 06:32)  Bilirubin Total: 0.5 (08-27)  Bilirubin Total: 0.5 (08-25)  Bilirubin Total: 0.5 (08-23)  Bilirubin Total: 0.4 (08-22)  Bilirubin Total: 0.4 (08-21)      Trend LDH      Urinalysis Basic - ( 27 Aug 2024 05:45 )    Color: x / Appearance: x / SG: x / pH: x  Gluc: 93 mg/dL / Ketone: x  / Bili: x / Urobili: x   Blood: x / Protein: x / Nitrite: x   Leuk Esterase: x / RBC: x / WBC x   Sq Epi: x / Non Sq Epi: x / Bacteria: x        MICROBIOLOGY:  Vancomycin Level, Random: 33.8 (08-16 @ 06:30)  Vancomycin Level, Random: 12.1 (08-15 @ 10:53)  Vancomycin Level, Trough: 15.9 (08-15 @ 07:00)    MRSA PCR Result.: Negative (08-14-24 @ 19:10)      Urinalysis with Rflx Culture (collected 14 Aug 2024 08:40)    Culture - Blood (collected 14 Aug 2024 08:30)  Source: .Blood Blood-Peripheral  Final Report:    Growth in aerobic bottle: Staphylococcus epidermidis    "Susceptibilities not performed"    Direct identification is available within approximately 3-5    hours either by Blood Panel Multiplexed PCR or Direct    MALDI-TOF. Details: [email or web address]  Organism: Blood Culture PCR  Organism: Blood Culture PCR    Sensitivities:      -  Staphylococcus epidermidis, Methicillin resistant: Detec      Method Type: PCR    Culture - Blood (collected 14 Aug 2024 08:30)  Source: .Blood Blood-Peripheral  Final Report:    Growth in anaerobic bottle: Staphylococcus epidermidis  Organism: Staphylococcus epidermidis  Organism: Staphylococcus epidermidis    Sensitivities:      -  Clindamycin: S <=0.25      -  Oxacillin: S <=0.25      -  Gentamicin: S <=1 Should not be used as monotherapy      -  Vancomycin: S 1      -  Tetracycline: S <=1      Method Type: RAPHAEL      -  Penicillin: R 1      -  Rifampin: S <=1 Should not be used as monotherapy      -  Erythromycin: R >4      -  Trimethoprim/Sulfamethoxazole: R >2/38      Procalcitonin: 0.08 (08-26)  Procalcitonin: 0.16 (08-21)        RADIOLOGY:  imaging below personally reviewed    < from: Xray Chest 1 View AP/PA (08.25.24 @ 07:27) >  Impression:  Grossly stable bilateral opacities and effusions without definite   evidence of pneumothorax    < end of copied text >

## 2024-08-28 NOTE — PROGRESS NOTE ADULT - SUBJECTIVE AND OBJECTIVE BOX
--------------------------------------------------------------------------------    24 hour events/subjective:  NAD ,  Re- consult and f/u skin assessment           ROS: pt unable to offer    ALLERGIES & MEDICATIONS  --------------------------------------------------------------------------------  Allergies    ciprofloxacin (Unknown)  sulfa drugs (Unknown)  erythromycin (Unknown)  penicillin (Unknown)  penicillins (Unknown)    Intolerances          STANDING INPATIENT MEDICATIONS    acetylcysteine 20%  Inhalation 4 milliLiter(s) Inhalation every 6 hours  albuterol/ipratropium for Nebulization 3 milliLiter(s) Nebulizer every 6 hours  chlorhexidine 2% Cloths 1 Application(s) Topical <User Schedule>  clotrimazole 1% Cream 1 Application(s) Topical every 12 hours  heparin   Injectable 5000 Unit(s) SubCutaneous every 12 hours  midodrine 15 milliGRAM(s) Oral every 8 hours  nystatin Powder 1 Application(s) Topical three times a day      PRN INPATIENT MEDICATION        VITALS/PHYSICAL EXAM-   --------------------------------------------------------------------------------  T(C): 36.8 (08-28-24 @ 05:19), Max: 36.9 (08-27-24 @ 15:01)  HR: 113 (08-27-24 @ 21:02) (93 - 113)  BP: 105/68 (08-28-24 @ 05:19) (97/53 - 108/73)  RR: 18 (08-28-24 @ 05:19) (18 - 24)  SpO2: 98% (08-28-24 @ 05:19) (95% - 98%)  Wt(kg): --        08-28-24 @ 07:01  -  08-28-24 @ 14:04  --------------------------------------------------------  IN: 590 mL / OUT: 0 mL / NET: 590 mL            LABS/ CULTURES/ RADIOLOGY:              10.3   11.50 >-----------<  237      [08-28-24 @ 06:00]              32.0     136  |  97  |  22  ----------------------------<  94      [08-28-24 @ 06:00]  5.1   |  27  |  0.5        Ca     8.3     [08-28-24 @ 06:00]      Mg     1.9     [08-28-24 @ 06:00]    TPro  5.3  /  Alb  3.0  /  TBili  0.7  /  DBili  x   /  AST  32  /  ALT  42  /  AlkPhos  107  [08-28-24 @ 06:00]              CAPILLARY BLOOD GLUCOSE

## 2024-08-28 NOTE — PROGRESS NOTE ADULT - ASSESSMENT
74F with severe dementia bedbound aox 0 presents by ambulance for increased work of breathing, cough, and decreased responsiveness at home and found with COVID and pneumonia     acute respiratory failure / COVID / diarrhea     - senna and Miralax stopped   - wbc today improved    - completed 5 days RDV and  10 days of decadron   - completed course of Cefepime as per ID   - midodrine   - cxr worsening   - very poor prognosis    - DVT prophylaxis

## 2024-08-28 NOTE — PROGRESS NOTE ADULT - SUBJECTIVE AND OBJECTIVE BOX
Patient seen and evaluated this am, on high flow oxygen      T(F): 98.3 (08-28-24 @ 05:19), Max: 98.4 (08-27-24 @ 15:01)  HR: 113 (08-27-24 @ 21:02)  BP: 105/68 (08-28-24 @ 05:19)  RR: 18 (08-28-24 @ 05:19)  SpO2: 98% (08-28-24 @ 05:19) (95% - 98%)    PHYSICAL EXAM:  GENERAL: NAD  HEAD:  Atraumatic, Normocephalic  EYES: EOMI, PERRLA, conjunctiva and sclera clear  NERVOUS SYSTEM:   no focal deficits   CHEST/LUNG:  bilateral rhonchi  HEART: Regular rate and rhythm; No murmurs, rubs, or gallops  ABDOMEN: Soft, Nontender, Nondistended; Bowel sounds present  EXTREMITIES:  2+ Peripheral Pulses, No clubbing, cyanosis, or edema    LABS  08-28    136  |  97<L>  |  22<H>  ----------------------------<  94  5.1<H>   |  27  |  0.5<L>    Ca    8.3<L>      28 Aug 2024 06:00  Mg     1.9     08-28    TPro  5.3<L>  /  Alb  3.0<L>  /  TBili  0.7  /  DBili  x   /  AST  32  /  ALT  42<H>  /  AlkPhos  107  08-28                          10.3   11.50 )-----------( 237      ( 28 Aug 2024 06:00 )             32.0       Culture Results:   Growth in aerobic bottle: Staphylococcus epidermidis  "Susceptibilities not performed"  Direct identification is available within approximately 3-5  hours either by Blood Panel Multiplexed PCR or Direct  MALDI-TOF. Details: [email or web address] (08-14-24)  Culture Results:   Growth in anaerobic bottle: Staphylococcus epidermidis (08-14-24)    RADIOLOGY  < from: Xray Chest 1 View-PORTABLE IMMEDIATE (Xray Chest 1 View-PORTABLE IMMEDIATE .) (08.27.24 @ 12:58) >  Impression:    Increased right lung opacities.    < end of copied text >    MEDICATIONS  (STANDING):  acetylcysteine 20%  Inhalation 4 milliLiter(s) Inhalation every 6 hours  albuterol/ipratropium for Nebulization 3 milliLiter(s) Nebulizer every 6 hours  chlorhexidine 2% Cloths 1 Application(s) Topical <User Schedule>  clotrimazole 1% Cream 1 Application(s) Topical every 12 hours  heparin   Injectable 5000 Unit(s) SubCutaneous every 12 hours  midodrine 15 milliGRAM(s) Oral every 8 hours  nystatin Powder 1 Application(s) Topical three times a day    MEDICATIONS  (PRN):

## 2024-08-28 NOTE — PROGRESS NOTE ADULT - ASSESSMENT
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 )  Size: ~3x2  Tissue Description Yellow devitalised skin tissue , macerated   No odor, erythema, increased warmth, tenderness, induration, fluctuance, nor crepitus :   Wound Exudate : Scant with dressing removal    Wound Edge: Irregular   Periwound Condition : Macerated            Wound and skin care recs  Continue current treatment    Clean wound with soap and water, pat dry apply triad with Allevyn foam dressing for autolytic wound debridement   Pressure  injury  preventive  measures  Skin  and incontinence care   Assess wound and inform primary provider of any changes   Case discussed with primary Rn  Wound/ ostomy specialist  to f/u as needed     Offloading: [ x] Frequent position changes [x ] Devices/Equipment  Cleansing: [ ] Saline [ ] Soap/Water [ ] Other: ______  Topicals: [ ] Barrier Cream [ ] Antimicrobial [x ] autolytic  Wound Debridement [x] Moist  wound Healing   Dressings: [ ] Dry, sterile [ ] Allevyn  Foam [ ] Absorbant Pads [ ] Collagenase    Other Recs.   Per Primary team   Total time for bedside assessment  , review of medical records  and  discussion of plan of care with primary team greater than 35 min

## 2024-08-28 NOTE — PROGRESS NOTE ADULT - NUTRITIONAL ASSESSMENT
Diet:  Diet, NPO with Tube Feed:   Tube Feeding Modality: Nasogastric  Glucerna 1.2 Bob (GLUCERNARTH)  Total Volume for 24 Hours (mL): 960  Bolus  Total Volume of Bolus (mL):  240  Total # of Feeds: 4  Tube Feed Frequency: Every 6 hours   Tube Feed Start Time: 12:00  Bolus Feed Rate (mL per Hour): 120   Bolus Feed Duration (in Hours): 2  Free Water Flush  Bolus   Total Volume per Flush (mL): 200   Frequency: Every 6 Hours  Free Water Flush Instructions:  50 ml pre and post feed (08-26-24 @ 10:57)

## 2024-08-29 NOTE — PROGRESS NOTE ADULT - SUBJECTIVE AND OBJECTIVE BOX
Patient seen and evaluated this am, remains on high flow oxygen 45l/min      T(F): 96.8 (08-29-24 @ 13:51), Max: 98.4 (08-29-24 @ 05:25)  HR: 122 (08-29-24 @ 13:51)  BP: 177/77 (08-29-24 @ 13:51)  RR: 16 (08-29-24 @ 13:51)  SpO2: 93% (08-29-24 @ 13:51) (93% - 98%)    PHYSICAL EXAM:  GENERAL: NAD  HEAD:  Atraumatic, Normocephalic  EYES: EOMI, PERRLA, conjunctiva and sclera clear  NERVOUS SYSTEM:  no focal deficits   CHEST/LUNG:  bilateral rales  HEART: Regular rate and rhythm; No murmurs, rubs, or gallops  ABDOMEN: Soft, Nontender, Nondistended; Bowel sounds present  EXTREMITIES:  2+ Peripheral Pulses, No clubbing, cyanosis, or edema    LABS  08-29    136  |  98  |  20  ----------------------------<  94  5.1<H>   |  24  |  0.5<L>    Ca    8.4      29 Aug 2024 06:13  Mg     2.0     08-29    TPro  5.2<L>  /  Alb  2.8<L>  /  TBili  0.7  /  DBili  x   /  AST  38  /  ALT  43<H>  /  AlkPhos  114  08-29                          9.6    11.23 )-----------( 205      ( 29 Aug 2024 06:13 )             29.6       Culture Results:   Growth in aerobic bottle: Staphylococcus epidermidis  "Susceptibilities not performed"  Direct identification is available within approximately 3-5  hours either by Blood Panel Multiplexed PCR or Direct  MALDI-TOF. Details: [email or web address] (08-14-24)  Culture Results:   Growth in anaerobic bottle: Staphylococcus epidermidis (08-14-24)    RADIOLOGY  < from: Xray Chest 1 View- PORTABLE-Routine (Xray Chest 1 View- PORTABLE-Routine in AM.) (08.29.24 @ 06:53) >  Impression:    Increased bilateral opacities/effusions compared to prior.    < end of copied text >    MEDICATIONS  (STANDING):  acetylcysteine 20%  Inhalation 4 milliLiter(s) Inhalation every 6 hours  albuterol/ipratropium for Nebulization 3 milliLiter(s) Nebulizer every 6 hours  chlorhexidine 2% Cloths 1 Application(s) Topical <User Schedule>  clotrimazole 1% Cream 1 Application(s) Topical every 12 hours  heparin   Injectable 5000 Unit(s) SubCutaneous every 12 hours  midodrine 15 milliGRAM(s) Oral every 8 hours  nystatin Powder 1 Application(s) Topical three times a day    MEDICATIONS  (PRN):

## 2024-08-29 NOTE — PROGRESS NOTE ADULT - ASSESSMENT
74F with severe dementia bedbound aox 0 presents by ambulance for increased work of breathing, cough, and decreased responsiveness at home and found with COVID and pneumonia     acute respiratory failure / COVID / diarrhea     - senna and Miralax stopped   - wbc today improved    - completed 5 days RDV and  10 days of decadron   - completed course of Cefepime as per ID   - midodrine   - cxr worsening   - will try one dose of Lasix today   - very poor prognosis    - DVT prophylaxis

## 2024-08-30 NOTE — PROGRESS NOTE ADULT - ASSESSMENT
74F with severe dementia bedbound aox 0 presents by ambulance for increased work of breathing, cough, and decreased responsiveness at home and found with COVID and pneumonia     acute respiratory failure / COVID / diarrhea     - decrease midodrine dose to 5mg q8h   - wbc  improved    - completed 5 days RDV and  10 days of decadron   - completed course of Cefepime as per ID   - cxr worse - maintain even fluid balance   - received  Lasix yesterday   - titrate oxygen down as tolerated - maintain pox>90%   - very poor prognosis    - DVT prophylaxis

## 2024-08-30 NOTE — CHART NOTE - NSCHARTNOTEFT_GEN_A_CORE
Pt is on hi-flow, looks comfortable. No family were present at bedside. I will follow up for support.

## 2024-08-30 NOTE — PROGRESS NOTE ADULT - SUBJECTIVE AND OBJECTIVE BOX
Patient is a 74y old  Female who presents with a chief complaint of septic shock (29 Aug 2024 14:24)      T(F): 98.4 (08-30-24 @ 05:38), Max: 100 (08-29-24 @ 20:48)  HR: 80 (08-30-24 @ 05:38)  BP: 124/77 (08-30-24 @ 05:38)  RR: 18 (08-29-24 @ 20:48)  SpO2: 100% (08-29-24 @ 23:23) (93% - 100%)    PHYSICAL EXAM:  GENERAL: NAD  HEAD:  Atraumatic, Normocephalic  EYES: EOMI, PERRLA, conjunctiva and sclera clear  NERVOUS SYSTEM:  Alert & Oriented X3, no focal deficits   CHEST/LUNG: Clear to percussion bilaterally; No rales, rhonchi, wheezing, or rubs  HEART: Regular rate and rhythm; No murmurs, rubs, or gallops  ABDOMEN: Soft, Nontender, Nondistended; Bowel sounds present  EXTREMITIES:  2+ Peripheral Pulses, No clubbing, cyanosis, or edema    LABS  08-30    137  |  94<L>  |  22<H>  ----------------------------<  99  4.7   |  29  |  0.5<L>    Ca    8.4      30 Aug 2024 05:50  Mg     2.1     08-30    TPro  5.5<L>  /  Alb  3.1<L>  /  TBili  0.6  /  DBili  x   /  AST  29  /  ALT  40  /  AlkPhos  121<H>  08-30                          10.2   8.49  )-----------( 235      ( 30 Aug 2024 05:50 )             30.9       Culture Results:   Growth in aerobic bottle: Staphylococcus epidermidis  "Susceptibilities not performed"  Direct identification is available within approximately 3-5  hours either by Blood Panel Multiplexed PCR or Direct  MALDI-TOF. Details: [email or web address] (08-14-24)  Culture Results:   Growth in anaerobic bottle: Staphylococcus epidermidis (08-14-24)    RADIOLOGY  < from: Xray Chest 1 View- PORTABLE-Routine (Xray Chest 1 View- PORTABLE-Routine in AM.) (08.29.24 @ 06:53) >    Impression:    Increased bilateral opacities/effusions compared to prior.      < end of copied text >    MEDICATIONS  (STANDING):  acetylcysteine 20%  Inhalation 4 milliLiter(s) Inhalation every 6 hours  albuterol/ipratropium for Nebulization 3 milliLiter(s) Nebulizer every 6 hours  chlorhexidine 2% Cloths 1 Application(s) Topical <User Schedule>  clotrimazole 1% Cream 1 Application(s) Topical every 12 hours  heparin   Injectable 5000 Unit(s) SubCutaneous every 12 hours  midodrine 15 milliGRAM(s) Oral every 8 hours  nystatin Powder 1 Application(s) Topical three times a day    MEDICATIONS  (PRN):      Patient seen and evaluated this am, remains on high flow oxygen @45l/min      T(F): 98.4 (08-30-24 @ 05:38), Max: 100 (08-29-24 @ 20:48)  HR: 80 (08-30-24 @ 05:38)  BP: 124/77 (08-30-24 @ 05:38)  RR: 18 (08-29-24 @ 20:48)  SpO2: 100% (08-29-24 @ 23:23) (93% - 100%)    PHYSICAL EXAM:  GENERAL: NAD  HEAD:  Atraumatic, Normocephalic  EYES: EOMI, PERRLA, conjunctiva and sclera clear  NERVOUS SYSTEM:  no focal deficits   CHEST/LUNG:  bilateral rhonchi  HEART: Regular rate and rhythm; No murmurs, rubs, or gallops  ABDOMEN: Soft, Nontender, Nondistended; Bowel sounds present  EXTREMITIES:  2+ Peripheral Pulses, No clubbing, cyanosis, or edema    LABS  08-30    137  |  94<L>  |  22<H>  ----------------------------<  99  4.7   |  29  |  0.5<L>    Ca    8.4      30 Aug 2024 05:50  Mg     2.1     08-30    TPro  5.5<L>  /  Alb  3.1<L>  /  TBili  0.6  /  DBili  x   /  AST  29  /  ALT  40  /  AlkPhos  121<H>  08-30                          10.2   8.49  )-----------( 235      ( 30 Aug 2024 05:50 )             30.9       Culture Results:   Growth in aerobic bottle: Staphylococcus epidermidis  "Susceptibilities not performed"  Direct identification is available within approximately 3-5  hours either by Blood Panel Multiplexed PCR or Direct  MALDI-TOF. Details: [email or web address] (08-14-24)  Culture Results:   Growth in anaerobic bottle: Staphylococcus epidermidis (08-14-24)    RADIOLOGY  < from: Xray Chest 1 View- PORTABLE-Routine (Xray Chest 1 View- PORTABLE-Routine in AM.) (08.29.24 @ 06:53) >    Impression:    Increased bilateral opacities/effusions compared to prior.      < end of copied text >    MEDICATIONS  (STANDING):  acetylcysteine 20%  Inhalation 4 milliLiter(s) Inhalation every 6 hours  albuterol/ipratropium for Nebulization 3 milliLiter(s) Nebulizer every 6 hours  chlorhexidine 2% Cloths 1 Application(s) Topical <User Schedule>  clotrimazole 1% Cream 1 Application(s) Topical every 12 hours  heparin   Injectable 5000 Unit(s) SubCutaneous every 12 hours  midodrine 15 milliGRAM(s) Oral every 8 hours  nystatin Powder 1 Application(s) Topical three times a day    MEDICATIONS  (PRN):

## 2024-08-31 NOTE — PROGRESS NOTE ADULT - SUBJECTIVE AND OBJECTIVE BOX
PRISCILLA HERNANDEZ 74y Female  MRN#: [id number]   Hospital Day: 17d    SUBJECTIVE  Patient is a 74y old Female who presents with a chief complaint of septic shock (30 Aug 2024 10:38)  Currently admitted to medicine with the primary diagnosis of Sepsis      INTERVAL HPI AND OVERNIGHT EVENTS:  Patient was examined and seen at bedside. This morning she is resting comfortably in bed and reports no issues or overnight events.  denies chest pain , sob, n/v/d/c, hematuria or bloody stool.     REVIEW OF SYMPTOMS:  10 point ROS negative except as above.    OBJECTIVE  PAST MEDICAL & SURGICAL HISTORY  Dementia      ALLERGIES:  ciprofloxacin (Unknown)  sulfa drugs (Unknown)  erythromycin (Unknown)  penicillin (Unknown)  penicillins (Unknown)    MEDICATIONS:  STANDING MEDICATIONS  acetylcysteine 20%  Inhalation 4 milliLiter(s) Inhalation every 6 hours  albuterol/ipratropium for Nebulization 3 milliLiter(s) Nebulizer every 6 hours  chlorhexidine 2% Cloths 1 Application(s) Topical <User Schedule>  clotrimazole 1% Cream 1 Application(s) Topical every 12 hours  heparin   Injectable 5000 Unit(s) SubCutaneous every 12 hours  midodrine 5 milliGRAM(s) Oral every 8 hours  nystatin Powder 1 Application(s) Topical three times a day    PRN MEDICATIONS    VITAL SIGNS: Last 24 Hours  T(C): 37.2 (31 Aug 2024 05:30), Max: 37.2 (30 Aug 2024 15:00)  T(F): 99 (31 Aug 2024 05:30), Max: 99 (30 Aug 2024 15:00)  HR: 105 (31 Aug 2024 05:30) (91 - 121)  BP: 122/70 (31 Aug 2024 05:30) (100/60 - 136/72)  BP(mean): --  RR: 18 (31 Aug 2024 05:30) (18 - 18)  SpO2: 100% (31 Aug 2024 05:30) (96% - 100%)    PHYSICAL EXAM:  GENERAL: NAD,   HEENT : NC/AT,   NECK: Supple, No JVD  CHEST/LUNG: Clear to auscultation bilaterally;   HEART: Regular rate and rhythm;   ABDOMEN: Soft, Nontender, Nondistended; Bowel sounds present  EXTREMITIES:  no edema  NEURO:  nonverbal, contracted, bedbound   SKIN: No rashes or lesions    LABS:                        10.2   8.49  )-----------( 235      ( 30 Aug 2024 05:50 )             30.9     08-30    137  |  94<L>  |  22<H>  ----------------------------<  99  4.7   |  29  |  0.5<L>    Ca    8.4      30 Aug 2024 05:50  Mg     2.1     08-30    TPro  5.5<L>  /  Alb  3.1<L>  /  TBili  0.6  /  DBili  x   /  AST  29  /  ALT  40  /  AlkPhos  121<H>  08-30      Urinalysis Basic - ( 30 Aug 2024 05:50 )    Color: x / Appearance: x / SG: x / pH: x  Gluc: 99 mg/dL / Ketone: x  / Bili: x / Urobili: x   Blood: x / Protein: x / Nitrite: x   Leuk Esterase: x / RBC: x / WBC x   Sq Epi: x / Non Sq Epi: x / Bacteria: x      RADIOLOGY:  < from: Xray Chest 1 View- PORTABLE-Routine (Xray Chest 1 View- PORTABLE-Routine in AM.) (08.29.24 @ 06:53) >    Impression:    Increased bilateral opacities/effusions compared to prior.      < end of copied text >    MEDICATIONS  (STANDING):  acetylcysteine 20%  Inhalation 4 milliLiter(s) Inhalation every 6 hours  albuterol/ipratropium for Nebulization 3 milliLiter(s) Nebulizer every 6 hours  chlorhexidine 2% Cloths 1 Application(s) Topical <User Schedule>  clotrimazole 1% Cream 1 Application(s) Topical every 12 hours  heparin   Injectable 5000 Unit(s) SubCutaneous every 12 hours  midodrine 15 milliGRAM(s) Oral every 8 hours  nystatin Powder 1 Application(s) Topical three times a day    MEDICATIONS  (PRN):

## 2024-08-31 NOTE — PROGRESS NOTE ADULT - ASSESSMENT
acute respiratory failure 2/2 covid  Severe COVID  diarrhea  severe dementia bedbound  UE subQ hematoma  Sacral ulcer, no sign of infection  Intertrigo / Constipation  Immunosuppression      a 74 F with severe dementia bedbound aox 0 presents by ambulance for increased work of breathing, cough, and decreased responsiveness at home and found with COVID and pneumonia.   - cxr worse - maintain even fluid balance   - completed 5 days RDV and  10 days of decadron   - completed course of Cefepime as per ID  - titrate oxygen down as tolerated - maintain pox>90%   - wbc  improved     plan:  - Monitor off abx  - Continue topical clotrimazole  - decrease midodrine dose to 5mg   - Continue Iv fluids as needed  - consulted ID, pall care Rec's appr.   - PT/rehab,   - Tylenol and pain management   - continue current medication, antihypertensive (with holding parameters),  - monitor vitals closely, daily am labs  - precaution (fall/aspiration/seizure)      DVT prophylaxis: SQH   GI prophylaxis: Protonix   diet: HH diet   code status: full code    #Progress Note Handoff  Pending (specify):  pending improvement   Disposition: pending [facility]      poor prognosis

## 2024-09-01 NOTE — PROGRESS NOTE ADULT - ASSESSMENT
acute respiratory failure 2/2 covid  Severe COVID  diarrhea  severe dementia bedbound  UE subQ hematoma  Sacral ulcer, no sign of infection  Intertrigo / Constipation  Immunosuppression      a 74 F with severe dementia bedbound aox 0 presents by ambulance for increased work of breathing, cough, and decreased responsiveness at home and found with COVID and pneumonia.   - cxr worse - maintain even fluid balance   - completed 5 days RDV and  10 days of decadron   - completed course of Cefepime as per ID  - titrate oxygen down as tolerated - maintain pox>90%   - wbc  improved     plan:  - Monitor off abx  - Continue topical clotrimazole  - decrease midodrine dose to 5mg   - disContinue Iv fluids as needed  - consulted ID, pall care Rec's appr.   - PT/rehab,   - Tylenol and pain management   - continue current medication, antihypertensive (with holding parameters),  - monitor vitals closely, daily am labs  - precaution (fall/aspiration/seizure)      DVT prophylaxis: SQH   GI prophylaxis: Protonix   diet: HH diet   code status: full code    #Progress Note Handoff  Pending (specify):  pending improvement on hiflow, NGT and rectal tube   Disposition: pending [facility]      poor prognosis         acute respiratory failure 2/2 covid  Severe COVID  diarrhea  severe dementia bedbound  UE subQ hematoma  Sacral ulcer, no sign of infection  Intertrigo / Constipation  Immunosuppression      a 74 F with severe dementia bedbound aox 0 presents by ambulance for increased work of breathing, cough, and decreased responsiveness at home and found with COVID and pneumonia.   - cxr worse - maintain even fluid balance   - completed 5 days RDV and  10 days of decadron   - completed course of Cefepime as per ID  - titrate oxygen down as tolerated - maintain pox>90%   - wbc  improved     plan:  - Monitor off abx  - Continue topical clotrimazole  - decrease midodrine dose to 5mg   - disContinue Iv fluids as needed  - consulted ID, pall care Rec's appr.   - PT/rehab,   - Tylenol and pain management   - continue current medication, antihypertensive (with holding parameters),  - monitor vitals closely, daily am labs  - precaution (fall/aspiration/seizure)      DVT prophylaxis: SQH   GI prophylaxis: Protonix   diet: NGT feeds  code status: DNR/ intubate     #Progress Note Handoff  Pending (specify):  pending improvement on Hi-Flow, NGT and rectal tube   Disposition: pending [facility]      poor prognosis   as per pall care :  patient's son over the phone and he noted that he wouldn't want at PEG, but wants to take the patient home and trialing oral feeding at home.

## 2024-09-01 NOTE — PROGRESS NOTE ADULT - SUBJECTIVE AND OBJECTIVE BOX
PRISCILLA HERNANDEZ 74y Female  MRN#: [id number]   Hospital Day: 17d    SUBJECTIVE  Patient is a 74y old Female who presents with a chief complaint of septic shock (30 Aug 2024 10:38)  Currently admitted to medicine with the primary diagnosis of Sepsis      INTERVAL HPI AND OVERNIGHT EVENTS:  Patient was examined and seen at bedside. This morning she is resting comfortably in bed and reports no issues or overnight events.  nonverbal, contracted, bedbound , with high flow , NGT and rectal tube.  Resp for hi flow titration     REVIEW OF SYMPTOMS:  limited given mental status     OBJECTIVE  PAST MEDICAL & SURGICAL HISTORY  Dementia      ALLERGIES:  ciprofloxacin (Unknown)  sulfa drugs (Unknown)  erythromycin (Unknown)  penicillin (Unknown)  penicillins (Unknown)    MEDICATIONS:  STANDING MEDICATIONS  acetylcysteine 20%  Inhalation 4 milliLiter(s) Inhalation every 6 hours  albuterol/ipratropium for Nebulization 3 milliLiter(s) Nebulizer every 6 hours  chlorhexidine 2% Cloths 1 Application(s) Topical <User Schedule>  clotrimazole 1% Cream 1 Application(s) Topical every 12 hours  heparin   Injectable 5000 Unit(s) SubCutaneous every 12 hours  midodrine 5 milliGRAM(s) Oral every 8 hours  nystatin Powder 1 Application(s) Topical three times a day    PRN MEDICATIONS    VITAL SIGNS: Last 24 Hours  Vital Signs Last 24 Hrs  T(C): 37.1 (01 Sep 2024 04:49), Max: 37.9 (31 Aug 2024 14:30)  T(F): 98.7 (01 Sep 2024 04:49), Max: 100.2 (31 Aug 2024 14:30)  HR: 109 (01 Sep 2024 04:49) (107 - 112)  BP: 104/66 (01 Sep 2024 04:49) (104/66 - 146/79)  BP(mean): --  RR: 18 (01 Sep 2024 04:49) (18 - 18)  SpO2: 100% (01 Sep 2024 04:49) (97% - 100%)    Parameters below as of 31 Aug 2024 19:11  Patient On (Oxygen Delivery Method): nasal cannula, high flow  O2 Flow (L/min): 40  O2 Concentration (%): 40    PHYSICAL EXAM:  GENERAL: NAD,   HEENT : NC/AT, NGT  CHEST/LUNG: Clear to auscultation bilaterally;   HEART: Regular rate and rhythm;   ABDOMEN: Soft, Nontender, Nondistended; Bowel sounds present  EXTREMITIES:  no edema  NEURO:  nonverbal, contracted, bedbound   SKIN: No rashes or lesions  rectal tube - brown     LABS:                                9.3    6.16  )-----------( 247      ( 01 Sep 2024 08:36 )             28.3     09-01    140  |  102  |  17  ----------------------------<  96  4.5   |  25  |  0.5<L>    Ca    8.5      01 Sep 2024 08:36  Mg     2.1     09-01    TPro  5.1<L>  /  Alb  2.7<L>  /  TBili  0.6  /  DBili  x   /  AST  23  /  ALT  31  /  AlkPhos  97  09-01      Urinalysis Basic - ( 01 Sep 2024 08:36 )    Color: x / Appearance: x / SG: x / pH: x  Gluc: 96 mg/dL / Ketone: x  / Bili: x / Urobili: x   Blood: x / Protein: x / Nitrite: x   Leuk Esterase: x / RBC: x / WBC x   Sq Epi: x / Non Sq Epi: x / Bacteria:     RADIOLOGY:  < from: Xray Chest 1 View- PORTABLE-Routine (Xray Chest 1 View- PORTABLE-Routine in AM.) (08.29.24 @ 06:53) >    Impression:    Increased bilateral opacities/effusions compared to prior.      < end of copied text >    MEDICATIONS  (STANDING):  acetylcysteine 20%  Inhalation 4 milliLiter(s) Inhalation every 6 hours  albuterol/ipratropium for Nebulization 3 milliLiter(s) Nebulizer every 6 hours  chlorhexidine 2% Cloths 1 Application(s) Topical <User Schedule>  clotrimazole 1% Cream 1 Application(s) Topical every 12 hours  heparin   Injectable 5000 Unit(s) SubCutaneous every 12 hours  midodrine 15 milliGRAM(s) Oral every 8 hours  nystatin Powder 1 Application(s) Topical three times a day

## 2024-09-02 NOTE — SWALLOW BEDSIDE ASSESSMENT ADULT - SWALLOW EVAL: DIAGNOSIS
po trials not administered as pt is unarousable and contracted. pt does not open eyes nor mouth to spoon presentation

## 2024-09-02 NOTE — PROGRESS NOTE ADULT - DOES THE PATIENT HAVE A COURT APPOINTED GUARDIAN ([address])
Pt called back to order refills.   [employer]  prescriptions address has been verified.     6 month of on time refill.    Follow-up Date: 08/28/18    Brunilda Norton [facility]  [phone]     No

## 2024-09-02 NOTE — SWALLOW BEDSIDE ASSESSMENT ADULT - SLP PERTINENT HISTORY OF CURRENT PROBLEM
74F with severe dementa bedbound aox0 presents by ambulance for increased work of breathing, cough, and decreased responsiveness at home. Patient lives with son and daughter in law who noticed her symptoms starting in the last 1-2 days. Daughter in law notes that patient appeared to have blue feet this morning, prompting her to call 911. Patient typically eat puree at home (spoon fed) and often coughs or chokes if fed too quickly or improperly positioned.
74F with severe dementia bedbound aox0 presents by ambulance for increased work of breathing, cough, and decreased responsiveness at home. Patient lives with son and daughter in law who noticed her symptoms starting in the last 1-2 days. Daughter in law notes that patient appeared to have blue feet this morning, prompting her to call 911. Patient typically eat puree at home (spoon fed) and often coughs or chokes if fed too quickly or improperly positioned.
74F with severe dementa bedbound aox0 presents by ambulance for increased work of breathing, cough, and decreased responsiveness at home. Patient lives with son and daughter in law who noticed her symptoms starting in the last 1-2 days. Daughter in law notes that patient appeared to have blue feet this morning, prompting her to call 911. Patient typically eat puree at home (spoon fed) and often coughs or chokes if fed too quickly or improperly positioned.
a 74 F with severe dementia bedbound aox 0 presents by ambulance for increased work of breathing, cough, and decreased responsiveness at home and found with COVID and pneumonia. - completed 5 days RDV and  10 days of decadron. pt is reportedly cared for at home by son and is fed orally.
74F with severe dementia bedbound aox0 presents by ambulance for increased work of breathing, cough, and decreased responsiveness at home. Patient lives with son and daughter in law who noticed her symptoms starting in the last 1-2 days. Daughter in law notes that patient appeared to have blue feet this morning, prompting her to call 911. Patient typically eat puree at home (spoon fed) and often coughs or chokes if fed too quickly or improperly positioned.
74F with severe dementia bedbound aox0 presents by ambulance for increased work of breathing, cough, and decreased responsiveness at home. Patient lives with son and daughter in law who noticed her symptoms starting in the last 1-2 days. Daughter in law notes that patient appeared to have blue feet this morning, prompting her to call 911. Patient typically eat puree at home (spoon fed) and often coughs or chokes if fed too quickly or improperly positioned.

## 2024-09-02 NOTE — CHART NOTE - NSCHARTNOTEFT_GEN_A_CORE
Registered Dietitian Follow-Up     Patient Profile Reviewed                           Yes [X]   No []     Nutrition History Previously Obtained        Yes []  No []       Pertinent  Information:  pt is 74 year old female with hx of severe dementia, bedbound A+O x O p/w cough and decreased responsiveness at home. pt admitted with septic shock 2/2 possible aspiration PNA, severe DEIRDRE, elevated LFTs, hyponatremia. 8/15 s/p RR 2/2 hypotension, upgraded to CCU COVID +/sepsis.  s/p NGT placement tolerating EN.  pt presently downgraded to med surg unit, NGT was removed today for SLP re-evaluation, pt continues to be unarousable and not a candidate for po trials.  As per MD note family wishes pt to go home with hospice services.  NGT has not been reinserted at this time    Diet, NPO with Tube Feed:   Tube Feeding Modality: Nasogastric  Glucerna 1.2 Bob (GLUCERNARTH)  Total Volume for 24 Hours (mL): 960  Bolus  Total Volume of Bolus (mL):  240  Total # of Feeds: 4  Tube Feed Frequency: Every 6 hours   Tube Feed Start Time: 12:00  Bolus Feed Rate (mL per Hour): 120   Bolus Feed Duration (in Hours): 2  Free Water Flush  Bolus   Total Volume per Flush (mL): 200   Frequency: Every 6 Hours  Free Water Flush Instructions:  50 ml pre and post feed (08-26-24 @ 10:57) [Active]      MEDICATIONS  (STANDING):  acetylcysteine 20%  Inhalation 4 milliLiter(s) Inhalation every 6 hours  albuterol/ipratropium for Nebulization 3 milliLiter(s) Nebulizer every 6 hours  chlorhexidine 2% Cloths 1 Application(s) Topical <User Schedule>  clotrimazole 1% Cream 1 Application(s) Topical every 12 hours  heparin   Injectable 5000 Unit(s) SubCutaneous every 12 hours  midodrine 5 milliGRAM(s) Oral every 8 hours  nystatin Powder 1 Application(s) Topical three times a day    MEDICATIONS  (PRN):  acetaminophen     Tablet .. 650 milliGRAM(s) Oral every 6 hours PRN Temp greater or equal to 38C (100.4F), Mild Pain (1 - 3)    Pertinent Labs: 09-02 @ 06:29: Na 139, BUN 25<H>, Cr 0.6<L>, <H>, K+ 4.7, Phos --, Mg 2.3, Alk Phos 86, ALT/SGPT 29, AST/SGOT 20, HbA1c --    Finger Sticks:    Physical Findings:  - Appearance: lethargic   - GI function: +BS   - Tubes: n/a   - Oral/Mouth cavity:  - Skin: as per wound care note unstagable to R buttock noted   - Edema: generalized     Nutrition Requirements:  Weight Used: 36.3 kgs     Estimated Energy Needs   9381-1329 kcals (30-35 kcal/kg/BW)  43.5-51g protein (1.2-1.4g/kg/BW)  1;1 kcal for estimated fluid needs     Nutrient Intake: presently NPO no EN access     [] Previous Nutrition Diagnosis:            [] Ongoing          [] Resolved     new dx of inadequate pro-energy intake     Nutrition Education: n/a      Goal/Expected Outcome: CMO?     Indicator/Monitoring: GOC    Recommendation: maintain NPO pending GOC. IF NGT is to be reinserted resume previous feeds.  RD to f/u within 3-5 days   high risk

## 2024-09-02 NOTE — SWALLOW BEDSIDE ASSESSMENT ADULT - SPECIFY REASON(S)
Dysphagia re-evaluation
dysphagia evaluation
Dysphagia re-evaluation
swallow evaluation
dysphagia evaluation
dysphagia evaluation

## 2024-09-02 NOTE — SWALLOW BEDSIDE ASSESSMENT ADULT - NS ASR SWALLOW FINDINGS DISCUS
RN/Physician/Nursing
Nursing
Physician/Nursing/Patient
spoke with team during rounds/Physician/Nursing/Patient
Physician/Nursing
TATIANA Barney/Physician/Nursing

## 2024-09-02 NOTE — SWALLOW BEDSIDE ASSESSMENT ADULT - SLP GENERAL OBSERVATIONS
pt minimally arousable- does retract to pain. pt contracted in bed does not open eyes. No apparent pain or discomfort. pt on o2 via NC.
Pt found laying in bed on 4L O2 via NC. Pt w/ decreased arousability. Pt mumbled noise and fell back asleep. SLP provided tactile and verbal stimulation.
On 4L O2 nasal cannula

## 2024-09-02 NOTE — SWALLOW BEDSIDE ASSESSMENT ADULT - DIET PRIOR TO ADMI
Puree/thins w/ coughing & choking reported
puree with thins
Puree/thins w/ coughing & choking reported
Puree/thins w/ coughing & choking reported

## 2024-09-02 NOTE — PROGRESS NOTE ADULT - SUBJECTIVE AND OBJECTIVE BOX
PRISCILLA HERNANDEZ 74y Female  MRN#: [id number]   Hospital Day: 17d    SUBJECTIVE  Patient is a 74y old Female who presents with a chief complaint of septic shock (30 Aug 2024 10:38)  Currently admitted to medicine with the primary diagnosis of Sepsis    INTERVAL HPI AND OVERNIGHT EVENTS:  Patient was examined and seen at bedside. This morning she is resting comfortably in bed and reports no issues or overnight events.  nonverbal, contracted, bedbound ,  NGT and rectal tube.  hi flow titrated to NC,   NGT removed today for SLP    discussed with son GOJANUARY today, wants home hospice with [facility]    REVIEW OF SYMPTOMS:  limited given mental status     OBJECTIVE  PAST MEDICAL & SURGICAL HISTORY  Dementia      ALLERGIES:  ciprofloxacin (Unknown)  sulfa drugs (Unknown)  erythromycin (Unknown)  penicillin (Unknown)  penicillins (Unknown)    MEDICATIONS:  STANDING MEDICATIONS  acetylcysteine 20%  Inhalation 4 milliLiter(s) Inhalation every 6 hours  albuterol/ipratropium for Nebulization 3 milliLiter(s) Nebulizer every 6 hours  chlorhexidine 2% Cloths 1 Application(s) Topical <User Schedule>  clotrimazole 1% Cream 1 Application(s) Topical every 12 hours  heparin   Injectable 5000 Unit(s) SubCutaneous every 12 hours  midodrine 5 milliGRAM(s) Oral every 8 hours  nystatin Powder 1 Application(s) Topical three times a day    PRN MEDICATIONS    VITAL SIGNS: Last 24 Hours  Vital Signs Last 24 Hrs  T(C): 37.7 (02 Sep 2024 05:38), Max: 38.4 (02 Sep 2024 03:00)  T(F): 99.8 (02 Sep 2024 05:38), Max: 101.2 (02 Sep 2024 03:00)  HR: 113 (02 Sep 2024 05:38) (113 - 137)  BP: 93/60 (02 Sep 2024 05:38) (93/60 - 107/75)  BP(mean): --  RR: 18 (02 Sep 2024 05:38) (18 - 18)  SpO2: 100% (02 Sep 2024 09:37) (100% - 100%)    Parameters below as of 02 Sep 2024 09:37  Patient On (Oxygen Delivery Method): nasal cannula  O2 Flow (L/min): 2    PHYSICAL EXAM:  GENERAL: NAD,   HEENT : NC/AT, NGT  CHEST/LUNG: Clear to auscultation bilaterally;   HEART: Regular rate and rhythm;   ABDOMEN: Soft, Nontender, Nondistended; Bowel sounds present  EXTREMITIES:  no edema  NEURO:  nonverbal, contracted, bedbound   SKIN: No rashes or lesions  rectal tube - brown     LABS:                                        8.6    6.42  )-----------( 251      ( 02 Sep 2024 06:29 )             27.0     09-02    139  |  99  |  25<H>  ----------------------------<  145<H>  4.7   |  27  |  0.6<L>    Ca    8.3<L>      02 Sep 2024 06:29  Mg     2.3     09-02    TPro  5.1<L>  /  Alb  2.6<L>  /  TBili  0.5  /  DBili  x   /  AST  20  /  ALT  29  /  AlkPhos  86  09-02      Urinalysis Basic - ( 02 Sep 2024 06:29 )    Color: x / Appearance: x / SG: x / pH: x  Gluc: 145 mg/dL / Ketone: x  / Bili: x / Urobili: x   Blood: x / Protein: x / Nitrite: x   Leuk Esterase: x / RBC: x / WBC x   Sq Epi: x / Non Sq Epi: x / Bacteria: x      RADIOLOGY:  < from: Xray Chest 1 View- PORTABLE-Routine (Xray Chest 1 View- PORTABLE-Routine in AM.) (08.29.24 @ 06:53) >    Impression:    Increased bilateral opacities/effusions compared to prior.      < end of copied text >    MEDICATIONS  (STANDING):  acetylcysteine 20%  Inhalation 4 milliLiter(s) Inhalation every 6 hours  albuterol/ipratropium for Nebulization 3 milliLiter(s) Nebulizer every 6 hours  chlorhexidine 2% Cloths 1 Application(s) Topical <User Schedule>  clotrimazole 1% Cream 1 Application(s) Topical every 12 hours  heparin   Injectable 5000 Unit(s) SubCutaneous every 12 hours  midodrine 15 milliGRAM(s) Oral every 8 hours  nystatin Powder 1 Application(s) Topical three times a day

## 2024-09-02 NOTE — SWALLOW BEDSIDE ASSESSMENT ADULT - SWALLOW EVAL: RECOMMENDED DIET
NPO
Continue NPO with NGT
NPO
NPO. Consider NGT
Continue NPO with NGT
NPO with alternate means of nutrition/hydration

## 2024-09-02 NOTE — PROGRESS NOTE ADULT - CONVERSATION DETAILS
as per pall care :  patient's son over the phone and he noted that he wouldn't want at PEG, but wants to take the patient home and trialing oral feeding at home.   - discussed with son C today 9/2/24, wants home hospice with [facility]. NGT removed today, patient off hiflow, pending hospice evaluation.
Discussed with patient's son regarding GOC. Understand patient has severe dementia, but states she has been stable for some time at this level, and feels aspiration was due to conditions around her feeding while inpatient, and would want a trial of NGT here, but no PEG if needed. Discussed intubation as well, and that she may have difficulty weaning off ventilator if intubation were needed; he expressed understanding, and is coming to the hospital shortly, and expressed interested in speaking further with the ICU team.
Spoke with Gerardo over the phone. Palliative care introduced again. Patient's son gave a medical update and hospital course. He restated his previous goals for the patient. He and his family wish for NGT but likely not PEG. If the patient is unable to be weaned from NGT, he and his family would likely pursue comfort care. They wish to wait to see how the patient's clinical course progresses. All questions answered.

## 2024-09-03 NOTE — CHART NOTE - NSCHARTNOTEFT_GEN_A_CORE
Called to see patient who is congested, tachypneic and had period of hypoxia though with reposition pulse ox increased to high 90's. Called to see patient who is congested, tachypneic and had period of hypoxia though with reposition pulse ox increased to high 90's and after deep suctioning, appeared more comfortable

## 2024-09-03 NOTE — CHART NOTE - NSCHARTNOTESELECT_GEN_ALL_CORE
Event Note
Drop In Hgb/Event Note
Event Note
Nutrition Services
Nutrition Services
PallCare/Event Note
Palliative Care - Social Work/Event Note

## 2024-09-03 NOTE — CHART NOTE - NSCHARTNOTEFT_GEN_A_CORE
visited patient earlier today. patient encountered resting in bed - sleeping - no non verbal signs of pain or discomfort. no family at bedside at time of visit. [phone]

## 2024-09-03 NOTE — PROGRESS NOTE ADULT - SUBJECTIVE AND OBJECTIVE BOX
PRISCILLA HERNANDEZ 74y Female  MRN#: [id number]   Hospital Day: 17d    SUBJECTIVE  Patient is a 74y old Female who presents with a chief complaint of septic shock (30 Aug 2024 10:38)  Currently admitted to medicine with the primary diagnosis of Sepsis    INTERVAL HPI AND OVERNIGHT EVENTS:  Patient was examined and seen at bedside. This morning she is resting comfortably in bed and reports no issues or overnight events.  nonverbal, contracted, bedbound ,  NGT and rectal tube.    discussed with son CAYDEN today, wants home hospice with [facility]    REVIEW OF SYMPTOMS:  limited given mental status     OBJECTIVE  PAST MEDICAL & SURGICAL HISTORY  Dementia      ALLERGIES:  ciprofloxacin (Unknown)  sulfa drugs (Unknown)  erythromycin (Unknown)  penicillin (Unknown)  penicillins (Unknown)    MEDICATIONS:  STANDING MEDICATIONS  acetylcysteine 20%  Inhalation 4 milliLiter(s) Inhalation every 6 hours  albuterol/ipratropium for Nebulization 3 milliLiter(s) Nebulizer every 6 hours  chlorhexidine 2% Cloths 1 Application(s) Topical <User Schedule>  clotrimazole 1% Cream 1 Application(s) Topical every 12 hours  heparin   Injectable 5000 Unit(s) SubCutaneous every 12 hours  midodrine 5 milliGRAM(s) Oral every 8 hours  nystatin Powder 1 Application(s) Topical three times a day    PRN MEDICATIONS    VITAL SIGNS: Last 24 Hours  Vital Signs Last 24 Hrs  T(C): 36.3 (03 Sep 2024 13:34), Max: 38.2 (02 Sep 2024 22:43)  T(F): 97.4 (03 Sep 2024 13:34), Max: 100.7 (02 Sep 2024 22:43)  HR: 100 (03 Sep 2024 13:34) (96 - 112)  BP: 118/69 (03 Sep 2024 13:34) (103/67 - 155/45)  BP(mean): --  RR: 181 (03 Sep 2024 13:34) (18 - 181)  SpO2: 100% (03 Sep 2024 13:34) (95% - 100%)    Parameters below as of 03 Sep 2024 08:34  Patient On (Oxygen Delivery Method): nasal cannula  O2 Flow (L/min): 2    PHYSICAL EXAM:  GENERAL: NAD,   HEENT : NC/AT,  CHEST/LUNG: Clear to auscultation bilaterally;   HEART: Regular rate and rhythm;   ABDOMEN: Soft, Nontender, Nondistended; Bowel sounds present  EXTREMITIES:  no edema  NEURO:  nonverbal, contracted, bedbound   SKIN: No rashes or lesions  rectal tube - brown     LABS:                        8.6    6.42  )-----------( 251      ( 02 Sep 2024 06:29 )             27.0     09-02    139  |  99  |  25<H>  ----------------------------<  145<H>  4.7   |  27  |  0.6<L>    Ca    8.3<L>      02 Sep 2024 06:29  Mg     2.3     09-02    TPro  5.1<L>  /  Alb  2.6<L>  /  TBili  0.5  /  DBili  x   /  AST  20  /  ALT  29  /  AlkPhos  86  09-02      Urinalysis Basic - ( 02 Sep 2024 06:29 )    Color: x / Appearance: x / SG: x / pH: x  Gluc: 145 mg/dL / Ketone: x  / Bili: x / Urobili: x   Blood: x / Protein: x / Nitrite: x   Leuk Esterase: x / RBC: x / WBC x   Sq Epi: x / Non Sq Epi: x / Bacteria: x                                  RADIOLOGY:  < from: Xray Chest 1 View- PORTABLE-Routine (Xray Chest 1 View- PORTABLE-Routine in AM.) (08.29.24 @ 06:53) >    Impression:    Increased bilateral opacities/effusions compared to prior.      < end of copied text >    MEDICATIONS  (STANDING):  acetylcysteine 20%  Inhalation 4 milliLiter(s) Inhalation every 6 hours  albuterol/ipratropium for Nebulization 3 milliLiter(s) Nebulizer every 6 hours  chlorhexidine 2% Cloths 1 Application(s) Topical <User Schedule>  clotrimazole 1% Cream 1 Application(s) Topical every 12 hours  heparin   Injectable 5000 Unit(s) SubCutaneous every 12 hours  midodrine 15 milliGRAM(s) Oral every 8 hours  nystatin Powder 1 Application(s) Topical three times a day         PRISCILLA HERNANDEZ 74y Female  MRN#: [id number]   Hospital Day: 17d    SUBJECTIVE  Patient is a 74y old Female who presents with a chief complaint of septic shock (30 Aug 2024 10:38)  Currently admitted to medicine with the primary diagnosis of Sepsis    INTERVAL HPI AND OVERNIGHT EVENTS:  Patient was examined and seen at bedside. This morning she is resting comfortably in bed and reports no issues or overnight events.  nonverbal, contracted, bedbound ,  NGT and rectal tube.    discussed with son CAYDEN today, wants home hospice with [facility]    REVIEW OF SYMPTOMS:  limited given mental status     OBJECTIVE  PAST MEDICAL & SURGICAL HISTORY  Dementia      ALLERGIES:  ciprofloxacin (Unknown)  sulfa drugs (Unknown)  erythromycin (Unknown)  penicillin (Unknown)  penicillins (Unknown)    MEDICATIONS:  STANDING MEDICATIONS  acetylcysteine 20%  Inhalation 4 milliLiter(s) Inhalation every 6 hours  albuterol/ipratropium for Nebulization 3 milliLiter(s) Nebulizer every 6 hours  chlorhexidine 2% Cloths 1 Application(s) Topical <User Schedule>  clotrimazole 1% Cream 1 Application(s) Topical every 12 hours  heparin   Injectable 5000 Unit(s) SubCutaneous every 12 hours  midodrine 5 milliGRAM(s) Oral every 8 hours  nystatin Powder 1 Application(s) Topical three times a day    PRN MEDICATIONS    VITAL SIGNS: Last 24 Hours  Vital Signs Last 24 Hrs  T(C): 36.3 (03 Sep 2024 13:34), Max: 38.2 (02 Sep 2024 22:43)  T(F): 97.4 (03 Sep 2024 13:34), Max: 100.7 (02 Sep 2024 22:43)  HR: 100 (03 Sep 2024 13:34) (96 - 112)  BP: 118/69 (03 Sep 2024 13:34) (103/67 - 155/45)  BP(mean): --  RR: 181 (03 Sep 2024 13:34) (18 - 181)  SpO2: 100% (03 Sep 2024 13:34) (95% - 100%)    Parameters below as of 03 Sep 2024 08:34  Patient On (Oxygen Delivery Method): nasal cannula  O2 Flow (L/min): 2    PHYSICAL EXAM:  GENERAL: NAD,   HEENT : NC/AT,  CHEST/LUNG: Clear to auscultation bilaterally;   HEART: Regular rate and rhythm;   ABDOMEN: Soft, Nontender, Nondistended; Bowel sounds present  EXTREMITIES:  no edema  NEURO:  nonverbal, contracted, bedbound   SKIN: No rashes, Sacral ulcer  rectal tube - brown     LABS:                        8.6    6.42  )-----------( 251      ( 02 Sep 2024 06:29 )             27.0     09-02    139  |  99  |  25<H>  ----------------------------<  145<H>  4.7   |  27  |  0.6<L>    Ca    8.3<L>      02 Sep 2024 06:29  Mg     2.3     09-02    TPro  5.1<L>  /  Alb  2.6<L>  /  TBili  0.5  /  DBili  x   /  AST  20  /  ALT  29  /  AlkPhos  86  09-02      Urinalysis Basic - ( 02 Sep 2024 06:29 )    Color: x / Appearance: x / SG: x / pH: x  Gluc: 145 mg/dL / Ketone: x  / Bili: x / Urobili: x   Blood: x / Protein: x / Nitrite: x   Leuk Esterase: x / RBC: x / WBC x   Sq Epi: x / Non Sq Epi: x / Bacteria: x                                  RADIOLOGY:  < from: Xray Chest 1 View- PORTABLE-Routine (Xray Chest 1 View- PORTABLE-Routine in AM.) (08.29.24 @ 06:53) >    Impression:    Increased bilateral opacities/effusions compared to prior.      < end of copied text >    MEDICATIONS  (STANDING):  acetylcysteine 20%  Inhalation 4 milliLiter(s) Inhalation every 6 hours  albuterol/ipratropium for Nebulization 3 milliLiter(s) Nebulizer every 6 hours  chlorhexidine 2% Cloths 1 Application(s) Topical <User Schedule>  clotrimazole 1% Cream 1 Application(s) Topical every 12 hours  heparin   Injectable 5000 Unit(s) SubCutaneous every 12 hours  midodrine 15 milliGRAM(s) Oral every 8 hours  nystatin Powder 1 Application(s) Topical three times a day

## 2024-09-03 NOTE — PROGRESS NOTE ADULT - ASSESSMENT
acute respiratory failure 2/2 covid  Severe COVID  diarrhea  severe dementia bedbound  UE subQ hematoma  Sacral ulcer, no sign of infection  Intertrigo / Constipation  Immunosuppression    a 74 F with severe dementia bedbound aox 0 presents by ambulance for increased work of breathing, cough, and decreased responsiveness at home and found with COVID and pneumonia.   - cxr worse - maintain even fluid balance   - completed 5 days RDV and  10 days of decadron   - completed course of Cefepime as per ID  - titrate oxygen down as tolerated - maintain pox>90%   - wbc resolved   - hi flow titrated to NC,   - NGT removed today for SLP  - discussed with son [facility] today in details, also has had multiple encounters with prior providers and pall care, wants home hospice with [facility]  - f/u home hospice with [facility]    plan:  - CW referred for hospice   - Monitor off abx  - Continue topical clotrimazole  - decrease midodrine dose to 5mg   - discontinue Iv fluids as needed  - consulted ID, pall care Rec's appr.   - PT/rehab,   - Tylenol and pain management   - continue current medication, antihypertensive (with holding parameters),  - monitor vitals closely, daily am labs  - precaution (fall/aspiration/seizure)      DVT prophylaxis: SQH   GI prophylaxis: Protonix   diet: NGT feeds  code status: DNR/ intubate     #Progress Note Handoff  Pending (specify):  rectal tube for comfort given chronic ulcers, remove on discharge, home hospice with [facility]    Disposition: home with hospice if accepted      poor prognosis   as per pall care :  patient's son over the phone and he noted that he wouldn't want at PEG, but wants to take the patient home and trialing oral feeding at home.   - discussed with son [facility] today, wants home hospice with [facility]. NGT removed today, patient off hiflow, pending hospice evaluation

## 2024-09-03 NOTE — HOSPICE CARE NOTE - CONVESATION DETAILS
P/C placed to pt's son Gerardo who has a meeting with his mother's medical team later today. He is requesting a return P/C tomorrow.

## 2024-09-04 NOTE — HOSPICE CARE NOTE - CONVESATION DETAILS
Follow up phone call to patient's son Gerardo to discuss hospice. Gerardo requesting [facility] hospice, [facility].

## 2024-09-04 NOTE — PROGRESS NOTE ADULT - SUBJECTIVE AND OBJECTIVE BOX
PRISCILLA HERNANDEZ 74y Female  MRN#: [id number]   Hospital Day: 17d    SUBJECTIVE  Patient is a 74y old Female who presents with a chief complaint of septic shock (30 Aug 2024 10:38)  Currently admitted to medicine with the primary diagnosis of Sepsis    INTERVAL HPI AND OVERNIGHT EVENTS:  Patient was examined and seen at bedside. This morning she is resting comfortably in bed and reports no issues or overnight events.  nonverbal, contracted, bedbound ,  NGT and rectal tube.    discussed with son, provided medical update, plan for home hospice with [facility]    REVIEW OF SYMPTOMS:  limited given mental status     OBJECTIVE  PAST MEDICAL & SURGICAL HISTORY  Dementia      ALLERGIES:  ciprofloxacin (Unknown)  sulfa drugs (Unknown)  erythromycin (Unknown)  penicillin (Unknown)  penicillins (Unknown)    MEDICATIONS:  STANDING MEDICATIONS  acetylcysteine 20%  Inhalation 4 milliLiter(s) Inhalation every 6 hours  albuterol/ipratropium for Nebulization 3 milliLiter(s) Nebulizer every 6 hours  chlorhexidine 2% Cloths 1 Application(s) Topical <User Schedule>  clotrimazole 1% Cream 1 Application(s) Topical every 12 hours  heparin   Injectable 5000 Unit(s) SubCutaneous every 12 hours  midodrine 5 milliGRAM(s) Oral every 8 hours  nystatin Powder 1 Application(s) Topical three times a day    PRN MEDICATIONS    VITAL SIGNS: Last 24 Hours  Vital Signs Last 24 Hrs  T(C): 36.3 (03 Sep 2024 13:34), Max: 38.2 (02 Sep 2024 22:43)  T(F): 97.4 (03 Sep 2024 13:34), Max: 100.7 (02 Sep 2024 22:43)  HR: 100 (03 Sep 2024 13:34) (96 - 112)  BP: 118/69 (03 Sep 2024 13:34) (103/67 - 155/45)  BP(mean): --  RR: 181 (03 Sep 2024 13:34) (18 - 181)  SpO2: 100% (03 Sep 2024 13:34) (95% - 100%)    Parameters below as of 03 Sep 2024 08:34  Patient On (Oxygen Delivery Method): nasal cannula  O2 Flow (L/min): 2    PHYSICAL EXAM:  GENERAL: NAD,   HEENT : NC/AT,  CHEST/LUNG: Clear to auscultation bilaterally;   HEART: Regular rate and rhythm;   ABDOMEN: Soft, Nontender, Nondistended; Bowel sounds present  EXTREMITIES:  no edema  NEURO:  nonverbal, contracted, bedbound   SKIN: No rashes, Sacral ulcer  rectal tube - brown     LABS:                        8.6    6.42  )-----------( 251      ( 02 Sep 2024 06:29 )             27.0     09-02    139  |  99  |  25<H>  ----------------------------<  145<H>  4.7   |  27  |  0.6<L>    Ca    8.3<L>      02 Sep 2024 06:29  Mg     2.3     09-02    TPro  5.1<L>  /  Alb  2.6<L>  /  TBili  0.5  /  DBili  x   /  AST  20  /  ALT  29  /  AlkPhos  86  09-02      Urinalysis Basic - ( 02 Sep 2024 06:29 )    Color: x / Appearance: x / SG: x / pH: x  Gluc: 145 mg/dL / Ketone: x  / Bili: x / Urobili: x   Blood: x / Protein: x / Nitrite: x   Leuk Esterase: x / RBC: x / WBC x   Sq Epi: x / Non Sq Epi: x / Bacteria: x                                  RADIOLOGY:  < from: Xray Chest 1 View- PORTABLE-Routine (Xray Chest 1 View- PORTABLE-Routine in AM.) (08.29.24 @ 06:53) >    Impression:    Increased bilateral opacities/effusions compared to prior.      < end of copied text >    MEDICATIONS  (STANDING):  acetylcysteine 20%  Inhalation 4 milliLiter(s) Inhalation every 6 hours  albuterol/ipratropium for Nebulization 3 milliLiter(s) Nebulizer every 6 hours  chlorhexidine 2% Cloths 1 Application(s) Topical <User Schedule>  clotrimazole 1% Cream 1 Application(s) Topical every 12 hours  heparin   Injectable 5000 Unit(s) SubCutaneous every 12 hours  midodrine 15 milliGRAM(s) Oral every 8 hours  nystatin Powder 1 Application(s) Topical three times a day

## 2024-09-04 NOTE — PROGRESS NOTE ADULT - PROVIDER SPECIALTY LIST ADULT
Hospitalist
Infectious Disease
Infectious Disease
Pulmonology
Wound Care
Critical Care
Hospitalist
Infectious Disease
Infectious Disease
Nephrology
Pulmonology
Critical Care
Critical Care
Hospitalist
Hospitalist
Infectious Disease
Nephrology
Pulmonology
Critical Care
Critical Care
Hospitalist
Hospitalist
Infectious Disease
Infectious Disease
Internal Medicine
Nephrology
Palliative Care
Wound Care
Pulmonology
Infectious Disease
Infectious Disease

## 2024-09-04 NOTE — PROGRESS NOTE ADULT - REASON FOR ADMISSION
septic shock

## 2024-09-04 NOTE — PROGRESS NOTE ADULT - ASSESSMENT
acute respiratory failure 2/2 covid  Severe COVID  diarrhea  severe dementia bedbound  UE subQ hematoma  Sacral ulcer, no sign of infection  Intertrigo / Constipation  Immunosuppression    a 74 F with severe dementia bedbound aox 0 presents by ambulance for increased work of breathing, cough, and decreased responsiveness at home and found with COVID and pneumonia.   - cxr worse - maintain even fluid balance   - completed 5 days RDV and  10 days of decadron   - completed course of Cefepime as per ID  - titrate oxygen down as tolerated - maintain pox>90%   - wbc resolved   - hi flow titrated to NC,   - NGT removed today for SLP  - discussed with son [facility] today in details, also has had multiple encounters with prior providers and [facility], wants home hospice with [facility]  - f/u home hospice with [facility]    plan:  - [facility] referred for hospice   - Monitor off abx  - Continue topical clotrimazole  - decrease midodrine dose to 5mg   - discontinue Iv fluids as needed  - consulted ID, pall care Rec's appr.   - PT/rehab,   - Tylenol and pain management   - continue current medication, antihypertensive (with holding parameters),  - monitor vitals closely, daily am labs  - precaution (fall/aspiration/seizure)      DVT prophylaxis: SQH   GI prophylaxis: Protonix   diet: NGT feeds  code status: DNR/ intubate     #Progress Note Handoff  Pending (specify):  rectal tube for comfort given chronic ulcers, remove on discharge, home hospice with [facility]    Disposition: home with hospice if accepted      poor prognosis   - discussed with david, plan for home hospice with [facility]. NGT removed yesterday, patient off hiflow

## 2024-09-05 NOTE — DISCHARGE NOTE FOR THE EXPIRED PATIENT - NS DECEASED NEXTOFKIN_PHONE
[phone] Skin Substitute Injection Text: The defect edges were debeveled with a #15 scalpel blade.  Given the location of the defect, shape of the defect and the proximity to free margins a skin substitute micronized graft was deemed most appropriate.  The entire vial contents were admixed with 3.0ccs of sterile saline and then injected subcutaneously throughout the entire wound bed.

## 2024-09-05 NOTE — DISCHARGE NOTE FOR THE EXPIRED PATIENT - HOSPITAL COURSE
74 F with severe dementia bedbound aox 0 presents by ambulance for increased work of breathing, cough, and decreased responsiveness at home and found with COVID and pneumonia.   - completed 5 days RDV and  10 days of decadron   - completed course of Cefepime as per ID  - titrate oxygen down as tolerated - maintain pox>90%   - wbc resolved   - hi flow titrated to NC,   - NGT removed for SLP  - discussed with son CAYDEN in details, also has had multiple encounters with prior providers and pall care, desire was for home hospice with [facility]    plan:  - Monitored off abx  - decreased midodrine dose to 5mg   - discontinued Iv fluids as needed  - consulted ID, pall care Rec's appr.       Called to bedside for apnea and pulselessness, TOD pronounced 9/5/24 9:57am   [facility] [place] contacted [phone] [place]

## 2024-09-09 DIAGNOSIS — R00.1 BRADYCARDIA, UNSPECIFIED: ICD-10-CM

## 2024-09-09 DIAGNOSIS — R65.21 SEVERE SEPSIS WITH SEPTIC SHOCK: ICD-10-CM

## 2024-09-09 DIAGNOSIS — L89.310 PRESSURE ULCER OF RIGHT BUTTOCK, UNSTAGEABLE: ICD-10-CM

## 2024-09-09 DIAGNOSIS — R13.10 DYSPHAGIA, UNSPECIFIED: ICD-10-CM

## 2024-09-09 DIAGNOSIS — Z74.01 BED CONFINEMENT STATUS: ICD-10-CM

## 2024-09-09 DIAGNOSIS — J69.0 PNEUMONITIS DUE TO INHALATION OF FOOD AND VOMIT: ICD-10-CM

## 2024-09-09 DIAGNOSIS — U07.1 COVID-19: ICD-10-CM

## 2024-09-09 DIAGNOSIS — D84.81 IMMUNODEFICIENCY DUE TO CONDITIONS CLASSIFIED ELSEWHERE: ICD-10-CM

## 2024-09-09 DIAGNOSIS — R19.7 DIARRHEA, UNSPECIFIED: ICD-10-CM

## 2024-09-09 DIAGNOSIS — E87.1 HYPO-OSMOLALITY AND HYPONATREMIA: ICD-10-CM

## 2024-09-09 DIAGNOSIS — L89.152 PRESSURE ULCER OF SACRAL REGION, STAGE 2: ICD-10-CM

## 2024-09-09 DIAGNOSIS — J12.82 PNEUMONIA DUE TO CORONAVIRUS DISEASE 2019: ICD-10-CM

## 2024-09-09 DIAGNOSIS — E87.0 HYPEROSMOLALITY AND HYPERNATREMIA: ICD-10-CM

## 2024-09-09 DIAGNOSIS — E87.5 HYPERKALEMIA: ICD-10-CM

## 2024-09-09 DIAGNOSIS — R74.01 ELEVATION OF LEVELS OF LIVER TRANSAMINASE LEVELS: ICD-10-CM

## 2024-09-09 DIAGNOSIS — Z66 DO NOT RESUSCITATE: ICD-10-CM

## 2024-09-09 DIAGNOSIS — L89.626 PRESSURE-INDUCED DEEP TISSUE DAMAGE OF LEFT HEEL: ICD-10-CM

## 2024-09-09 DIAGNOSIS — Z88.0 ALLERGY STATUS TO PENICILLIN: ICD-10-CM
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
## 2024-09-09 DIAGNOSIS — K59.00 CONSTIPATION, UNSPECIFIED: ICD-10-CM

## 2024-09-09 DIAGNOSIS — J98.11 ATELECTASIS: ICD-10-CM

## 2024-09-09 DIAGNOSIS — E86.1 HYPOVOLEMIA: ICD-10-CM

## 2024-09-09 DIAGNOSIS — R71.0 PRECIPITOUS DROP IN HEMATOCRIT: ICD-10-CM

## 2024-09-09 DIAGNOSIS — F03.90 UNSPECIFIED DEMENTIA, UNSPECIFIED SEVERITY, WITHOUT BEHAVIORAL DISTURBANCE, PSYCHOTIC DISTURBANCE, MOOD DISTURBANCE, AND ANXIETY: ICD-10-CM

## 2024-09-09 DIAGNOSIS — S50.11XA CONTUSION OF RIGHT FOREARM, INITIAL ENCOUNTER: ICD-10-CM

## 2024-09-09 DIAGNOSIS — J96.01 ACUTE RESPIRATORY FAILURE WITH HYPOXIA: ICD-10-CM

## 2024-09-09 DIAGNOSIS — Z88.2 ALLERGY STATUS TO SULFONAMIDES: ICD-10-CM
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
## 2024-09-09 DIAGNOSIS — E86.0 DEHYDRATION: ICD-10-CM

## 2024-09-09 DIAGNOSIS — A41.9 SEPSIS, UNSPECIFIED ORGANISM: ICD-10-CM

## 2024-09-09 DIAGNOSIS — J90 PLEURAL EFFUSION, NOT ELSEWHERE CLASSIFIED: ICD-10-CM

## 2024-09-09 DIAGNOSIS — E87.20 ACIDOSIS, UNSPECIFIED: ICD-10-CM

## 2024-09-09 DIAGNOSIS — N17.9 ACUTE KIDNEY FAILURE, UNSPECIFIED: ICD-10-CM

## 2024-09-09 DIAGNOSIS — Z88.1 ALLERGY STATUS TO OTHER ANTIBIOTIC AGENTS: ICD-10-CM
